# Patient Record
Sex: MALE | Race: BLACK OR AFRICAN AMERICAN | NOT HISPANIC OR LATINO | Employment: FULL TIME | URBAN - METROPOLITAN AREA
[De-identification: names, ages, dates, MRNs, and addresses within clinical notes are randomized per-mention and may not be internally consistent; named-entity substitution may affect disease eponyms.]

---

## 2020-10-21 ENCOUNTER — OFFICE VISIT (OUTPATIENT)
Dept: CARDIOLOGY | Facility: CLINIC | Age: 55
End: 2020-10-21
Payer: MEDICARE

## 2020-10-21 VITALS
DIASTOLIC BLOOD PRESSURE: 92 MMHG | HEART RATE: 91 BPM | SYSTOLIC BLOOD PRESSURE: 130 MMHG | WEIGHT: 315 LBS | OXYGEN SATURATION: 96 %

## 2020-10-21 DIAGNOSIS — I10 ESSENTIAL (PRIMARY) HYPERTENSION: ICD-10-CM

## 2020-10-21 DIAGNOSIS — G45.9 TIA (TRANSIENT ISCHEMIC ATTACK): Primary | ICD-10-CM

## 2020-10-21 DIAGNOSIS — I42.8 NONISCHEMIC CARDIOMYOPATHY: ICD-10-CM

## 2020-10-21 DIAGNOSIS — Z95.810 AUTOMATIC IMPLANTABLE CARDIOVERTER-DEFIBRILLATOR IN SITU: ICD-10-CM

## 2020-10-21 DIAGNOSIS — I50.42 CHRONIC COMBINED SYSTOLIC AND DIASTOLIC HEART FAILURE: ICD-10-CM

## 2020-10-21 PROBLEM — E66.01 MORBID OBESITY WITH BMI OF 50.0-59.9, ADULT: Status: ACTIVE | Noted: 2019-11-21

## 2020-10-21 PROBLEM — E78.2 MIXED HYPERLIPIDEMIA: Status: ACTIVE | Noted: 2020-03-29

## 2020-10-21 PROBLEM — I47.20 VT (VENTRICULAR TACHYCARDIA): Status: ACTIVE | Noted: 2018-06-20

## 2020-10-21 PROCEDURE — 93010 EKG 12-LEAD: ICD-10-PCS | Mod: ,,, | Performed by: INTERNAL MEDICINE

## 2020-10-21 PROCEDURE — 99999 PR PBB SHADOW E&M-NEW PATIENT-LVL III: ICD-10-PCS | Mod: PBBFAC,,, | Performed by: INTERNAL MEDICINE

## 2020-10-21 PROCEDURE — 99214 PR OFFICE/OUTPT VISIT, EST, LEVL IV, 30-39 MIN: ICD-10-PCS | Mod: S$PBB,,, | Performed by: INTERNAL MEDICINE

## 2020-10-21 PROCEDURE — 99214 OFFICE O/P EST MOD 30 MIN: CPT | Mod: S$PBB,,, | Performed by: INTERNAL MEDICINE

## 2020-10-21 PROCEDURE — 93005 ELECTROCARDIOGRAM TRACING: CPT

## 2020-10-21 PROCEDURE — 93010 ELECTROCARDIOGRAM REPORT: CPT | Mod: ,,, | Performed by: INTERNAL MEDICINE

## 2020-10-21 PROCEDURE — 99203 OFFICE O/P NEW LOW 30 MIN: CPT | Mod: PBBFAC | Performed by: INTERNAL MEDICINE

## 2020-10-21 PROCEDURE — 99999 PR PBB SHADOW E&M-NEW PATIENT-LVL III: CPT | Mod: PBBFAC,,, | Performed by: INTERNAL MEDICINE

## 2020-10-21 RX ORDER — PANTOPRAZOLE SODIUM 40 MG/1
40 TABLET, DELAYED RELEASE ORAL DAILY
COMMUNITY
Start: 2020-09-21 | End: 2020-12-10 | Stop reason: SDUPTHER

## 2020-10-21 RX ORDER — SPIRONOLACTONE 25 MG/1
25 TABLET ORAL DAILY
COMMUNITY
Start: 2020-08-31 | End: 2021-03-29 | Stop reason: SDUPTHER

## 2020-10-21 RX ORDER — DIGOXIN 250 MCG
250 TABLET ORAL DAILY
COMMUNITY
Start: 2020-01-23 | End: 2021-03-29 | Stop reason: SDUPTHER

## 2020-10-21 RX ORDER — CARVEDILOL 25 MG/1
25 TABLET ORAL 2 TIMES DAILY
COMMUNITY
Start: 2020-01-23 | End: 2022-01-19 | Stop reason: SDUPTHER

## 2020-10-21 RX ORDER — LOSARTAN POTASSIUM 50 MG/1
50 TABLET ORAL DAILY
COMMUNITY
Start: 2020-09-21 | End: 2020-11-17 | Stop reason: ALTCHOICE

## 2020-10-21 RX ORDER — ATORVASTATIN CALCIUM 40 MG/1
40 TABLET, FILM COATED ORAL DAILY
COMMUNITY
Start: 2020-05-27 | End: 2020-10-26 | Stop reason: SDUPTHER

## 2020-10-21 RX ORDER — ALLOPURINOL 100 MG/1
100 TABLET ORAL DAILY
COMMUNITY
Start: 2020-07-09 | End: 2021-07-09

## 2020-10-21 RX ORDER — LOSARTAN POTASSIUM 25 MG/1
50 TABLET ORAL DAILY
COMMUNITY
Start: 2020-08-31 | End: 2020-10-21

## 2020-10-21 RX ORDER — INDOMETHACIN 50 MG/1
50 CAPSULE ORAL
COMMUNITY
End: 2023-08-09

## 2020-10-21 NOTE — PROGRESS NOTES
Cardiology    10/21/2020  1:26 PM    Problem list  Patient Active Problem List   Diagnosis    Automatic implantable cardioverter-defibrillator in situ    Essential hypertension    Mixed hyperlipidemia    Morbid obesity with BMI of 50.0-59.9, adult    Nonischemic cardiomyopathy    VT (ventricular tachycardia)       CC:  NICMP    HPI:  Was last seen September by COREY Cowan/Dr. Adorno.  He wanted to f/u with us now.  As we were driving he episode where he describes losing vision for a few seconds.  Fortunately he was able to liver and his wife was able to drive.  He also reported dizziness which is not position.  He denies any angina.  He reports coughing which has improved since taking pantoprazole month in September.  He denies any chest pain or paroxysmal nocturnal dyspnea.  He was changed from candesartan to losartan 25 mg.  He was instructed to take 50 mg of losartan which she has not started yet.    Medications  Current Outpatient Medications   Medication Sig Dispense Refill    allopurinoL (ZYLOPRIM) 100 MG tablet Take 100 mg by mouth once daily.      aspirin (ECOTRIN) 81 MG EC tablet Take 81 mg by mouth. 1 Tablet, Delayed Release (E.C.) Oral Every day      atorvastatin (LIPITOR) 40 MG tablet Take 40 mg by mouth once daily.      carvediloL (COREG) 25 MG tablet Take 25 mg by mouth 2 (two) times a day.      digoxin (LANOXIN) 250 mcg tablet Take 250 mcg by mouth once daily.      furosemide (LASIX) 40 MG tablet Take 40 mg by mouth. 1 Tablet Oral Every day      indomethacin (INDOCIN) 50 MG capsule Take 50 mg by mouth as needed.      losartan (COZAAR) 50 MG tablet Take 50 mg by mouth once daily.      pantoprazole (PROTONIX) 40 MG tablet Take 40 mg by mouth once daily.      spironolactone (ALDACTONE) 25 MG tablet Take 25 mg by mouth once daily.       No current facility-administered medications for this visit.       Prior to Admission medications    Medication Sig Start Date End Date Taking?  Authorizing Provider   allopurinoL (ZYLOPRIM) 100 MG tablet Take 100 mg by mouth once daily. 7/9/20 7/9/21 Yes Historical Provider   aspirin (ECOTRIN) 81 MG EC tablet Take 81 mg by mouth. 1 Tablet, Delayed Release (E.C.) Oral Every day   Yes Historical Provider   atorvastatin (LIPITOR) 40 MG tablet Take 40 mg by mouth once daily. 5/27/20 5/27/21 Yes Historical Provider   carvediloL (COREG) 25 MG tablet Take 25 mg by mouth 2 (two) times a day. 1/23/20  Yes Historical Provider   digoxin (LANOXIN) 250 mcg tablet Take 250 mcg by mouth once daily. 1/23/20  Yes Historical Provider   furosemide (LASIX) 40 MG tablet Take 40 mg by mouth. 1 Tablet Oral Every day   Yes Historical Provider   indomethacin (INDOCIN) 50 MG capsule Take 50 mg by mouth as needed.   Yes Historical Provider   losartan (COZAAR) 50 MG tablet Take 50 mg by mouth once daily. 9/21/20 12/20/20 Yes Historical Provider   pantoprazole (PROTONIX) 40 MG tablet Take 40 mg by mouth once daily. 9/21/20  Yes Historical Provider   spironolactone (ALDACTONE) 25 MG tablet Take 25 mg by mouth once daily. 8/31/20  Yes Historical Provider   atorvastatin (LIPITOR) 10 MG tablet Take 10 mg by mouth. 1 Tablet Oral Every day  10/21/20  Historical Provider   candesartan (ATACAND) 16 MG tablet Take 16 mg by mouth. 1 Tablet Oral Every day  10/21/20  Historical Provider   carvedilol (COREG CR) 80 MG 24 hr capsule Take 80 mg by mouth. 1 Cap, Multiphasic Release 24 hr Oral Every day  10/21/20  Historical Provider   losartan (COZAAR) 25 MG tablet Take 50 mg by mouth once daily. 8/31/20 10/21/20  Historical Provider         History  No past medical history on file.  No past surgical history on file.  Social History     Socioeconomic History    Marital status:      Spouse name: Not on file    Number of children: Not on file    Years of education: Not on file    Highest education level: Not on file   Occupational History    Not on file   Social Needs    Financial resource  strain: Not on file    Food insecurity     Worry: Not on file     Inability: Not on file    Transportation needs     Medical: Not on file     Non-medical: Not on file   Tobacco Use    Smoking status: Former Smoker     Types: Cigars    Smokeless tobacco: Never Used   Substance and Sexual Activity    Alcohol use: Yes    Drug use: Not on file    Sexual activity: Not on file   Lifestyle    Physical activity     Days per week: Not on file     Minutes per session: Not on file    Stress: Not on file   Relationships    Social connections     Talks on phone: Not on file     Gets together: Not on file     Attends Adventism service: Not on file     Active member of club or organization: Not on file     Attends meetings of clubs or organizations: Not on file     Relationship status: Not on file   Other Topics Concern    Not on file   Social History Narrative    Not on file         Allergies  Review of patient's allergies indicates:  No Known Allergies      Review of Systems   Review of Systems   Constitution: Negative for decreased appetite, fever and weight loss.   HENT: Negative for congestion and nosebleeds.    Eyes: Positive for blurred vision. Negative for double vision, vision loss in left eye, vision loss in right eye and visual disturbance.   Cardiovascular: Negative for chest pain, claudication, cyanosis, dyspnea on exertion, irregular heartbeat, leg swelling, near-syncope, orthopnea, palpitations, paroxysmal nocturnal dyspnea and syncope.   Respiratory: Positive for cough. Negative for hemoptysis, shortness of breath, sleep disturbances due to breathing, snoring, sputum production and wheezing.    Endocrine: Negative for cold intolerance and heat intolerance.   Skin: Negative for nail changes and rash.   Musculoskeletal: Negative for joint pain, muscle cramps, muscle weakness and myalgias.   Gastrointestinal: Negative for change in bowel habit, heartburn, hematemesis, hematochezia, hemorrhoids and melena.    Neurological: Negative for dizziness, focal weakness and headaches.         Physical Exam  Wt Readings from Last 1 Encounters:   10/21/20 (!) 148.5 kg (327 lb 7.9 oz)     BP Readings from Last 3 Encounters:   10/21/20 (!) 130/92   07/30/12 116/61     Pulse Readings from Last 1 Encounters:   10/21/20 91     There is no height or weight on file to calculate BMI.    Physical Exam   Constitutional: He is oriented to person, place, and time. He appears well-developed and well-nourished.   Cardiovascular: Normal rate, regular rhythm, S1 normal, S2 normal and normal heart sounds.   Pulses:       Carotid pulses are 2+ on the right side and 2+ on the left side.       Radial pulses are 2+ on the right side and 2+ on the left side.        Dorsalis pedis pulses are 2+ on the right side and 2+ on the left side.   L side ICD.   Pulmonary/Chest: Breath sounds normal.   Abdominal: Bowel sounds are normal.   Neurological: He is alert and oriented to person, place, and time.   Skin: Skin is warm and dry.   Vitals reviewed.                Assessment  1. Nonischemic cardiomyopathy  Stable    2. Automatic implantable cardioverter-defibrillator in situ  Stable        Plan and Discussion  Increase losartan 50 mg daily and monitor blood pressure.  Will check his TSH since he is on amiodarone.  Will check his carotid Doppler given his vision loss.  He was encouraged to get an eye exam.  Will schedule ICD device check next week to evaluate for any arrhythmias.    Follow Up  One month    Time spent evaluating and treating patient 20 minutes with >50% of this time being face-to-face.     Senthil Rodríguez MD, F.A.C.C, F.S.C.A.I.

## 2020-10-26 ENCOUNTER — OFFICE VISIT (OUTPATIENT)
Dept: CARDIOLOGY | Facility: CLINIC | Age: 55
End: 2020-10-26
Payer: MEDICARE

## 2020-10-26 ENCOUNTER — HOSPITAL ENCOUNTER (OUTPATIENT)
Dept: CARDIOLOGY | Facility: OTHER | Age: 55
Discharge: HOME | End: 2020-10-26
Attending: INTERNAL MEDICINE
Payer: MEDICARE

## 2020-10-26 VITALS — SYSTOLIC BLOOD PRESSURE: 124 MMHG | DIASTOLIC BLOOD PRESSURE: 86 MMHG | HEART RATE: 88 BPM | OXYGEN SATURATION: 99 %

## 2020-10-26 DIAGNOSIS — Z95.810 AUTOMATIC IMPLANTABLE CARDIOVERTER-DEFIBRILLATOR IN SITU: Primary | ICD-10-CM

## 2020-10-26 DIAGNOSIS — I47.20 VT (VENTRICULAR TACHYCARDIA): Primary | ICD-10-CM

## 2020-10-26 DIAGNOSIS — E66.01 MORBID OBESITY WITH BMI OF 50.0-59.9, ADULT: ICD-10-CM

## 2020-10-26 DIAGNOSIS — I42.8 NONISCHEMIC CARDIOMYOPATHY: ICD-10-CM

## 2020-10-26 DIAGNOSIS — E78.2 MIXED HYPERLIPIDEMIA: ICD-10-CM

## 2020-10-26 DIAGNOSIS — Z95.810 AUTOMATIC IMPLANTABLE CARDIOVERTER-DEFIBRILLATOR IN SITU: ICD-10-CM

## 2020-10-26 DIAGNOSIS — I10 ESSENTIAL HYPERTENSION: ICD-10-CM

## 2020-10-26 DIAGNOSIS — G45.9 TIA (TRANSIENT ISCHEMIC ATTACK): ICD-10-CM

## 2020-10-26 LAB
LEFT CCA DIST DIAS: 22 CM/S
LEFT CCA DIST SYS: 79 CM/S
LEFT CCA PROX DIAS: 28 CM/S
LEFT CCA PROX SYS: 94 CM/S
LEFT ECA SYS: 60 CM/S
LEFT ICA DIST DIAS: 28 CM/S
LEFT ICA DIST SYS: 96 CM/S
LEFT ICA MID DIAS: 27 CM/S
LEFT ICA MID SYS: 56 CM/S
LEFT ICA PROX DIAS: 11 CM/S
LEFT ICA PROX SYS: 47 CM/S
LEFT VERTEBRAL DIAS: 14 CM/S
LEFT VERTEBRAL SYS: 45 CM/S
OHS CV CAROTID RIGHT ICA EDV HIGHEST: 26
OHS CV CAROTID ULTRASOUND LEFT ICA/CCA RATIO: 1.22
OHS CV CAROTID ULTRASOUND RIGHT ICA/CCA RATIO: 1
OHS CV PV CAROTID LEFT HIGHEST CCA: 94
OHS CV PV CAROTID LEFT HIGHEST ICA: 96
OHS CV PV CAROTID RIGHT HIGHEST CCA: 75
OHS CV PV CAROTID RIGHT HIGHEST ICA: 68
OHS CV US CAROTID LEFT HIGHEST EDV: 28
RIGHT CCA DIST DIAS: 20 CM/S
RIGHT CCA DIST SYS: 68 CM/S
RIGHT CCA PROX DIAS: 19 CM/S
RIGHT CCA PROX SYS: 75 CM/S
RIGHT ECA SYS: 64 CM/S
RIGHT ICA DIST DIAS: 26 CM/S
RIGHT ICA DIST SYS: 63 CM/S
RIGHT ICA MID DIAS: 20 CM/S
RIGHT ICA MID SYS: 48 CM/S
RIGHT ICA PROX DIAS: 22 CM/S
RIGHT ICA PROX SYS: 68 CM/S
RIGHT VERTEBRAL DIAS: 17 CM/S
RIGHT VERTEBRAL SYS: 42 CM/S

## 2020-10-26 PROCEDURE — 99211 OFF/OP EST MAY X REQ PHY/QHP: CPT | Mod: PBBFAC | Performed by: INTERNAL MEDICINE

## 2020-10-26 PROCEDURE — 99214 OFFICE O/P EST MOD 30 MIN: CPT | Mod: S$PBB,,, | Performed by: INTERNAL MEDICINE

## 2020-10-26 PROCEDURE — 93880 CV US DOPPLER CAROTID (CUPID ONLY): ICD-10-PCS | Mod: 26,,, | Performed by: INTERNAL MEDICINE

## 2020-10-26 PROCEDURE — 93880 EXTRACRANIAL BILAT STUDY: CPT | Mod: 26,,, | Performed by: INTERNAL MEDICINE

## 2020-10-26 PROCEDURE — 99024 POSTOP FOLLOW-UP VISIT: CPT | Mod: POP,,, | Performed by: INTERNAL MEDICINE

## 2020-10-26 PROCEDURE — 93880 EXTRACRANIAL BILAT STUDY: CPT

## 2020-10-26 PROCEDURE — 93283 PR PROGRAM EVAL (IN PERSON) IMPLANT DEVICE,CARDVERT/DEFIB,2 LEAD: ICD-10-PCS | Mod: 26,S$PBB,, | Performed by: INTERNAL MEDICINE

## 2020-10-26 PROCEDURE — 93283 PRGRMG EVAL IMPLANTABLE DFB: CPT | Mod: PBBFAC | Performed by: INTERNAL MEDICINE

## 2020-10-26 PROCEDURE — 99999 PR PBB SHADOW E&M-EST. PATIENT-LVL II: ICD-10-PCS | Mod: PBBFAC,,, | Performed by: INTERNAL MEDICINE

## 2020-10-26 PROCEDURE — 99024 PR POST-OP FOLLOW-UP VISIT: ICD-10-PCS | Mod: POP,,, | Performed by: INTERNAL MEDICINE

## 2020-10-26 PROCEDURE — 99999 PR PBB SHADOW E&M-EST. PATIENT-LVL I: CPT | Mod: PBBFAC,,, | Performed by: INTERNAL MEDICINE

## 2020-10-26 PROCEDURE — 93283 PRGRMG EVAL IMPLANTABLE DFB: CPT | Mod: 26,S$PBB,, | Performed by: INTERNAL MEDICINE

## 2020-10-26 PROCEDURE — 99214 PR OFFICE/OUTPT VISIT, EST, LEVL IV, 30-39 MIN: ICD-10-PCS | Mod: S$PBB,,, | Performed by: INTERNAL MEDICINE

## 2020-10-26 PROCEDURE — 99999 PR PBB SHADOW E&M-EST. PATIENT-LVL I: ICD-10-PCS | Mod: PBBFAC,,, | Performed by: INTERNAL MEDICINE

## 2020-10-26 PROCEDURE — 99212 OFFICE O/P EST SF 10 MIN: CPT | Mod: PBBFAC,25,27 | Performed by: INTERNAL MEDICINE

## 2020-10-26 PROCEDURE — 99999 PR PBB SHADOW E&M-EST. PATIENT-LVL II: CPT | Mod: PBBFAC,,, | Performed by: INTERNAL MEDICINE

## 2020-10-26 RX ORDER — ATORVASTATIN CALCIUM 40 MG/1
40 TABLET, FILM COATED ORAL DAILY
Qty: 90 TABLET | Refills: 3 | Status: SHIPPED | OUTPATIENT
Start: 2020-10-26 | End: 2022-01-19 | Stop reason: SDUPTHER

## 2020-10-26 RX ORDER — AMIODARONE HYDROCHLORIDE 200 MG/1
200 TABLET ORAL 2 TIMES DAILY
Qty: 90 TABLET | Refills: 3 | Status: SHIPPED | OUTPATIENT
Start: 2020-10-26 | End: 2021-06-10

## 2020-10-26 NOTE — PROGRESS NOTES
Cardiology    10/26/2020  3:49 PM    Problem list  Patient Active Problem List   Diagnosis    Automatic implantable cardioverter-defibrillator in situ    Essential hypertension    Mixed hyperlipidemia    Morbid obesity with BMI of 50.0-59.9, adult    Nonischemic cardiomyopathy    VT (ventricular tachycardia)       CC:  SVT, NSVT    HPI:  Asked to be seen today since his device interrogation revealed multiple episodes of SVT and nonsustained VT.  One of these episodes coincided with his symptoms decreased vision while driving the [place].  Remembers that he was given amiodarone the same issues but he was afraid to take it.    Medications  Current Outpatient Medications   Medication Sig Dispense Refill    allopurinoL (ZYLOPRIM) 100 MG tablet Take 100 mg by mouth once daily.      aspirin (ECOTRIN) 81 MG EC tablet Take 81 mg by mouth. 1 Tablet, Delayed Release (E.C.) Oral Every day      atorvastatin (LIPITOR) 40 MG tablet Take 40 mg by mouth once daily.      carvediloL (COREG) 25 MG tablet Take 25 mg by mouth 2 (two) times a day.      digoxin (LANOXIN) 250 mcg tablet Take 250 mcg by mouth once daily.      furosemide (LASIX) 40 MG tablet Take 40 mg by mouth. 1 Tablet Oral Every day      indomethacin (INDOCIN) 50 MG capsule Take 50 mg by mouth as needed.      losartan (COZAAR) 50 MG tablet Take 50 mg by mouth once daily.       pantoprazole (PROTONIX) 40 MG tablet Take 40 mg by mouth once daily.      spironolactone (ALDACTONE) 25 MG tablet Take 25 mg by mouth once daily.       No current facility-administered medications for this visit.       Prior to Admission medications    Medication Sig Start Date End Date Taking? Authorizing Provider   allopurinoL (ZYLOPRIM) 100 MG tablet Take 100 mg by mouth once daily. 7/9/20 7/9/21 Yes Historical Provider   aspirin (ECOTRIN) 81 MG EC tablet Take 81 mg by mouth. 1 Tablet, Delayed Release (E.C.) Oral Every day   Yes Historical Provider   atorvastatin (LIPITOR) 40  MG tablet Take 40 mg by mouth once daily. 5/27/20 5/27/21 Yes Historical Provider   carvediloL (COREG) 25 MG tablet Take 25 mg by mouth 2 (two) times a day. 1/23/20  Yes Historical Provider   digoxin (LANOXIN) 250 mcg tablet Take 250 mcg by mouth once daily. 1/23/20  Yes Historical Provider   furosemide (LASIX) 40 MG tablet Take 40 mg by mouth. 1 Tablet Oral Every day   Yes Historical Provider   indomethacin (INDOCIN) 50 MG capsule Take 50 mg by mouth as needed.   Yes Historical Provider   losartan (COZAAR) 50 MG tablet Take 50 mg by mouth once daily.  9/21/20 12/20/20 Yes Historical Provider   pantoprazole (PROTONIX) 40 MG tablet Take 40 mg by mouth once daily. 9/21/20  Yes Historical Provider   spironolactone (ALDACTONE) 25 MG tablet Take 25 mg by mouth once daily. 8/31/20  Yes Historical Provider         History  No past medical history on file.  No past surgical history on file.  Social History     Socioeconomic History    Marital status:      Spouse name: Not on file    Number of children: Not on file    Years of education: Not on file    Highest education level: Not on file   Occupational History    Not on file   Social Needs    Financial resource strain: Not on file    Food insecurity     Worry: Not on file     Inability: Not on file    Transportation needs     Medical: Not on file     Non-medical: Not on file   Tobacco Use    Smoking status: Former Smoker     Types: Cigars    Smokeless tobacco: Never Used   Substance and Sexual Activity    Alcohol use: Yes    Drug use: Not on file    Sexual activity: Not on file   Lifestyle    Physical activity     Days per week: Not on file     Minutes per session: Not on file    Stress: Not on file   Relationships    Social connections     Talks on phone: Not on file     Gets together: Not on file     Attends Tenriism service: Not on file     Active member of club or organization: Not on file     Attends meetings of clubs or organizations: Not on  file     Relationship status: Not on file   Other Topics Concern    Not on file   Social History Narrative    Not on file         Allergies  Review of patient's allergies indicates:  No Known Allergies      Review of Systems   Review of Systems   Constitution: Negative for decreased appetite, fever and weight loss.   HENT: Negative for congestion and nosebleeds.    Eyes: Positive for blurred vision. Negative for double vision, vision loss in left eye, vision loss in right eye and visual disturbance.   Cardiovascular: Negative for chest pain, claudication, cyanosis, dyspnea on exertion, irregular heartbeat, leg swelling, near-syncope, orthopnea, palpitations, paroxysmal nocturnal dyspnea and syncope.   Respiratory: Positive for cough. Negative for hemoptysis, shortness of breath, sleep disturbances due to breathing, snoring, sputum production and wheezing.    Endocrine: Negative for cold intolerance and heat intolerance.   Skin: Negative for nail changes and rash.   Musculoskeletal: Negative for joint pain, muscle cramps, muscle weakness and myalgias.   Gastrointestinal: Negative for change in bowel habit, heartburn, hematemesis, hematochezia, hemorrhoids and melena.   Neurological: Negative for dizziness, focal weakness and headaches.         Physical Exam  Wt Readings from Last 1 Encounters:   10/21/20 (!) 148.5 kg (327 lb 7.9 oz)     BP Readings from Last 3 Encounters:   10/26/20 124/86   10/21/20 (!) 130/92   07/30/12 116/61     Pulse Readings from Last 1 Encounters:   10/26/20 88     There is no height or weight on file to calculate BMI.    Physical Exam   Constitutional: He is oriented to person, place, and time. He appears well-developed and well-nourished.   Cardiovascular: Normal rate, regular rhythm, S1 normal, S2 normal and normal heart sounds.   Pulses:       Carotid pulses are 2+ on the right side and 2+ on the left side.       Radial pulses are 2+ on the right side and 2+ on the left side.         Dorsalis pedis pulses are 2+ on the right side and 2+ on the left side.   L side ICD.   Pulmonary/Chest: Breath sounds normal.   Abdominal: Bowel sounds are normal.   Neurological: He is alert and oriented to person, place, and time.   Skin: Skin is warm and dry.   Vitals reviewed.                Assessment  1. VT (ventricular tachycardia)  Being     2. Nonischemic cardiomyopathy  stable    3. Mixed hyperlipidemia  stable    4. Essential hypertension  stable    5. Automatic implantable cardioverter-defibrillator in situ  stable    6. Morbid obesity with BMI of 50.0-59.9, adult  unchanged        Plan and Discussion  Discussed his device interrogation which showed SVT and nonsustained VT.  Encouraged to comply with amiodarone loading of 200 mg twice daily for 3 weeks and then decrease to 200 mg daily for suppression.  He already had baseline labs and chest x-ray which were all fine in October.  He was recommended to get his annual eye exam.  Once fully loaded on amiodarone, will recheck his device.    Follow Up  11/17/20     Time spent evaluating and treating patient 20 minutes with >50% of this time being face-to-face.     Senthil Rodríguez MD, F.A.C.C, F.S.C.A.I.

## 2020-11-17 ENCOUNTER — OFFICE VISIT (OUTPATIENT)
Dept: CARDIOLOGY | Facility: CLINIC | Age: 55
End: 2020-11-17
Payer: MEDICARE

## 2020-11-17 VITALS
HEART RATE: 90 BPM | HEIGHT: 70 IN | BODY MASS INDEX: 45.1 KG/M2 | WEIGHT: 315 LBS | DIASTOLIC BLOOD PRESSURE: 78 MMHG | SYSTOLIC BLOOD PRESSURE: 136 MMHG

## 2020-11-17 DIAGNOSIS — I10 ESSENTIAL HYPERTENSION: ICD-10-CM

## 2020-11-17 DIAGNOSIS — I47.20 VT (VENTRICULAR TACHYCARDIA): Primary | ICD-10-CM

## 2020-11-17 DIAGNOSIS — Z95.810 AUTOMATIC IMPLANTABLE CARDIOVERTER-DEFIBRILLATOR IN SITU: ICD-10-CM

## 2020-11-17 DIAGNOSIS — E78.2 MIXED HYPERLIPIDEMIA: ICD-10-CM

## 2020-11-17 DIAGNOSIS — E66.01 MORBID OBESITY WITH BMI OF 50.0-59.9, ADULT: ICD-10-CM

## 2020-11-17 DIAGNOSIS — I42.8 NONISCHEMIC CARDIOMYOPATHY: ICD-10-CM

## 2020-11-17 PROCEDURE — 99214 PR OFFICE/OUTPT VISIT, EST, LEVL IV, 30-39 MIN: ICD-10-PCS | Mod: S$PBB,,, | Performed by: INTERNAL MEDICINE

## 2020-11-17 PROCEDURE — 93010 EKG 12-LEAD: ICD-10-PCS | Mod: S$PBB,,, | Performed by: INTERNAL MEDICINE

## 2020-11-17 PROCEDURE — 99213 OFFICE O/P EST LOW 20 MIN: CPT | Mod: PBBFAC,PN,25 | Performed by: INTERNAL MEDICINE

## 2020-11-17 PROCEDURE — 93010 ELECTROCARDIOGRAM REPORT: CPT | Mod: S$PBB,,, | Performed by: INTERNAL MEDICINE

## 2020-11-17 PROCEDURE — 99214 OFFICE O/P EST MOD 30 MIN: CPT | Mod: S$PBB,,, | Performed by: INTERNAL MEDICINE

## 2020-11-17 PROCEDURE — 93005 ELECTROCARDIOGRAM TRACING: CPT | Mod: PBBFAC,PN | Performed by: INTERNAL MEDICINE

## 2020-11-17 PROCEDURE — 99999 PR PBB SHADOW E&M-EST. PATIENT-LVL III: ICD-10-PCS | Mod: PBBFAC,,, | Performed by: INTERNAL MEDICINE

## 2020-11-17 PROCEDURE — 99999 PR PBB SHADOW E&M-EST. PATIENT-LVL III: CPT | Mod: PBBFAC,,, | Performed by: INTERNAL MEDICINE

## 2020-11-17 RX ORDER — SACUBITRIL AND VALSARTAN 24; 26 MG/1; MG/1
1 TABLET, FILM COATED ORAL 2 TIMES DAILY
Qty: 180 TABLET | Refills: 3 | Status: SHIPPED | OUTPATIENT
Start: 2020-11-17 | End: 2021-03-09

## 2020-11-17 NOTE — PROGRESS NOTES
Cardiology    11/17/2020  11:21 AM    Problem list  Patient Active Problem List   Diagnosis    Automatic implantable cardioverter-defibrillator in situ    Essential hypertension    Mixed hyperlipidemia    Morbid obesity with BMI of 50.0-59.9, adult    Nonischemic cardiomyopathy    VT (ventricular tachycardia)       CC:  No complaints    HPI:  He is doing well.  He is tolerating amiodarone.  His dizziness has improved.  He denies any visual changes.  We had discussed changing to Entresto last year but he could not afford it.    Medications  Current Outpatient Medications   Medication Sig Dispense Refill    allopurinoL (ZYLOPRIM) 100 MG tablet Take 100 mg by mouth once daily.      amiodarone (PACERONE) 200 MG Tab Take 1 tablet (200 mg total) by mouth 2 (two) times daily. Take 1 tablet twice daily for 3 weeks and then decrease to 1 tablet daily. 90 tablet 3    aspirin (ECOTRIN) 81 MG EC tablet Take 81 mg by mouth. 1 Tablet, Delayed Release (E.C.) Oral Every day      atorvastatin (LIPITOR) 40 MG tablet Take 1 tablet (40 mg total) by mouth once daily. 90 tablet 3    carvediloL (COREG) 25 MG tablet Take 25 mg by mouth 2 (two) times a day.      digoxin (LANOXIN) 250 mcg tablet Take 250 mcg by mouth once daily.      furosemide (LASIX) 40 MG tablet Take 40 mg by mouth. 1 Tablet Oral Every day      indomethacin (INDOCIN) 50 MG capsule Take 50 mg by mouth as needed.      losartan (COZAAR) 50 MG tablet Take 50 mg by mouth once daily.       pantoprazole (PROTONIX) 40 MG tablet Take 40 mg by mouth once daily.      spironolactone (ALDACTONE) 25 MG tablet Take 25 mg by mouth once daily.       No current facility-administered medications for this visit.       Prior to Admission medications    Medication Sig Start Date End Date Taking? Authorizing Provider   allopurinoL (ZYLOPRIM) 100 MG tablet Take 100 mg by mouth once daily. 7/9/20 7/9/21  Historical Provider   amiodarone (PACERONE) 200 MG Tab Take 1 tablet  (200 mg total) by mouth 2 (two) times daily. Take 1 tablet twice daily for 3 weeks and then decrease to 1 tablet daily. 10/26/20 4/24/21  Senthil Rodríguez MD   aspirin (ECOTRIN) 81 MG EC tablet Take 81 mg by mouth. 1 Tablet, Delayed Release (E.C.) Oral Every day    Historical Provider   atorvastatin (LIPITOR) 40 MG tablet Take 1 tablet (40 mg total) by mouth once daily. 10/26/20 10/26/21  Senthil Rodríguez MD   carvediloL (COREG) 25 MG tablet Take 25 mg by mouth 2 (two) times a day. 1/23/20   Historical Provider   digoxin (LANOXIN) 250 mcg tablet Take 250 mcg by mouth once daily. 1/23/20   Historical Provider   furosemide (LASIX) 40 MG tablet Take 40 mg by mouth. 1 Tablet Oral Every day    Historical Provider   indomethacin (INDOCIN) 50 MG capsule Take 50 mg by mouth as needed.    Historical Provider   losartan (COZAAR) 50 MG tablet Take 50 mg by mouth once daily.  9/21/20 12/20/20  Historical Provider   pantoprazole (PROTONIX) 40 MG tablet Take 40 mg by mouth once daily. 9/21/20   Historical Provider   spironolactone (ALDACTONE) 25 MG tablet Take 25 mg by mouth once daily. 8/31/20   Historical Provider         History  No past medical history on file.  No past surgical history on file.  Social History     Socioeconomic History    Marital status:      Spouse name: Not on file    Number of children: Not on file    Years of education: Not on file    Highest education level: Not on file   Occupational History    Not on file   Social Needs    Financial resource strain: Not on file    Food insecurity     Worry: Not on file     Inability: Not on file    Transportation needs     Medical: Not on file     Non-medical: Not on file   Tobacco Use    Smoking status: Former Smoker     Types: Cigars    Smokeless tobacco: Never Used   Substance and Sexual Activity    Alcohol use: Yes    Drug use: Not on file    Sexual activity: Not on file   Lifestyle    Physical activity     Days per week: Not on file      Minutes per session: Not on file    Stress: Not on file   Relationships    Social connections     Talks on phone: Not on file     Gets together: Not on file     Attends Orthodoxy service: Not on file     Active member of club or organization: Not on file     Attends meetings of clubs or organizations: Not on file     Relationship status: Not on file   Other Topics Concern    Not on file   Social History Narrative    Not on file         Allergies  Review of patient's allergies indicates:  No Known Allergies      Review of Systems   Review of Systems   Constitution: Negative for decreased appetite, fever and weight loss.   HENT: Negative for congestion and nosebleeds.    Eyes: Negative for blurred vision, double vision, vision loss in left eye, vision loss in right eye and visual disturbance.   Cardiovascular: Negative for chest pain, claudication, cyanosis, dyspnea on exertion, irregular heartbeat, leg swelling, near-syncope, orthopnea, palpitations, paroxysmal nocturnal dyspnea and syncope.   Respiratory: Positive for cough. Negative for hemoptysis, shortness of breath, sleep disturbances due to breathing, snoring, sputum production and wheezing.    Endocrine: Negative for cold intolerance and heat intolerance.   Skin: Negative for nail changes and rash.   Musculoskeletal: Negative for joint pain, muscle cramps, muscle weakness and myalgias.   Gastrointestinal: Negative for change in bowel habit, heartburn, hematemesis, hematochezia, hemorrhoids and melena.   Neurological: Negative for dizziness, focal weakness and headaches.         Physical Exam  Wt Readings from Last 1 Encounters:   10/21/20 (!) 148.5 kg (327 lb 7.9 oz)     BP Readings from Last 3 Encounters:   10/26/20 124/86   10/21/20 (!) 130/92   07/30/12 116/61     Pulse Readings from Last 1 Encounters:   10/26/20 88     There is no height or weight on file to calculate BMI.    Physical Exam   Constitutional: He is oriented to person, place, and time. He  appears well-developed and well-nourished.   Cardiovascular: Normal rate, regular rhythm, S1 normal, S2 normal and normal heart sounds.   Pulses:       Carotid pulses are 2+ on the right side and 2+ on the left side.       Radial pulses are 2+ on the right side and 2+ on the left side.        Dorsalis pedis pulses are 2+ on the right side and 2+ on the left side.   L side ICD.   Pulmonary/Chest: Breath sounds normal.   Abdominal: Bowel sounds are normal.   Neurological: He is alert and oriented to person, place, and time.   Skin: Skin is warm and dry.   Vitals reviewed.                Assessment  1. VT (ventricular tachycardia)  Stable    2. Nonischemic cardiomyopathy  Stable class 1    3. Mixed hyperlipidemia  Stable    4. Essential hypertension  Well controlled    5. Automatic implantable cardioverter-defibrillator in situ  Stable    6. Morbid obesity with BMI of 50.0-59.9, adult  Unchanged        Plan and Discussion  Discussed that we can try to switch losartan to Entresto again.  He will check with pharmacy as far as pricing.  He understands to stop the the losartan 36 hr before starting Entresto.  Will schedule device follow-up in 1 month    Follow Up  1-2 months    Time spent evaluating and treating patient 20 minutes with >50% of this time being face-to-face.     Senthil Rodríguez MD, F.A.C.C, F.S.C.A.I.

## 2020-12-10 ENCOUNTER — OFFICE VISIT (OUTPATIENT)
Dept: CARDIOLOGY | Facility: CLINIC | Age: 55
End: 2020-12-10
Payer: MEDICARE

## 2020-12-10 VITALS
HEART RATE: 67 BPM | OXYGEN SATURATION: 98 % | SYSTOLIC BLOOD PRESSURE: 116 MMHG | HEIGHT: 70 IN | WEIGHT: 315 LBS | BODY MASS INDEX: 45.1 KG/M2 | DIASTOLIC BLOOD PRESSURE: 74 MMHG

## 2020-12-10 DIAGNOSIS — E66.01 MORBID OBESITY WITH BMI OF 50.0-59.9, ADULT: ICD-10-CM

## 2020-12-10 DIAGNOSIS — E78.2 MIXED HYPERLIPIDEMIA: ICD-10-CM

## 2020-12-10 DIAGNOSIS — Z95.810 AUTOMATIC IMPLANTABLE CARDIOVERTER-DEFIBRILLATOR IN SITU: Primary | ICD-10-CM

## 2020-12-10 DIAGNOSIS — I42.8 NONISCHEMIC CARDIOMYOPATHY: ICD-10-CM

## 2020-12-10 DIAGNOSIS — Z95.810 AUTOMATIC IMPLANTABLE CARDIOVERTER-DEFIBRILLATOR IN SITU: ICD-10-CM

## 2020-12-10 DIAGNOSIS — I47.20 VT (VENTRICULAR TACHYCARDIA): Primary | ICD-10-CM

## 2020-12-10 DIAGNOSIS — I10 ESSENTIAL HYPERTENSION: ICD-10-CM

## 2020-12-10 DIAGNOSIS — K21.9 GASTROESOPHAGEAL REFLUX DISEASE, UNSPECIFIED WHETHER ESOPHAGITIS PRESENT: ICD-10-CM

## 2020-12-10 PROCEDURE — 99214 OFFICE O/P EST MOD 30 MIN: CPT | Mod: S$PBB,,, | Performed by: INTERNAL MEDICINE

## 2020-12-10 PROCEDURE — 99024 POSTOP FOLLOW-UP VISIT: CPT | Mod: POP,,, | Performed by: INTERNAL MEDICINE

## 2020-12-10 PROCEDURE — 99214 PR OFFICE/OUTPT VISIT, EST, LEVL IV, 30-39 MIN: ICD-10-PCS | Mod: S$PBB,,, | Performed by: INTERNAL MEDICINE

## 2020-12-10 PROCEDURE — 99213 OFFICE O/P EST LOW 20 MIN: CPT | Mod: PBBFAC,27 | Performed by: INTERNAL MEDICINE

## 2020-12-10 PROCEDURE — 99024 PR POST-OP FOLLOW-UP VISIT: ICD-10-PCS | Mod: POP,,, | Performed by: INTERNAL MEDICINE

## 2020-12-10 PROCEDURE — 99211 OFF/OP EST MAY X REQ PHY/QHP: CPT | Mod: PBBFAC | Performed by: INTERNAL MEDICINE

## 2020-12-10 PROCEDURE — 99999 PR PBB SHADOW E&M-EST. PATIENT-LVL I: CPT | Mod: PBBFAC,,, | Performed by: INTERNAL MEDICINE

## 2020-12-10 PROCEDURE — 93283 PRGRMG EVAL IMPLANTABLE DFB: CPT | Mod: 26,S$PBB,, | Performed by: INTERNAL MEDICINE

## 2020-12-10 PROCEDURE — 93283 PR PROGRAM EVAL (IN PERSON) IMPLANT DEVICE,CARDVERT/DEFIB,2 LEAD: ICD-10-PCS | Mod: 26,S$PBB,, | Performed by: INTERNAL MEDICINE

## 2020-12-10 PROCEDURE — 99999 PR PBB SHADOW E&M-EST. PATIENT-LVL III: ICD-10-PCS | Mod: PBBFAC,,, | Performed by: INTERNAL MEDICINE

## 2020-12-10 PROCEDURE — 99999 PR PBB SHADOW E&M-EST. PATIENT-LVL I: ICD-10-PCS | Mod: PBBFAC,,, | Performed by: INTERNAL MEDICINE

## 2020-12-10 PROCEDURE — 99999 PR PBB SHADOW E&M-EST. PATIENT-LVL III: CPT | Mod: PBBFAC,,, | Performed by: INTERNAL MEDICINE

## 2020-12-10 PROCEDURE — 93283 PRGRMG EVAL IMPLANTABLE DFB: CPT | Mod: PBBFAC | Performed by: INTERNAL MEDICINE

## 2020-12-10 RX ORDER — PANTOPRAZOLE SODIUM 40 MG/1
40 TABLET, DELAYED RELEASE ORAL DAILY
Qty: 30 TABLET | Refills: 3 | Status: SHIPPED | OUTPATIENT
Start: 2020-12-10 | End: 2022-04-04 | Stop reason: SDUPTHER

## 2020-12-10 RX ORDER — LOSARTAN POTASSIUM 50 MG/1
50 TABLET ORAL DAILY
COMMUNITY
End: 2021-09-27

## 2020-12-10 NOTE — PROGRESS NOTES
Cardiology    12/10/2020  3:18 PM    Problem list  Patient Active Problem List   Diagnosis    Automatic implantable cardioverter-defibrillator in situ    Essential hypertension    Mixed hyperlipidemia    Morbid obesity with BMI of 50.0-59.9, adult    Nonischemic cardiomyopathy    VT (ventricular tachycardia)       CC:  No complaints.     HPI:  Feels fine.  No CP, SOB, PND.  No dizziness.  Device interrogated and had SVT and 6 beat NSVT which is improved on amiodarone.  Has not gotten Entresto bc pharmacy has not called him.  Also, pantoprazole has helped with reflux and resolved his coughing.  Asking for refill since he forgot to ask Dr. Hidalgo.    Medications  Current Outpatient Medications   Medication Sig Dispense Refill    allopurinoL (ZYLOPRIM) 100 MG tablet Take 100 mg by mouth once daily.      amiodarone (PACERONE) 200 MG Tab Take 1 tablet (200 mg total) by mouth 2 (two) times daily. Take 1 tablet twice daily for 3 weeks and then decrease to 1 tablet daily. 90 tablet 3    aspirin (ECOTRIN) 81 MG EC tablet Take 81 mg by mouth. 1 Tablet, Delayed Release (E.C.) Oral Every day      atorvastatin (LIPITOR) 40 MG tablet Take 1 tablet (40 mg total) by mouth once daily. 90 tablet 3    carvediloL (COREG) 25 MG tablet Take 25 mg by mouth 2 (two) times a day.      digoxin (LANOXIN) 250 mcg tablet Take 250 mcg by mouth once daily.      furosemide (LASIX) 40 MG tablet Take 40 mg by mouth. 1 Tablet Oral Every day      indomethacin (INDOCIN) 50 MG capsule Take 50 mg by mouth as needed.      pantoprazole (PROTONIX) 40 MG tablet Take 40 mg by mouth once daily.      sacubitriL-valsartan (ENTRESTO) 24-26 mg per tablet Take 1 tablet by mouth 2 (two) times daily. 180 tablet 3    spironolactone (ALDACTONE) 25 MG tablet Take 25 mg by mouth once daily.       No current facility-administered medications for this visit.       Prior to Admission medications    Medication Sig Start Date End Date Taking?  Authorizing Provider   allopurinoL (ZYLOPRIM) 100 MG tablet Take 100 mg by mouth once daily. 7/9/20 7/9/21  Historical Provider   amiodarone (PACERONE) 200 MG Tab Take 1 tablet (200 mg total) by mouth 2 (two) times daily. Take 1 tablet twice daily for 3 weeks and then decrease to 1 tablet daily. 10/26/20 4/24/21  Senthil Rodríguez MD   aspirin (ECOTRIN) 81 MG EC tablet Take 81 mg by mouth. 1 Tablet, Delayed Release (E.C.) Oral Every day    Historical Provider   atorvastatin (LIPITOR) 40 MG tablet Take 1 tablet (40 mg total) by mouth once daily. 10/26/20 10/26/21  Senthil Rodríguez MD   carvediloL (COREG) 25 MG tablet Take 25 mg by mouth 2 (two) times a day. 1/23/20   Historical Provider   digoxin (LANOXIN) 250 mcg tablet Take 250 mcg by mouth once daily. 1/23/20   Historical Provider   furosemide (LASIX) 40 MG tablet Take 40 mg by mouth. 1 Tablet Oral Every day    Historical Provider   indomethacin (INDOCIN) 50 MG capsule Take 50 mg by mouth as needed.    Historical Provider   pantoprazole (PROTONIX) 40 MG tablet Take 40 mg by mouth once daily. 9/21/20   Historical Provider   sacubitriL-valsartan (ENTRESTO) 24-26 mg per tablet Take 1 tablet by mouth 2 (two) times daily. 11/17/20   Senthil Rodrígeuz MD   spironolactone (ALDACTONE) 25 MG tablet Take 25 mg by mouth once daily. 8/31/20   Historical Provider         History  No past medical history on file.  No past surgical history on file.  Social History     Socioeconomic History    Marital status:      Spouse name: Not on file    Number of children: Not on file    Years of education: Not on file    Highest education level: Not on file   Occupational History    Not on file   Social Needs    Financial resource strain: Not on file    Food insecurity     Worry: Not on file     Inability: Not on file    Transportation needs     Medical: Not on file     Non-medical: Not on file   Tobacco Use    Smoking status: Former Smoker     Types: Cigars    Smokeless  tobacco: Never Used   Substance and Sexual Activity    Alcohol use: Yes    Drug use: Not on file    Sexual activity: Not on file   Lifestyle    Physical activity     Days per week: Not on file     Minutes per session: Not on file    Stress: Not on file   Relationships    Social connections     Talks on phone: Not on file     Gets together: Not on file     Attends Orthodox service: Not on file     Active member of club or organization: Not on file     Attends meetings of clubs or organizations: Not on file     Relationship status: Not on file   Other Topics Concern    Not on file   Social History Narrative    Not on file         Allergies  Review of patient's allergies indicates:  No Known Allergies      Review of Systems   Review of Systems   Constitution: Negative for decreased appetite, fever and weight loss.   HENT: Negative for congestion and nosebleeds.    Eyes: Negative for blurred vision, double vision, vision loss in left eye, vision loss in right eye and visual disturbance.   Cardiovascular: Negative for chest pain, claudication, cyanosis, dyspnea on exertion, irregular heartbeat, leg swelling, near-syncope, orthopnea, palpitations, paroxysmal nocturnal dyspnea and syncope.   Respiratory: Negative for cough, hemoptysis, shortness of breath, sleep disturbances due to breathing, snoring, sputum production and wheezing.    Endocrine: Negative for cold intolerance and heat intolerance.   Skin: Negative for nail changes and rash.   Musculoskeletal: Negative for joint pain, muscle cramps, muscle weakness and myalgias.   Gastrointestinal: Negative for change in bowel habit, heartburn, hematemesis, hematochezia, hemorrhoids and melena.   Neurological: Negative for dizziness, focal weakness and headaches.         Physical Exam  Wt Readings from Last 1 Encounters:   11/17/20 (!) 152.4 kg (336 lb 1.5 oz)     BP Readings from Last 3 Encounters:   11/17/20 136/78   10/26/20 124/86   10/21/20 (!) 130/92      Pulse Readings from Last 1 Encounters:   11/17/20 90     There is no height or weight on file to calculate BMI.    Physical Exam   Constitutional: He is oriented to person, place, and time. He appears well-developed and well-nourished.   Cardiovascular: Normal rate, regular rhythm, S1 normal, S2 normal and normal heart sounds.   Pulses:       Carotid pulses are 2+ on the right side and 2+ on the left side.       Radial pulses are 2+ on the right side and 2+ on the left side.        Dorsalis pedis pulses are 2+ on the right side and 2+ on the left side.   L side ICD.   Pulmonary/Chest: Breath sounds normal.   Abdominal: Bowel sounds are normal.   Neurological: He is alert and oriented to person, place, and time.   Skin: Skin is warm and dry.   Vitals reviewed.                Assessment  1. VT (ventricular tachycardia)  improving    2. Nonischemic cardiomyopathy  Class II, stable    3. Mixed hyperlipidemia  stable    4. Essential hypertension  controlled    5. Automatic implantable cardioverter-defibrillator in situ  stable    6. Morbid obesity with BMI of 50.0-59.9, adult  unchanged        Plan and Discussion  Continue current meds.  Will investigate with pharmacy to see if Entresto is financially feasible for him.    Follow Up  3 months    Time spent evaluating and treating patient 20 minutes with >50% of this time being face-to-face.     Senthil Rodríguez MD, F.A.C.C, F.S.C.A.I.

## 2020-12-17 ENCOUNTER — PATIENT MESSAGE (OUTPATIENT)
Dept: CARDIOLOGY | Facility: CLINIC | Age: 55
End: 2020-12-17

## 2021-03-09 ENCOUNTER — OFFICE VISIT (OUTPATIENT)
Dept: CARDIOLOGY | Facility: CLINIC | Age: 56
End: 2021-03-09
Payer: MEDICARE

## 2021-03-09 VITALS
BODY MASS INDEX: 45.1 KG/M2 | WEIGHT: 315 LBS | DIASTOLIC BLOOD PRESSURE: 80 MMHG | OXYGEN SATURATION: 97 % | HEART RATE: 83 BPM | SYSTOLIC BLOOD PRESSURE: 136 MMHG | HEIGHT: 70 IN

## 2021-03-09 DIAGNOSIS — N52.1 ERECTILE DISORDER DUE TO MEDICAL CONDITION IN MALE: ICD-10-CM

## 2021-03-09 DIAGNOSIS — E78.2 MIXED HYPERLIPIDEMIA: ICD-10-CM

## 2021-03-09 DIAGNOSIS — I10 ESSENTIAL HYPERTENSION: ICD-10-CM

## 2021-03-09 DIAGNOSIS — I47.20 VT (VENTRICULAR TACHYCARDIA): Primary | ICD-10-CM

## 2021-03-09 DIAGNOSIS — I42.8 NONISCHEMIC CARDIOMYOPATHY: ICD-10-CM

## 2021-03-09 DIAGNOSIS — I10 ESSENTIAL (PRIMARY) HYPERTENSION: ICD-10-CM

## 2021-03-09 DIAGNOSIS — Z95.810 AUTOMATIC IMPLANTABLE CARDIOVERTER-DEFIBRILLATOR IN SITU: ICD-10-CM

## 2021-03-09 DIAGNOSIS — E66.01 MORBID OBESITY WITH BMI OF 50.0-59.9, ADULT: ICD-10-CM

## 2021-03-09 PROCEDURE — 99214 PR OFFICE/OUTPT VISIT, EST, LEVL IV, 30-39 MIN: ICD-10-PCS | Mod: S$PBB,,, | Performed by: INTERNAL MEDICINE

## 2021-03-09 PROCEDURE — 99214 OFFICE O/P EST MOD 30 MIN: CPT | Mod: S$PBB,,, | Performed by: INTERNAL MEDICINE

## 2021-03-09 PROCEDURE — 99213 OFFICE O/P EST LOW 20 MIN: CPT | Mod: PBBFAC,PN | Performed by: INTERNAL MEDICINE

## 2021-03-09 PROCEDURE — 99999 PR PBB SHADOW E&M-EST. PATIENT-LVL III: ICD-10-PCS | Mod: PBBFAC,,, | Performed by: INTERNAL MEDICINE

## 2021-03-09 PROCEDURE — 99999 PR PBB SHADOW E&M-EST. PATIENT-LVL III: CPT | Mod: PBBFAC,,, | Performed by: INTERNAL MEDICINE

## 2021-03-09 RX ORDER — SILDENAFIL 100 MG/1
100 TABLET, FILM COATED ORAL DAILY PRN
Qty: 10 TABLET | Refills: 3 | Status: SHIPPED | OUTPATIENT
Start: 2021-03-09 | End: 2024-01-31 | Stop reason: CLARIF

## 2021-03-29 RX ORDER — DIGOXIN 250 MCG
250 TABLET ORAL DAILY
Qty: 90 TABLET | Refills: 3 | Status: SHIPPED | OUTPATIENT
Start: 2021-03-29 | End: 2022-03-07 | Stop reason: SDUPTHER

## 2021-03-29 RX ORDER — SPIRONOLACTONE 25 MG/1
25 TABLET ORAL DAILY
Qty: 90 TABLET | Refills: 3 | Status: SHIPPED | OUTPATIENT
Start: 2021-03-29 | End: 2021-09-27

## 2021-05-17 RX ORDER — FUROSEMIDE 40 MG/1
40 TABLET ORAL DAILY
Qty: 90 TABLET | Refills: 3 | Status: SHIPPED | OUTPATIENT
Start: 2021-05-17 | End: 2022-03-07 | Stop reason: SDUPTHER

## 2021-06-07 ENCOUNTER — LAB VISIT (OUTPATIENT)
Dept: PRIMARY CARE CLINIC | Facility: CLINIC | Age: 56
End: 2021-06-07
Payer: MEDICARE

## 2021-06-07 DIAGNOSIS — E78.2 MIXED HYPERLIPIDEMIA: ICD-10-CM

## 2021-06-07 DIAGNOSIS — I47.20 VT (VENTRICULAR TACHYCARDIA): ICD-10-CM

## 2021-06-07 DIAGNOSIS — I10 ESSENTIAL (PRIMARY) HYPERTENSION: ICD-10-CM

## 2021-06-07 DIAGNOSIS — I42.8 NONISCHEMIC CARDIOMYOPATHY: ICD-10-CM

## 2021-06-07 PROCEDURE — 84443 ASSAY THYROID STIM HORMONE: CPT | Performed by: INTERNAL MEDICINE

## 2021-06-07 PROCEDURE — 80053 COMPREHEN METABOLIC PANEL: CPT | Performed by: INTERNAL MEDICINE

## 2021-06-07 PROCEDURE — 80061 LIPID PANEL: CPT | Performed by: INTERNAL MEDICINE

## 2021-06-07 PROCEDURE — 36415 COLL VENOUS BLD VENIPUNCTURE: CPT | Performed by: INTERNAL MEDICINE

## 2021-06-07 PROCEDURE — 36415 COLL VENOUS BLD VENIPUNCTURE: CPT | Mod: PBBFAC,PN

## 2021-06-08 LAB
ALBUMIN SERPL BCP-MCNC: 3.4 G/DL (ref 3.5–5.2)
ALP SERPL-CCNC: 75 U/L (ref 55–135)
ALT SERPL W/O P-5'-P-CCNC: 15 U/L (ref 10–44)
ANION GAP SERPL CALC-SCNC: 10 MMOL/L (ref 8–16)
AST SERPL-CCNC: 18 U/L (ref 10–40)
BILIRUB SERPL-MCNC: 0.6 MG/DL (ref 0.1–1)
BUN SERPL-MCNC: 22 MG/DL (ref 6–20)
CALCIUM SERPL-MCNC: 9.5 MG/DL (ref 8.7–10.5)
CHLORIDE SERPL-SCNC: 105 MMOL/L (ref 95–110)
CHOLEST SERPL-MCNC: 110 MG/DL (ref 120–199)
CHOLEST/HDLC SERPL: 2.8 {RATIO} (ref 2–5)
CO2 SERPL-SCNC: 27 MMOL/L (ref 23–29)
CREAT SERPL-MCNC: 1.2 MG/DL (ref 0.5–1.4)
EST. GFR  (AFRICAN AMERICAN): >60 ML/MIN/1.73 M^2
EST. GFR  (NON AFRICAN AMERICAN): >60 ML/MIN/1.73 M^2
GLUCOSE SERPL-MCNC: 88 MG/DL (ref 70–110)
HDLC SERPL-MCNC: 39 MG/DL (ref 40–75)
HDLC SERPL: 35.5 % (ref 20–50)
LDLC SERPL CALC-MCNC: 59.2 MG/DL (ref 63–159)
NONHDLC SERPL-MCNC: 71 MG/DL
POTASSIUM SERPL-SCNC: 4.7 MMOL/L (ref 3.5–5.1)
PROT SERPL-MCNC: 7.4 G/DL (ref 6–8.4)
SODIUM SERPL-SCNC: 142 MMOL/L (ref 136–145)
TRIGL SERPL-MCNC: 59 MG/DL (ref 30–150)
TSH SERPL DL<=0.005 MIU/L-ACNC: 2.52 UIU/ML (ref 0.4–4)

## 2021-06-10 ENCOUNTER — OFFICE VISIT (OUTPATIENT)
Dept: CARDIOLOGY | Facility: CLINIC | Age: 56
End: 2021-06-10
Payer: MEDICARE

## 2021-06-10 ENCOUNTER — HOSPITAL ENCOUNTER (OUTPATIENT)
Dept: RADIOLOGY | Facility: OTHER | Age: 56
Discharge: HOME | End: 2021-06-10
Attending: INTERNAL MEDICINE
Payer: MEDICARE

## 2021-06-10 VITALS
DIASTOLIC BLOOD PRESSURE: 74 MMHG | OXYGEN SATURATION: 98 % | WEIGHT: 315 LBS | SYSTOLIC BLOOD PRESSURE: 112 MMHG | BODY MASS INDEX: 45.1 KG/M2 | HEIGHT: 70 IN | HEART RATE: 73 BPM

## 2021-06-10 DIAGNOSIS — Z95.810 AUTOMATIC IMPLANTABLE CARDIOVERTER-DEFIBRILLATOR IN SITU: ICD-10-CM

## 2021-06-10 DIAGNOSIS — I47.20 VT (VENTRICULAR TACHYCARDIA): Primary | ICD-10-CM

## 2021-06-10 DIAGNOSIS — I42.8 NONISCHEMIC CARDIOMYOPATHY: ICD-10-CM

## 2021-06-10 DIAGNOSIS — E78.2 MIXED HYPERLIPIDEMIA: ICD-10-CM

## 2021-06-10 DIAGNOSIS — Z95.810 AUTOMATIC IMPLANTABLE CARDIOVERTER-DEFIBRILLATOR IN SITU: Primary | ICD-10-CM

## 2021-06-10 DIAGNOSIS — I10 ESSENTIAL HYPERTENSION: ICD-10-CM

## 2021-06-10 DIAGNOSIS — E66.01 MORBID OBESITY WITH BMI OF 50.0-59.9, ADULT: ICD-10-CM

## 2021-06-10 PROCEDURE — 99214 OFFICE O/P EST MOD 30 MIN: CPT | Mod: S$PBB,,, | Performed by: INTERNAL MEDICINE

## 2021-06-10 PROCEDURE — 99024 PR POST-OP FOLLOW-UP VISIT: ICD-10-PCS | Mod: POP,,, | Performed by: INTERNAL MEDICINE

## 2021-06-10 PROCEDURE — 71046 XR CHEST PA AND LATERAL: ICD-10-PCS | Mod: 26,,, | Performed by: RADIOLOGY

## 2021-06-10 PROCEDURE — 99213 OFFICE O/P EST LOW 20 MIN: CPT | Mod: PBBFAC | Performed by: INTERNAL MEDICINE

## 2021-06-10 PROCEDURE — 93283 PR PROGRAM EVAL (IN PERSON) IMPLANT DEVICE,CARDVERT/DEFIB,2 LEAD: ICD-10-PCS | Mod: 26,S$PBB,, | Performed by: INTERNAL MEDICINE

## 2021-06-10 PROCEDURE — 93283 PRGRMG EVAL IMPLANTABLE DFB: CPT | Mod: 26,S$PBB,, | Performed by: INTERNAL MEDICINE

## 2021-06-10 PROCEDURE — 99999 PR PBB SHADOW E&M-EST. PATIENT-LVL I: CPT | Mod: PBBFAC,,, | Performed by: INTERNAL MEDICINE

## 2021-06-10 PROCEDURE — 99214 PR OFFICE/OUTPT VISIT, EST, LEVL IV, 30-39 MIN: ICD-10-PCS | Mod: S$PBB,,, | Performed by: INTERNAL MEDICINE

## 2021-06-10 PROCEDURE — 71046 X-RAY EXAM CHEST 2 VIEWS: CPT | Mod: TC,FY

## 2021-06-10 PROCEDURE — 71046 X-RAY EXAM CHEST 2 VIEWS: CPT | Mod: 26,,, | Performed by: RADIOLOGY

## 2021-06-10 PROCEDURE — 99999 PR PBB SHADOW E&M-EST. PATIENT-LVL III: ICD-10-PCS | Mod: PBBFAC,,, | Performed by: INTERNAL MEDICINE

## 2021-06-10 PROCEDURE — 99024 POSTOP FOLLOW-UP VISIT: CPT | Mod: POP,,, | Performed by: INTERNAL MEDICINE

## 2021-06-10 PROCEDURE — 93283 PRGRMG EVAL IMPLANTABLE DFB: CPT | Mod: PBBFAC | Performed by: INTERNAL MEDICINE

## 2021-06-10 PROCEDURE — 99999 PR PBB SHADOW E&M-EST. PATIENT-LVL I: ICD-10-PCS | Mod: PBBFAC,,, | Performed by: INTERNAL MEDICINE

## 2021-06-10 PROCEDURE — 99211 OFF/OP EST MAY X REQ PHY/QHP: CPT | Mod: PBBFAC,25,27 | Performed by: INTERNAL MEDICINE

## 2021-06-10 PROCEDURE — 99999 PR PBB SHADOW E&M-EST. PATIENT-LVL III: CPT | Mod: PBBFAC,,, | Performed by: INTERNAL MEDICINE

## 2021-06-10 RX ORDER — AMIODARONE HYDROCHLORIDE 400 MG/1
400 TABLET ORAL DAILY
Qty: 90 TABLET | Refills: 3 | Status: SHIPPED | OUTPATIENT
Start: 2021-06-10 | End: 2022-03-07 | Stop reason: SDUPTHER

## 2021-06-11 ENCOUNTER — TELEPHONE (OUTPATIENT)
Dept: CARDIOLOGY | Facility: CLINIC | Age: 56
End: 2021-06-11

## 2021-07-08 ENCOUNTER — OUTSIDE PLACE OF SERVICE (OUTPATIENT)
Dept: CARDIOLOGY | Facility: CLINIC | Age: 56
End: 2021-07-08
Payer: MEDICARE

## 2021-07-08 ENCOUNTER — OUTSIDE PLACE OF SERVICE (OUTPATIENT)
Dept: CARDIOLOGY | Facility: CLINIC | Age: 56
End: 2021-07-08

## 2021-07-08 PROCEDURE — 93356 PR IMG, MYOCARDIAL STRAIN, SPECKLE TRACKING: ICD-10-PCS | Mod: ,,, | Performed by: INTERNAL MEDICINE

## 2021-07-08 PROCEDURE — 99232 PR SUBSEQUENT HOSPITAL CARE,LEVL II: ICD-10-PCS | Mod: 25,,, | Performed by: INTERNAL MEDICINE

## 2021-07-08 PROCEDURE — 99232 SBSQ HOSP IP/OBS MODERATE 35: CPT | Mod: 25,,, | Performed by: INTERNAL MEDICINE

## 2021-07-08 PROCEDURE — 93356 MYOCRD STRAIN IMG SPCKL TRCK: CPT | Mod: ,,, | Performed by: INTERNAL MEDICINE

## 2021-07-08 PROCEDURE — 93306 TTE W/DOPPLER COMPLETE: CPT | Mod: 26,,, | Performed by: INTERNAL MEDICINE

## 2021-07-08 PROCEDURE — 93306 PR ECHO HEART XTHORACIC,COMPLETE W DOPPLER: ICD-10-PCS | Mod: 26,,, | Performed by: INTERNAL MEDICINE

## 2021-07-09 ENCOUNTER — OUTSIDE PLACE OF SERVICE (OUTPATIENT)
Dept: CARDIOLOGY | Facility: CLINIC | Age: 56
End: 2021-07-09
Payer: MEDICARE

## 2021-07-09 PROCEDURE — 99232 SBSQ HOSP IP/OBS MODERATE 35: CPT | Mod: ,,, | Performed by: INTERNAL MEDICINE

## 2021-07-09 PROCEDURE — 99232 PR SUBSEQUENT HOSPITAL CARE,LEVL II: ICD-10-PCS | Mod: ,,, | Performed by: INTERNAL MEDICINE

## 2021-07-10 ENCOUNTER — OUTSIDE PLACE OF SERVICE (OUTPATIENT)
Dept: CARDIOLOGY | Facility: CLINIC | Age: 56
End: 2021-07-10
Payer: MEDICARE

## 2021-07-10 PROCEDURE — 99232 PR SUBSEQUENT HOSPITAL CARE,LEVL II: ICD-10-PCS | Mod: ,,, | Performed by: INTERNAL MEDICINE

## 2021-07-10 PROCEDURE — 99232 SBSQ HOSP IP/OBS MODERATE 35: CPT | Mod: ,,, | Performed by: INTERNAL MEDICINE

## 2021-07-11 ENCOUNTER — OUTSIDE PLACE OF SERVICE (OUTPATIENT)
Dept: CARDIOLOGY | Facility: CLINIC | Age: 56
End: 2021-07-11
Payer: MEDICARE

## 2021-07-11 PROCEDURE — 99232 PR SUBSEQUENT HOSPITAL CARE,LEVL II: ICD-10-PCS | Mod: ,,, | Performed by: INTERNAL MEDICINE

## 2021-07-11 PROCEDURE — 99232 SBSQ HOSP IP/OBS MODERATE 35: CPT | Mod: ,,, | Performed by: INTERNAL MEDICINE

## 2021-09-27 ENCOUNTER — HOSPITAL ENCOUNTER (OUTPATIENT)
Dept: CARDIOLOGY | Facility: OTHER | Age: 56
Discharge: HOME | End: 2021-09-27
Attending: INTERNAL MEDICINE
Payer: MEDICARE

## 2021-09-27 ENCOUNTER — OFFICE VISIT (OUTPATIENT)
Dept: CARDIOLOGY | Facility: CLINIC | Age: 56
End: 2021-09-27
Payer: MEDICARE

## 2021-09-27 VITALS
BODY MASS INDEX: 45.1 KG/M2 | HEIGHT: 70 IN | HEART RATE: 76 BPM | WEIGHT: 315 LBS | DIASTOLIC BLOOD PRESSURE: 88 MMHG | SYSTOLIC BLOOD PRESSURE: 130 MMHG

## 2021-09-27 VITALS
WEIGHT: 315 LBS | DIASTOLIC BLOOD PRESSURE: 74 MMHG | SYSTOLIC BLOOD PRESSURE: 112 MMHG | BODY MASS INDEX: 45.1 KG/M2 | HEIGHT: 70 IN

## 2021-09-27 DIAGNOSIS — E78.2 MIXED HYPERLIPIDEMIA: ICD-10-CM

## 2021-09-27 DIAGNOSIS — Z79.899 AT RISK FOR AMIODARONE TOXICITY WITH LONG TERM USE: ICD-10-CM

## 2021-09-27 DIAGNOSIS — Z95.810 AUTOMATIC IMPLANTABLE CARDIOVERTER-DEFIBRILLATOR IN SITU: Primary | ICD-10-CM

## 2021-09-27 DIAGNOSIS — I47.20 VT (VENTRICULAR TACHYCARDIA): Primary | ICD-10-CM

## 2021-09-27 DIAGNOSIS — I47.20 VT (VENTRICULAR TACHYCARDIA): ICD-10-CM

## 2021-09-27 DIAGNOSIS — I42.8 NONISCHEMIC CARDIOMYOPATHY: ICD-10-CM

## 2021-09-27 DIAGNOSIS — Z91.89 AT RISK FOR AMIODARONE TOXICITY WITH LONG TERM USE: ICD-10-CM

## 2021-09-27 DIAGNOSIS — I10 ESSENTIAL HYPERTENSION: ICD-10-CM

## 2021-09-27 LAB
ASCENDING AORTA: 2.95 CM
AV INDEX (PROSTH): 0.43
AV MEAN GRADIENT: 3 MMHG
AV PEAK GRADIENT: 6 MMHG
AV VALVE AREA: 2.35 CM2
AV VELOCITY RATIO: 0.42
BSA FOR ECHO PROCEDURE: 2.66 M2
CV ECHO LV RWT: 0.27 CM
DOP CALC AO PEAK VEL: 1.2 M/S
DOP CALC AO VTI: 20.84 CM
DOP CALC LVOT AREA: 5.5 CM2
DOP CALC LVOT DIAMETER: 2.64 CM
DOP CALC LVOT PEAK VEL: 0.5 M/S
DOP CALC LVOT STROKE VOLUME: 49.02 CM3
DOP CALCLVOT PEAK VEL VTI: 8.96 CM
E WAVE DECELERATION TIME: 143.22 MSEC
E/A RATIO: 0.72
E/E' RATIO: 10 M/S
ECHO LV POSTERIOR WALL: 1.09 CM (ref 0.6–1.1)
EJECTION FRACTION: 20 %
FRACTIONAL SHORTENING: 5 % (ref 28–44)
INTERVENTRICULAR SEPTUM: 1.09 CM (ref 0.6–1.1)
IVRT: 156.86 MSEC
LA MAJOR: 7.03 CM
LA MINOR: 6.39 CM
LA WIDTH: 5.14 CM
LEFT ATRIUM SIZE: 4.65 CM
LEFT ATRIUM VOLUME INDEX MOD: 37.8 ML/M2
LEFT ATRIUM VOLUME INDEX: 53.5 ML/M2
LEFT ATRIUM VOLUME MOD: 96 CM3
LEFT ATRIUM VOLUME: 136.01 CM3
LEFT INTERNAL DIMENSION IN SYSTOLE: 7.55 CM (ref 2.1–4)
LEFT VENTRICLE DIASTOLIC VOLUME INDEX: 132.89 ML/M2
LEFT VENTRICLE DIASTOLIC VOLUME: 337.54 ML
LEFT VENTRICLE MASS INDEX: 175 G/M2
LEFT VENTRICLE SYSTOLIC VOLUME INDEX: 119.2 ML/M2
LEFT VENTRICLE SYSTOLIC VOLUME: 302.86 ML
LEFT VENTRICULAR INTERNAL DIMENSION IN DIASTOLE: 7.93 CM (ref 3.5–6)
LEFT VENTRICULAR MASS: 445.46 G
LV LATERAL E/E' RATIO: 7.5 M/S
LV SEPTAL E/E' RATIO: 15 M/S
MV PEAK A VEL: 0.83 M/S
MV PEAK E VEL: 0.6 M/S
MV STENOSIS PRESSURE HALF TIME: 41.53 MS
MV VALVE AREA P 1/2 METHOD: 5.3 CM2
PISA TR MAX VEL: 1.97 M/S
PULM VEIN S/D RATIO: 1.66
PV PEAK D VEL: 0.44 M/S
PV PEAK S VEL: 0.73 M/S
PV PEAK VELOCITY: 0.76 CM/S
RA MAJOR: 4.22 CM
RA PRESSURE: 3 MMHG
RIGHT VENTRICULAR END-DIASTOLIC DIMENSION: 2.63 CM
RV TISSUE DOPPLER FREE WALL SYSTOLIC VELOCITY 1 (APICAL 4 CHAMBER VIEW): 12.22 CM/S
SINUS: 4.02 CM
STJ: 2.63 CM
TDI LATERAL: 0.08 M/S
TDI SEPTAL: 0.04 M/S
TDI: 0.06 M/S
TR MAX PG: 16 MMHG
TRICUSPID ANNULAR PLANE SYSTOLIC EXCURSION: 2.19 CM
TV REST PULMONARY ARTERY PRESSURE: 19 MMHG

## 2021-09-27 PROCEDURE — 99214 PR OFFICE/OUTPT VISIT, EST, LEVL IV, 30-39 MIN: ICD-10-PCS | Mod: S$PBB,25,, | Performed by: INTERNAL MEDICINE

## 2021-09-27 PROCEDURE — 99211 OFF/OP EST MAY X REQ PHY/QHP: CPT | Mod: PBBFAC,25,27 | Performed by: INTERNAL MEDICINE

## 2021-09-27 PROCEDURE — 99999 PR PBB SHADOW E&M-EST. PATIENT-LVL III: ICD-10-PCS | Mod: PBBFAC,,, | Performed by: INTERNAL MEDICINE

## 2021-09-27 PROCEDURE — 93283 PR PROGRAM EVAL (IN PERSON) IMPLANT DEVICE,CARDVERT/DEFIB,2 LEAD: ICD-10-PCS | Mod: 26,S$PBB,, | Performed by: INTERNAL MEDICINE

## 2021-09-27 PROCEDURE — 99999 PR PBB SHADOW E&M-EST. PATIENT-LVL I: ICD-10-PCS | Mod: PBBFAC,,, | Performed by: INTERNAL MEDICINE

## 2021-09-27 PROCEDURE — 93306 TTE W/DOPPLER COMPLETE: CPT | Mod: 26,,, | Performed by: INTERNAL MEDICINE

## 2021-09-27 PROCEDURE — 99024 POSTOP FOLLOW-UP VISIT: CPT | Mod: POP,,, | Performed by: INTERNAL MEDICINE

## 2021-09-27 PROCEDURE — 93283 PRGRMG EVAL IMPLANTABLE DFB: CPT | Mod: PBBFAC | Performed by: INTERNAL MEDICINE

## 2021-09-27 PROCEDURE — 99999 PR PBB SHADOW E&M-EST. PATIENT-LVL III: CPT | Mod: PBBFAC,,, | Performed by: INTERNAL MEDICINE

## 2021-09-27 PROCEDURE — 99213 OFFICE O/P EST LOW 20 MIN: CPT | Mod: PBBFAC,25 | Performed by: INTERNAL MEDICINE

## 2021-09-27 PROCEDURE — 93306 ECHO (CUPID ONLY): ICD-10-PCS | Mod: 26,,, | Performed by: INTERNAL MEDICINE

## 2021-09-27 PROCEDURE — 93306 TTE W/DOPPLER COMPLETE: CPT

## 2021-09-27 PROCEDURE — 99024 PR POST-OP FOLLOW-UP VISIT: ICD-10-PCS | Mod: POP,,, | Performed by: INTERNAL MEDICINE

## 2021-09-27 PROCEDURE — 93283 PRGRMG EVAL IMPLANTABLE DFB: CPT | Mod: 26,S$PBB,, | Performed by: INTERNAL MEDICINE

## 2021-09-27 PROCEDURE — 99999 PR PBB SHADOW E&M-EST. PATIENT-LVL I: CPT | Mod: PBBFAC,,, | Performed by: INTERNAL MEDICINE

## 2021-09-27 PROCEDURE — 99214 OFFICE O/P EST MOD 30 MIN: CPT | Mod: S$PBB,25,, | Performed by: INTERNAL MEDICINE

## 2021-09-27 RX ORDER — TAMSULOSIN HYDROCHLORIDE 0.4 MG/1
0.4 CAPSULE ORAL DAILY
COMMUNITY

## 2021-12-28 ENCOUNTER — OFFICE VISIT (OUTPATIENT)
Dept: CARDIOLOGY | Facility: CLINIC | Age: 56
End: 2021-12-28
Payer: MEDICARE

## 2021-12-28 ENCOUNTER — LAB VISIT (OUTPATIENT)
Dept: PRIMARY CARE CLINIC | Facility: CLINIC | Age: 56
End: 2021-12-28
Payer: MEDICARE

## 2021-12-28 VITALS
DIASTOLIC BLOOD PRESSURE: 90 MMHG | WEIGHT: 282.63 LBS | OXYGEN SATURATION: 98 % | BODY MASS INDEX: 40.46 KG/M2 | HEART RATE: 64 BPM | HEIGHT: 70 IN | SYSTOLIC BLOOD PRESSURE: 142 MMHG

## 2021-12-28 DIAGNOSIS — I10 ESSENTIAL HYPERTENSION: ICD-10-CM

## 2021-12-28 DIAGNOSIS — Z79.899 ON AMIODARONE THERAPY: ICD-10-CM

## 2021-12-28 DIAGNOSIS — R42 DIZZINESS: ICD-10-CM

## 2021-12-28 DIAGNOSIS — I47.20 VT (VENTRICULAR TACHYCARDIA): Primary | ICD-10-CM

## 2021-12-28 DIAGNOSIS — I47.20 VT (VENTRICULAR TACHYCARDIA): ICD-10-CM

## 2021-12-28 DIAGNOSIS — I42.8 NONISCHEMIC CARDIOMYOPATHY: ICD-10-CM

## 2021-12-28 DIAGNOSIS — E78.2 MIXED HYPERLIPIDEMIA: ICD-10-CM

## 2021-12-28 DIAGNOSIS — I11.0 HYPERTENSIVE HEART DISEASE WITH HEART FAILURE: ICD-10-CM

## 2021-12-28 LAB
ALBUMIN SERPL BCP-MCNC: 3.3 G/DL (ref 3.5–5.2)
ALP SERPL-CCNC: 82 U/L (ref 55–135)
ALT SERPL W/O P-5'-P-CCNC: 11 U/L (ref 10–44)
ANION GAP SERPL CALC-SCNC: 8 MMOL/L (ref 8–16)
AST SERPL-CCNC: 15 U/L (ref 10–40)
BILIRUB SERPL-MCNC: 0.6 MG/DL (ref 0.1–1)
BUN SERPL-MCNC: 20 MG/DL (ref 6–20)
CALCIUM SERPL-MCNC: 9.3 MG/DL (ref 8.7–10.5)
CHLORIDE SERPL-SCNC: 102 MMOL/L (ref 95–110)
CHOLEST SERPL-MCNC: 149 MG/DL (ref 120–199)
CHOLEST/HDLC SERPL: 2.8 {RATIO} (ref 2–5)
CO2 SERPL-SCNC: 33 MMOL/L (ref 23–29)
CREAT SERPL-MCNC: 1.1 MG/DL (ref 0.5–1.4)
DIGOXIN SERPL-MCNC: 1 NG/ML (ref 0.8–2)
ERYTHROCYTE [DISTWIDTH] IN BLOOD BY AUTOMATED COUNT: 15 % (ref 11.5–14.5)
EST. GFR  (AFRICAN AMERICAN): >60 ML/MIN/1.73 M^2
EST. GFR  (NON AFRICAN AMERICAN): >60 ML/MIN/1.73 M^2
GLUCOSE SERPL-MCNC: 89 MG/DL (ref 70–110)
HCT VFR BLD AUTO: 44.6 % (ref 40–54)
HDLC SERPL-MCNC: 53 MG/DL (ref 40–75)
HDLC SERPL: 35.6 % (ref 20–50)
HGB BLD-MCNC: 13.6 G/DL (ref 14–18)
LDLC SERPL CALC-MCNC: 85.2 MG/DL (ref 63–159)
MCH RBC QN AUTO: 27.5 PG (ref 27–31)
MCHC RBC AUTO-ENTMCNC: 30.5 G/DL (ref 32–36)
MCV RBC AUTO: 90 FL (ref 82–98)
NONHDLC SERPL-MCNC: 96 MG/DL
PLATELET # BLD AUTO: 283 K/UL (ref 150–450)
PMV BLD AUTO: 10.2 FL (ref 9.2–12.9)
POTASSIUM SERPL-SCNC: 4.1 MMOL/L (ref 3.5–5.1)
PROT SERPL-MCNC: 7.5 G/DL (ref 6–8.4)
RBC # BLD AUTO: 4.95 M/UL (ref 4.6–6.2)
SODIUM SERPL-SCNC: 143 MMOL/L (ref 136–145)
TRIGL SERPL-MCNC: 54 MG/DL (ref 30–150)
TSH SERPL DL<=0.005 MIU/L-ACNC: 2.54 UIU/ML (ref 0.4–4)
WBC # BLD AUTO: 6.57 K/UL (ref 3.9–12.7)

## 2021-12-28 PROCEDURE — 80061 LIPID PANEL: CPT | Performed by: INTERNAL MEDICINE

## 2021-12-28 PROCEDURE — 80053 COMPREHEN METABOLIC PANEL: CPT | Performed by: INTERNAL MEDICINE

## 2021-12-28 PROCEDURE — 80162 ASSAY OF DIGOXIN TOTAL: CPT | Performed by: INTERNAL MEDICINE

## 2021-12-28 PROCEDURE — 99999 PR PBB SHADOW E&M-EST. PATIENT-LVL III: CPT | Mod: PBBFAC,,, | Performed by: INTERNAL MEDICINE

## 2021-12-28 PROCEDURE — 36415 COLL VENOUS BLD VENIPUNCTURE: CPT | Mod: PBBFAC,PN

## 2021-12-28 PROCEDURE — 99214 PR OFFICE/OUTPT VISIT, EST, LEVL IV, 30-39 MIN: ICD-10-PCS | Mod: S$PBB,,, | Performed by: INTERNAL MEDICINE

## 2021-12-28 PROCEDURE — 99999 PR PBB SHADOW E&M-EST. PATIENT-LVL III: ICD-10-PCS | Mod: PBBFAC,,, | Performed by: INTERNAL MEDICINE

## 2021-12-28 PROCEDURE — 36415 COLL VENOUS BLD VENIPUNCTURE: CPT | Performed by: INTERNAL MEDICINE

## 2021-12-28 PROCEDURE — 85027 COMPLETE CBC AUTOMATED: CPT | Performed by: INTERNAL MEDICINE

## 2021-12-28 PROCEDURE — 99213 OFFICE O/P EST LOW 20 MIN: CPT | Mod: PBBFAC,PN | Performed by: INTERNAL MEDICINE

## 2021-12-28 PROCEDURE — 84443 ASSAY THYROID STIM HORMONE: CPT | Performed by: INTERNAL MEDICINE

## 2021-12-28 PROCEDURE — 99214 OFFICE O/P EST MOD 30 MIN: CPT | Mod: S$PBB,,, | Performed by: INTERNAL MEDICINE

## 2021-12-28 RX ORDER — LOSARTAN POTASSIUM 50 MG/1
50 TABLET ORAL DAILY
COMMUNITY
End: 2022-03-07 | Stop reason: SDUPTHER

## 2021-12-28 RX ORDER — MECLIZINE HYDROCHLORIDE 25 MG/1
25 TABLET ORAL 3 TIMES DAILY PRN
Qty: 30 TABLET | Refills: 1 | Status: SHIPPED | OUTPATIENT
Start: 2021-12-28 | End: 2022-04-04 | Stop reason: SDUPTHER

## 2021-12-28 RX ORDER — GABAPENTIN 300 MG/1
300 CAPSULE ORAL NIGHTLY
COMMUNITY
Start: 2021-12-17 | End: 2023-11-29 | Stop reason: SDUPTHER

## 2022-01-19 ENCOUNTER — OFFICE VISIT (OUTPATIENT)
Dept: CARDIOLOGY | Facility: CLINIC | Age: 57
End: 2022-01-19
Payer: MEDICARE

## 2022-01-19 VITALS
HEART RATE: 68 BPM | BODY MASS INDEX: 39.74 KG/M2 | SYSTOLIC BLOOD PRESSURE: 136 MMHG | OXYGEN SATURATION: 98 % | WEIGHT: 277.56 LBS | HEIGHT: 70 IN | DIASTOLIC BLOOD PRESSURE: 86 MMHG

## 2022-01-19 DIAGNOSIS — I42.8 NONISCHEMIC CARDIOMYOPATHY: Primary | ICD-10-CM

## 2022-01-19 DIAGNOSIS — Z95.810 AUTOMATIC IMPLANTABLE CARDIOVERTER-DEFIBRILLATOR IN SITU: ICD-10-CM

## 2022-01-19 DIAGNOSIS — E78.2 MIXED HYPERLIPIDEMIA: ICD-10-CM

## 2022-01-19 DIAGNOSIS — I10 ESSENTIAL HYPERTENSION: ICD-10-CM

## 2022-01-19 PROCEDURE — 99999 PR PBB SHADOW E&M-EST. PATIENT-LVL III: ICD-10-PCS | Mod: PBBFAC,,, | Performed by: INTERNAL MEDICINE

## 2022-01-19 PROCEDURE — 99999 PR PBB SHADOW E&M-EST. PATIENT-LVL III: CPT | Mod: PBBFAC,,, | Performed by: INTERNAL MEDICINE

## 2022-01-19 PROCEDURE — 99213 OFFICE O/P EST LOW 20 MIN: CPT | Mod: PBBFAC,PN | Performed by: INTERNAL MEDICINE

## 2022-01-19 PROCEDURE — 99214 PR OFFICE/OUTPT VISIT, EST, LEVL IV, 30-39 MIN: ICD-10-PCS | Mod: S$PBB,,, | Performed by: INTERNAL MEDICINE

## 2022-01-19 PROCEDURE — 99214 OFFICE O/P EST MOD 30 MIN: CPT | Mod: S$PBB,,, | Performed by: INTERNAL MEDICINE

## 2022-01-19 RX ORDER — ALPRAZOLAM 0.25 MG/1
0.25 TABLET ORAL 2 TIMES DAILY PRN
Qty: 3 TABLET | Refills: 1 | Status: SHIPPED | OUTPATIENT
Start: 2022-01-19 | End: 2022-01-20

## 2022-01-19 RX ORDER — ATORVASTATIN CALCIUM 40 MG/1
40 TABLET, FILM COATED ORAL DAILY
Qty: 90 TABLET | Refills: 3 | Status: SHIPPED | OUTPATIENT
Start: 2022-01-19 | End: 2022-03-07 | Stop reason: SDUPTHER

## 2022-01-19 RX ORDER — CARVEDILOL 25 MG/1
25 TABLET ORAL 2 TIMES DAILY
Qty: 180 TABLET | Refills: 3 | Status: SHIPPED | OUTPATIENT
Start: 2022-01-19 | End: 2022-03-07 | Stop reason: SDUPTHER

## 2022-01-19 NOTE — PROGRESS NOTES
Cardiology    1/19/2022  9:11 AM    Problem list  Patient Active Problem List   Diagnosis    Automatic implantable cardioverter-defibrillator in situ    Essential hypertension    Mixed hyperlipidemia    Nonischemic cardiomyopathy    VT (ventricular tachycardia)    BMI 39.0-39.9,adult       CC:  f/u    HPI:  At last visit, meclizine was given for dizziness which has resolved.  He went to [facility] ER yesterday with L sided CP worse with inspiration and bending over.  He was discharged with atypical CP.  Reassured him that his coronary angiogram was normal in 2019.  He missed his appt with nephrologist last week and rescheduled for next month (his losartan and aldactone were stopped and losartan resumed).  No angina, FELDMAN, PND, palpitations, ICD shock.    Medications  Current Outpatient Medications   Medication Sig Dispense Refill    aspirin (ECOTRIN) 81 MG EC tablet Take 81 mg by mouth. 1 Tablet, Delayed Release (E.C.) Oral Every day      digoxin (LANOXIN) 250 mcg tablet Take 1 tablet (250 mcg total) by mouth once daily. 90 tablet 3    furosemide (LASIX) 40 MG tablet Take 1 tablet (40 mg total) by mouth once daily. 1 Tablet Oral Every day 90 tablet 3    losartan (COZAAR) 50 MG tablet Take 50 mg by mouth once daily.      meclizine (ANTIVERT) 25 mg tablet Take 1 tablet (25 mg total) by mouth 3 (three) times daily as needed for Dizziness. 30 tablet 1    sildenafiL (VIAGRA) 100 MG tablet Take 1 tablet (100 mg total) by mouth daily as needed for Erectile Dysfunction. 10 tablet 3    ALPRAZolam (XANAX) 0.25 MG tablet Take 1 tablet (0.25 mg total) by mouth 2 (two) times daily as needed for Anxiety. 3 tablet 1    amiodarone (PACERONE) 400 MG tablet Take 1 tablet (400 mg total) by mouth once daily. Take 1 tablet twice daily for 3 weeks and then decrease to 1 tablet daily. 90 tablet 3    atorvastatin (LIPITOR) 40 MG tablet Take 1 tablet (40 mg total) by mouth once daily. 90 tablet 3    carvediloL (COREG) 25 MG  tablet Take 1 tablet (25 mg total) by mouth 2 (two) times a day. 180 tablet 3    gabapentin (NEURONTIN) 300 MG capsule Take 300 mg by mouth nightly.      indomethacin (INDOCIN) 50 MG capsule Take 50 mg by mouth as needed.      pantoprazole (PROTONIX) 40 MG tablet Take 1 tablet (40 mg total) by mouth once daily. (Patient not taking: Reported on 1/19/2022) 30 tablet 3    tamsulosin (FLOMAX) 0.4 mg Cap Take by mouth once daily.       No current facility-administered medications for this visit.      Prior to Admission medications    Medication Sig Start Date End Date Taking? Authorizing Provider   aspirin (ECOTRIN) 81 MG EC tablet Take 81 mg by mouth. 1 Tablet, Delayed Release (E.C.) Oral Every day   Yes Historical Provider   digoxin (LANOXIN) 250 mcg tablet Take 1 tablet (250 mcg total) by mouth once daily. 3/29/21  Yes Senthil Rodríguez MD   furosemide (LASIX) 40 MG tablet Take 1 tablet (40 mg total) by mouth once daily. 1 Tablet Oral Every day 5/17/21  Yes Senthil Rodríguez MD   losartan (COZAAR) 50 MG tablet Take 50 mg by mouth once daily.   Yes Historical Provider   meclizine (ANTIVERT) 25 mg tablet Take 1 tablet (25 mg total) by mouth 3 (three) times daily as needed for Dizziness. 12/28/21  Yes Senthil Rodríguez MD   sildenafiL (VIAGRA) 100 MG tablet Take 1 tablet (100 mg total) by mouth daily as needed for Erectile Dysfunction. 3/9/21 3/9/22 Yes Senthil Rodríguez MD   carvediloL (COREG) 25 MG tablet Take 25 mg by mouth 2 (two) times a day. 1/23/20 1/19/22 Yes Historical Provider   ALPRAZolam (XANAX) 0.25 MG tablet Take 1 tablet (0.25 mg total) by mouth 2 (two) times daily as needed for Anxiety. 1/19/22 2/18/22  Senthil Rodríguez MD   amiodarone (PACERONE) 400 MG tablet Take 1 tablet (400 mg total) by mouth once daily. Take 1 tablet twice daily for 3 weeks and then decrease to 1 tablet daily. 6/10/21 12/7/21  Senthil Rodríguez MD   atorvastatin (LIPITOR) 40 MG tablet Take 1 tablet (40 mg total) by mouth once daily.  1/19/22 1/19/23  Senthil Rodríguez MD   carvediloL (COREG) 25 MG tablet Take 1 tablet (25 mg total) by mouth 2 (two) times a day. 1/19/22   Senthil Rodríguez MD   gabapentin (NEURONTIN) 300 MG capsule Take 300 mg by mouth nightly. 12/17/21   Historical Provider   indomethacin (INDOCIN) 50 MG capsule Take 50 mg by mouth as needed.    Historical Provider   pantoprazole (PROTONIX) 40 MG tablet Take 1 tablet (40 mg total) by mouth once daily.  Patient not taking: Reported on 1/19/2022 12/10/20   Senthil Rodríguez MD   tamsulosin (FLOMAX) 0.4 mg Cap Take by mouth once daily.    Historical Provider   atorvastatin (LIPITOR) 40 MG tablet Take 1 tablet (40 mg total) by mouth once daily. 10/26/20 1/19/22  Senthil Rodríguez MD         History  No past medical history on file.  No past surgical history on file.  Social History     Socioeconomic History    Marital status:    Tobacco Use    Smoking status: Former Smoker     Types: Cigars    Smokeless tobacco: Never Used   Substance and Sexual Activity    Alcohol use: Yes         Allergies  Review of patient's allergies indicates:   Allergen Reactions    Ace inhibitors      cough    Adhesive      Skin rash         Review of Systems   Review of Systems   Constitutional: Negative for decreased appetite, fever and weight loss.   HENT: Negative for congestion and nosebleeds.    Eyes: Negative for blurred vision, double vision, vision loss in left eye, vision loss in right eye and visual disturbance.   Cardiovascular: Negative for chest pain, claudication, cyanosis, dyspnea on exertion, irregular heartbeat, leg swelling, near-syncope, orthopnea, palpitations, paroxysmal nocturnal dyspnea and syncope.   Respiratory: Negative for cough, hemoptysis, shortness of breath, sleep disturbances due to breathing, snoring, sputum production and wheezing.    Endocrine: Negative for cold intolerance and heat intolerance.   Skin: Negative for nail changes and rash.   Musculoskeletal:  Negative for joint pain, muscle cramps, muscle weakness and myalgias.   Gastrointestinal: Negative for change in bowel habit, heartburn, hematemesis, hematochezia, hemorrhoids and melena.   Neurological: Negative for dizziness, focal weakness and headaches.         Physical Exam  Wt Readings from Last 1 Encounters:   01/19/22 125.9 kg (277 lb 9 oz)     BP Readings from Last 3 Encounters:   01/19/22 136/86   12/28/21 (!) 142/90   09/27/21 112/74     Pulse Readings from Last 1 Encounters:   01/19/22 68     Body mass index is 39.83 kg/m².    Physical Exam  Vitals reviewed.   Constitutional:       Appearance: He is well-developed.   Cardiovascular:      Rate and Rhythm: Normal rate and regular rhythm.      Pulses:           Carotid pulses are 2+ on the right side and 2+ on the left side.       Radial pulses are 2+ on the right side and 2+ on the left side.        Dorsalis pedis pulses are 2+ on the right side and 2+ on the left side.      Heart sounds: Normal heart sounds, S1 normal and S2 normal.      Comments: L side ICD.  Pulmonary:      Breath sounds: Normal breath sounds.   Abdominal:      General: Bowel sounds are normal.   Skin:     General: Skin is warm and dry.   Neurological:      Mental Status: He is alert and oriented to person, place, and time.                   Assessment  1. Mixed hyperlipidemia  On statin  - atorvastatin (LIPITOR) 40 MG tablet; Take 1 tablet (40 mg total) by mouth once daily.  Dispense: 90 tablet; Refill: 3    2. Nonischemic cardiomyopathy  stable    3. Essential hypertension  Controlled     4. Automatic implantable cardioverter-defibrillator in situ  stable    5. BMI 39.0-39.9,adult  Improving, down from BMI 40.        Plan and Discussion  Continue current meds.  Encourage to exercise at least 30 minutes per day, 5 days per week.  F/u with Dr. Fortune (renal).  F/u for device check.      Follow Up  3 months      Senthil Rodríguez MD, F.A.C.C, F.S.C.A.I.

## 2022-01-20 RX ORDER — ALPRAZOLAM 0.25 MG/1
0.25 TABLET ORAL 2 TIMES DAILY PRN
Qty: 30 TABLET | Refills: 1 | Status: SHIPPED | OUTPATIENT
Start: 2022-01-20 | End: 2022-04-05 | Stop reason: SDUPTHER

## 2022-03-07 DIAGNOSIS — E78.2 MIXED HYPERLIPIDEMIA: ICD-10-CM

## 2022-03-07 DIAGNOSIS — I47.20 VT (VENTRICULAR TACHYCARDIA): ICD-10-CM

## 2022-03-07 DIAGNOSIS — I42.8 NONISCHEMIC CARDIOMYOPATHY: Primary | ICD-10-CM

## 2022-03-07 RX ORDER — ATORVASTATIN CALCIUM 40 MG/1
40 TABLET, FILM COATED ORAL DAILY
Qty: 90 TABLET | Refills: 3 | Status: SHIPPED | OUTPATIENT
Start: 2022-03-07 | End: 2022-04-04 | Stop reason: SDUPTHER

## 2022-03-07 RX ORDER — LOSARTAN POTASSIUM 50 MG/1
50 TABLET ORAL DAILY
Qty: 90 TABLET | Refills: 3 | Status: SHIPPED | OUTPATIENT
Start: 2022-03-07 | End: 2022-04-04 | Stop reason: SDUPTHER

## 2022-03-07 RX ORDER — FUROSEMIDE 40 MG/1
40 TABLET ORAL DAILY
Qty: 90 TABLET | Refills: 3 | Status: SHIPPED | OUTPATIENT
Start: 2022-03-07 | End: 2022-04-04 | Stop reason: SDUPTHER

## 2022-03-07 RX ORDER — AMIODARONE HYDROCHLORIDE 400 MG/1
400 TABLET ORAL DAILY
Qty: 90 TABLET | Refills: 3 | Status: SHIPPED | OUTPATIENT
Start: 2022-03-07 | End: 2022-04-04 | Stop reason: SDUPTHER

## 2022-03-07 RX ORDER — CARVEDILOL 25 MG/1
25 TABLET ORAL 2 TIMES DAILY
Qty: 180 TABLET | Refills: 3 | Status: SHIPPED | OUTPATIENT
Start: 2022-03-07 | End: 2022-04-04 | Stop reason: SDUPTHER

## 2022-03-07 RX ORDER — DIGOXIN 250 MCG
250 TABLET ORAL DAILY
Qty: 90 TABLET | Refills: 3 | Status: SHIPPED | OUTPATIENT
Start: 2022-03-07 | End: 2022-04-04 | Stop reason: SDUPTHER

## 2022-04-04 DIAGNOSIS — E78.2 MIXED HYPERLIPIDEMIA: ICD-10-CM

## 2022-04-04 DIAGNOSIS — R42 DIZZINESS: ICD-10-CM

## 2022-04-04 DIAGNOSIS — I47.20 VT (VENTRICULAR TACHYCARDIA): ICD-10-CM

## 2022-04-04 DIAGNOSIS — K21.9 GASTROESOPHAGEAL REFLUX DISEASE, UNSPECIFIED WHETHER ESOPHAGITIS PRESENT: ICD-10-CM

## 2022-04-04 DIAGNOSIS — I42.8 NONISCHEMIC CARDIOMYOPATHY: ICD-10-CM

## 2022-04-04 RX ORDER — PANTOPRAZOLE SODIUM 40 MG/1
40 TABLET, DELAYED RELEASE ORAL DAILY
Qty: 30 TABLET | Refills: 3 | Status: SHIPPED | OUTPATIENT
Start: 2022-04-04 | End: 2023-08-09

## 2022-04-04 RX ORDER — DIGOXIN 250 MCG
250 TABLET ORAL DAILY
Qty: 90 TABLET | Refills: 3 | Status: SHIPPED | OUTPATIENT
Start: 2022-04-04 | End: 2024-01-08 | Stop reason: SDUPTHER

## 2022-04-04 RX ORDER — MECLIZINE HYDROCHLORIDE 25 MG/1
25 TABLET ORAL 3 TIMES DAILY PRN
Qty: 30 TABLET | Refills: 1 | Status: SHIPPED | OUTPATIENT
Start: 2022-04-04 | End: 2024-01-08 | Stop reason: SDUPTHER

## 2022-04-04 RX ORDER — FUROSEMIDE 40 MG/1
40 TABLET ORAL DAILY
Qty: 90 TABLET | Refills: 3 | Status: SHIPPED | OUTPATIENT
Start: 2022-04-04 | End: 2024-01-08 | Stop reason: SDUPTHER

## 2022-04-04 RX ORDER — AMIODARONE HYDROCHLORIDE 400 MG/1
400 TABLET ORAL DAILY
Qty: 90 TABLET | Refills: 3 | Status: SHIPPED | OUTPATIENT
Start: 2022-04-04 | End: 2022-04-05

## 2022-04-04 RX ORDER — ATORVASTATIN CALCIUM 40 MG/1
40 TABLET, FILM COATED ORAL DAILY
Qty: 90 TABLET | Refills: 3 | Status: SHIPPED | OUTPATIENT
Start: 2022-04-04 | End: 2023-06-01

## 2022-04-04 RX ORDER — LOSARTAN POTASSIUM 50 MG/1
50 TABLET ORAL DAILY
Qty: 90 TABLET | Refills: 3 | Status: SHIPPED | OUTPATIENT
Start: 2022-04-04 | End: 2023-06-01

## 2022-04-04 RX ORDER — CARVEDILOL 25 MG/1
25 TABLET ORAL 2 TIMES DAILY
Qty: 180 TABLET | Refills: 3 | Status: SHIPPED | OUTPATIENT
Start: 2022-04-04 | End: 2024-01-08 | Stop reason: SDUPTHER

## 2022-04-04 RX ORDER — ASPIRIN 81 MG/1
81 TABLET ORAL ONCE
Qty: 90 TABLET | Refills: 3 | Status: SHIPPED | OUTPATIENT
Start: 2022-04-04 | End: 2024-03-28

## 2022-04-04 NOTE — TELEPHONE ENCOUNTER
----- Message from Bridget Cordova sent at 4/4/2022 11:46 AM CDT -----  Regarding: REFILL REQUEST  Who Called:JEWELS WALLACE [[id number]]         Refill or New Rx   Refill         RX Name and Strength:  digoxin (LANOXIN) 250 mcg tablet, furosemide (LASIX) 40 MG tablet, losartan (COZAAR) 50 MG tablet, amiodarone (PACERONE) 400 MG tablet , atorvastatin (LIPITOR) 40 MG tablet, carvediloL (COREG) 25 MG tablet, ALPRAZolam (XANAX) 0.25 MG tablet, meclizine (ANTIVERT) 25 mg tablet, gabapentin (NEURONTIN) 300 MG capsule. tamsulosin (FLOMAX) 0.4 mg Cap, sildenafiL (VIAGRA) 100 MG tablet, pantoprazole (PROTONIX) 40 MG tablet, indomethacin (INDOCIN) 50 MG capsule, aspirin (ECOTRIN) 81 MG EC tablet         How is the patient currently taking it? (ex. 1XDay):        Is this a 30 day or 90 day RX:        [facility] with phone number[facility]         Local or Mail Order: Local        Ordering Provider: Senthil Rodríguez         Would the patient rather a call back or a response via [facility]? No         Best Call Back Number:[phone]         Additional Information:

## 2022-04-05 DIAGNOSIS — F41.9 ANXIETY: ICD-10-CM

## 2022-04-05 DIAGNOSIS — I47.20 VT (VENTRICULAR TACHYCARDIA): Primary | ICD-10-CM

## 2022-04-05 RX ORDER — ALPRAZOLAM 0.25 MG/1
0.25 TABLET ORAL 2 TIMES DAILY PRN
Qty: 30 TABLET | Refills: 1 | Status: SHIPPED | OUTPATIENT
Start: 2022-04-05 | End: 2022-04-06 | Stop reason: SDUPTHER

## 2022-04-05 RX ORDER — AMIODARONE HYDROCHLORIDE 200 MG/1
400 TABLET ORAL DAILY
Qty: 180 TABLET | Refills: 3 | Status: SHIPPED | OUTPATIENT
Start: 2022-04-05 | End: 2022-04-06 | Stop reason: SDUPTHER

## 2022-04-05 NOTE — TELEPHONE ENCOUNTER
Spoke to pt about medication. Pt is requesting Amiodarone 200mg (2 tabs) daily instead of Amiodarone 400mg daily due to cost. Will send in new Rx.

## 2022-04-05 NOTE — TELEPHONE ENCOUNTER
----- Message from Bridget Tasha sent at 4/5/2022 10:43 AM CDT -----  Regarding: Patient requesting call back  Name of Who is Calling: JEWELS WALLACE [[id number]]          What is the request in detail: patient is requesting a call back in reference to medication           Can the clinic reply by [facility]: no           What Number to Call Back if not in [facility]: [phone]

## 2022-04-06 DIAGNOSIS — F41.9 ANXIETY: ICD-10-CM

## 2022-04-06 DIAGNOSIS — I47.20 VT (VENTRICULAR TACHYCARDIA): ICD-10-CM

## 2022-04-06 RX ORDER — ALPRAZOLAM 0.25 MG/1
0.25 TABLET ORAL 2 TIMES DAILY PRN
Qty: 30 TABLET | Refills: 1 | Status: SHIPPED | OUTPATIENT
Start: 2022-04-06 | End: 2022-05-06

## 2022-04-06 RX ORDER — AMIODARONE HYDROCHLORIDE 200 MG/1
400 TABLET ORAL DAILY
Qty: 180 TABLET | Refills: 3 | Status: SHIPPED | OUTPATIENT
Start: 2022-04-06 | End: 2023-06-01

## 2022-04-06 NOTE — TELEPHONE ENCOUNTER
----- Message from Chelsey Sal sent at 4/6/2022  8:56 AM CDT -----  Who called?:PT      What is the request in detail:PT would like his prescription for amiodarone (PACERONE) 200 MG Tab and ALPRAZolam (XANAX) 0.25 MG tablet  to be sent to [id number] [address] [place] [address]   Phone:  [phone]  Fax:  [phone]        Can the clinic reply by [facility]?:No        Best Call Back Number:[phone]

## 2022-04-14 ENCOUNTER — OFFICE VISIT (OUTPATIENT)
Dept: CARDIOLOGY | Facility: CLINIC | Age: 57
End: 2022-04-14
Payer: MEDICARE

## 2022-04-14 VITALS
BODY MASS INDEX: 39.84 KG/M2 | HEIGHT: 70 IN | DIASTOLIC BLOOD PRESSURE: 58 MMHG | WEIGHT: 278.31 LBS | OXYGEN SATURATION: 96 % | HEART RATE: 55 BPM | SYSTOLIC BLOOD PRESSURE: 124 MMHG

## 2022-04-14 DIAGNOSIS — I10 ESSENTIAL HYPERTENSION: ICD-10-CM

## 2022-04-14 DIAGNOSIS — Z79.899 AT RISK FOR AMIODARONE TOXICITY WITH LONG TERM USE: ICD-10-CM

## 2022-04-14 DIAGNOSIS — Z95.810 AUTOMATIC IMPLANTABLE CARDIOVERTER-DEFIBRILLATOR IN SITU: Primary | ICD-10-CM

## 2022-04-14 DIAGNOSIS — Z91.89 AT RISK FOR AMIODARONE TOXICITY WITH LONG TERM USE: ICD-10-CM

## 2022-04-14 DIAGNOSIS — Z95.810 AUTOMATIC IMPLANTABLE CARDIOVERTER-DEFIBRILLATOR IN SITU: ICD-10-CM

## 2022-04-14 DIAGNOSIS — E78.5 HYPERLIPIDEMIA: ICD-10-CM

## 2022-04-14 DIAGNOSIS — I47.20 VT (VENTRICULAR TACHYCARDIA): ICD-10-CM

## 2022-04-14 DIAGNOSIS — E78.2 MIXED HYPERLIPIDEMIA: ICD-10-CM

## 2022-04-14 DIAGNOSIS — I47.20 VT (VENTRICULAR TACHYCARDIA): Primary | ICD-10-CM

## 2022-04-14 DIAGNOSIS — I42.8 NONISCHEMIC CARDIOMYOPATHY: ICD-10-CM

## 2022-04-14 PROCEDURE — 99024 POSTOP FOLLOW-UP VISIT: CPT | Mod: POP,,, | Performed by: INTERNAL MEDICINE

## 2022-04-14 PROCEDURE — 99211 OFF/OP EST MAY X REQ PHY/QHP: CPT | Mod: PBBFAC,27 | Performed by: INTERNAL MEDICINE

## 2022-04-14 PROCEDURE — 93283 PRGRMG EVAL IMPLANTABLE DFB: CPT | Mod: 26,S$PBB,, | Performed by: INTERNAL MEDICINE

## 2022-04-14 PROCEDURE — 93283 PR PROGRAM EVAL (IN PERSON) IMPLANT DEVICE,CARDVERT/DEFIB,2 LEAD: ICD-10-PCS | Mod: 26,S$PBB,, | Performed by: INTERNAL MEDICINE

## 2022-04-14 PROCEDURE — 99999 PR PBB SHADOW E&M-EST. PATIENT-LVL I: ICD-10-PCS | Mod: PBBFAC,,, | Performed by: INTERNAL MEDICINE

## 2022-04-14 PROCEDURE — 99999 PR PBB SHADOW E&M-EST. PATIENT-LVL III: ICD-10-PCS | Mod: PBBFAC,,, | Performed by: INTERNAL MEDICINE

## 2022-04-14 PROCEDURE — 99999 PR PBB SHADOW E&M-EST. PATIENT-LVL I: CPT | Mod: PBBFAC,,, | Performed by: INTERNAL MEDICINE

## 2022-04-14 PROCEDURE — 99999 PR PBB SHADOW E&M-EST. PATIENT-LVL III: CPT | Mod: PBBFAC,,, | Performed by: INTERNAL MEDICINE

## 2022-04-14 PROCEDURE — 99213 OFFICE O/P EST LOW 20 MIN: CPT | Mod: PBBFAC,25 | Performed by: INTERNAL MEDICINE

## 2022-04-14 PROCEDURE — 99214 OFFICE O/P EST MOD 30 MIN: CPT | Mod: S$PBB,,, | Performed by: INTERNAL MEDICINE

## 2022-04-14 PROCEDURE — 99214 PR OFFICE/OUTPT VISIT, EST, LEVL IV, 30-39 MIN: ICD-10-PCS | Mod: S$PBB,,, | Performed by: INTERNAL MEDICINE

## 2022-04-14 PROCEDURE — 93283 PRGRMG EVAL IMPLANTABLE DFB: CPT | Mod: PBBFAC | Performed by: INTERNAL MEDICINE

## 2022-04-14 PROCEDURE — 99024 PR POST-OP FOLLOW-UP VISIT: ICD-10-PCS | Mod: POP,,, | Performed by: INTERNAL MEDICINE

## 2022-04-14 NOTE — PROGRESS NOTES
Cardiology    4/14/2022  1:28 PM    Problem list  Patient Active Problem List   Diagnosis    Automatic implantable cardioverter-defibrillator in situ    Essential hypertension    Mixed hyperlipidemia    Nonischemic cardiomyopathy    VT (ventricular tachycardia)    BMI 39.0-39.9,adult       CC:  No complaints    HPI:  He is doing well.  He is any chest pain shortness of breath.  He is doing well with his medications.  He denies any ICD discharges.  His ICD interrogation today showed episodes of SVT with a rate less than 130. There were no VT.      Medications  Current Outpatient Medications   Medication Sig Dispense Refill    ALPRAZolam (XANAX) 0.25 MG tablet Take 1 tablet (0.25 mg total) by mouth 2 (two) times daily as needed for Anxiety. 30 tablet 1    amiodarone (PACERONE) 200 MG Tab Take 2 tablets (400 mg total) by mouth once daily. 180 tablet 3    aspirin (ECOTRIN) 81 MG EC tablet Take 1 tablet (81 mg total) by mouth once. 1 Tablet, Delayed Release (E.C.) Oral Every day for 1 dose 90 tablet 3    atorvastatin (LIPITOR) 40 MG tablet Take 1 tablet (40 mg total) by mouth once daily. 90 tablet 3    carvediloL (COREG) 25 MG tablet Take 1 tablet (25 mg total) by mouth 2 (two) times a day. 180 tablet 3    digoxin (LANOXIN) 250 mcg tablet Take 1 tablet (250 mcg total) by mouth once daily. 90 tablet 3    furosemide (LASIX) 40 MG tablet Take 1 tablet (40 mg total) by mouth once daily. 1 Tablet Oral Every day 90 tablet 3    indomethacin (INDOCIN) 50 MG capsule Take 50 mg by mouth as needed.      losartan (COZAAR) 50 MG tablet Take 1 tablet (50 mg total) by mouth once daily. 90 tablet 3    meclizine (ANTIVERT) 25 mg tablet Take 1 tablet (25 mg total) by mouth 3 (three) times daily as needed for Dizziness. 30 tablet 1    tamsulosin (FLOMAX) 0.4 mg Cap Take by mouth once daily.      gabapentin (NEURONTIN) 300 MG capsule Take 300 mg by mouth nightly.      pantoprazole (PROTONIX) 40 MG tablet Take 1  tablet (40 mg total) by mouth once daily. (Patient not taking: Reported on 4/14/2022) 30 tablet 3    sildenafiL (VIAGRA) 100 MG tablet Take 1 tablet (100 mg total) by mouth daily as needed for Erectile Dysfunction. 10 tablet 3     No current facility-administered medications for this visit.      Prior to Admission medications    Medication Sig Start Date End Date Taking? Authorizing Provider   ALPRAZolam (XANAX) 0.25 MG tablet Take 1 tablet (0.25 mg total) by mouth 2 (two) times daily as needed for Anxiety. 4/6/22 5/6/22 Yes Senthil Rodríguez MD   amiodarone (PACERONE) 200 MG Tab Take 2 tablets (400 mg total) by mouth once daily. 4/6/22 4/6/23 Yes Senthil Rodríguez MD   aspirin (ECOTRIN) 81 MG EC tablet Take 1 tablet (81 mg total) by mouth once. 1 Tablet, Delayed Release (E.C.) Oral Every day for 1 dose 4/4/22 4/14/22 Yes Senthil Rodríguez MD   atorvastatin (LIPITOR) 40 MG tablet Take 1 tablet (40 mg total) by mouth once daily. 4/4/22 4/4/23 Yes Senthil Rodríguez MD   carvediloL (COREG) 25 MG tablet Take 1 tablet (25 mg total) by mouth 2 (two) times a day. 4/4/22  Yes Senthil Rodríguez MD   digoxin (LANOXIN) 250 mcg tablet Take 1 tablet (250 mcg total) by mouth once daily. 4/4/22  Yes Senthil Rodríguez MD   furosemide (LASIX) 40 MG tablet Take 1 tablet (40 mg total) by mouth once daily. 1 Tablet Oral Every day 4/4/22  Yes Senthil Rodríguez MD   indomethacin (INDOCIN) 50 MG capsule Take 50 mg by mouth as needed.   Yes Historical Provider   losartan (COZAAR) 50 MG tablet Take 1 tablet (50 mg total) by mouth once daily. 4/4/22  Yes Senthil Rodríguez MD   meclizine (ANTIVERT) 25 mg tablet Take 1 tablet (25 mg total) by mouth 3 (three) times daily as needed for Dizziness. 4/4/22  Yes Senthil Rodríguez MD   tamsulosin (FLOMAX) 0.4 mg Cap Take by mouth once daily.   Yes Historical Provider   gabapentin (NEURONTIN) 300 MG capsule Take 300 mg by mouth nightly. 12/17/21   Historical Provider   pantoprazole (PROTONIX) 40 MG tablet  Take 1 tablet (40 mg total) by mouth once daily.  Patient not taking: Reported on 4/14/2022 4/4/22   Senthil Rodríguez MD   sildenafiL (VIAGRA) 100 MG tablet Take 1 tablet (100 mg total) by mouth daily as needed for Erectile Dysfunction. 3/9/21 3/9/22  Senthil Rodríguez MD         History  No past medical history on file.  No past surgical history on file.  Social History     Socioeconomic History    Marital status:    Tobacco Use    Smoking status: Former Smoker     Types: Cigars    Smokeless tobacco: Never Used   Substance and Sexual Activity    Alcohol use: Yes         Allergies  Review of patient's allergies indicates:   Allergen Reactions    Ace inhibitors      cough    Adhesive      Skin rash         Review of Systems   Review of Systems   Constitutional: Negative for decreased appetite, fever and weight loss.   HENT: Negative for congestion and nosebleeds.    Eyes: Negative for blurred vision, double vision, vision loss in left eye, vision loss in right eye and visual disturbance.   Cardiovascular: Negative for chest pain, claudication, cyanosis, dyspnea on exertion, irregular heartbeat, leg swelling, near-syncope, orthopnea, palpitations, paroxysmal nocturnal dyspnea and syncope.   Respiratory: Negative for cough, hemoptysis, shortness of breath, sleep disturbances due to breathing, snoring, sputum production and wheezing.    Endocrine: Negative for cold intolerance and heat intolerance.   Skin: Negative for nail changes and rash.   Musculoskeletal: Negative for joint pain, muscle cramps, muscle weakness and myalgias.   Gastrointestinal: Negative for change in bowel habit, heartburn, hematemesis, hematochezia, hemorrhoids and melena.   Neurological: Negative for dizziness, focal weakness and headaches.         Physical Exam  Wt Readings from Last 1 Encounters:   04/14/22 126.2 kg (278 lb 5.3 oz)     BP Readings from Last 3 Encounters:   04/14/22 (!) 124/58   01/19/22 136/86   12/28/21 (!) 142/90      Pulse Readings from Last 1 Encounters:   04/14/22 (!) 55     Body mass index is 39.94 kg/m².    Physical Exam  Vitals reviewed.   Constitutional:       Appearance: He is well-developed.   Cardiovascular:      Rate and Rhythm: Normal rate and regular rhythm.      Pulses:           Carotid pulses are 2+ on the right side and 2+ on the left side.       Radial pulses are 2+ on the right side and 2+ on the left side.        Dorsalis pedis pulses are 2+ on the right side and 2+ on the left side.      Heart sounds: Normal heart sounds, S1 normal and S2 normal.      Comments: L side ICD.  Pulmonary:      Breath sounds: Normal breath sounds.   Abdominal:      General: Bowel sounds are normal.   Skin:     General: Skin is warm and dry.   Neurological:      Mental Status: He is alert and oriented to person, place, and time.             Assessment  1. VT (ventricular tachycardia)  Stable    2. Nonischemic cardiomyopathy  Stable  - CBC Without Differential; Future  - Comprehensive Metabolic Panel; Future  - Lipid Panel; Future  - TSH; Future    3. Mixed hyperlipidemia  Stable on statin    4. Essential hypertension  Well controlled    5. Automatic implantable cardioverter-defibrillator in situ  Stable    6. BMI 39.0-39.9,adult  Unchanged    7. Hyperlipidemia   stable  - Lipid Panel; Future    8. At risk for amiodarone toxicity with long term use   stable  - TSH; Future        Plan and Discussion  Continue current medications.    Follow Up  3-4 months with labs      Senthil Rodríguez MD, F.A.C.C, F.S.C.BASSAM.

## 2022-08-22 ENCOUNTER — LAB VISIT (OUTPATIENT)
Dept: PRIMARY CARE CLINIC | Facility: CLINIC | Age: 57
End: 2022-08-22
Payer: MEDICARE

## 2022-08-22 DIAGNOSIS — I42.8 NONISCHEMIC CARDIOMYOPATHY: ICD-10-CM

## 2022-08-22 DIAGNOSIS — E78.5 HYPERLIPIDEMIA: ICD-10-CM

## 2022-08-22 DIAGNOSIS — Z91.89 AT RISK FOR AMIODARONE TOXICITY WITH LONG TERM USE: ICD-10-CM

## 2022-08-22 DIAGNOSIS — Z79.899 AT RISK FOR AMIODARONE TOXICITY WITH LONG TERM USE: ICD-10-CM

## 2022-08-22 LAB
ALBUMIN SERPL BCP-MCNC: 3 G/DL (ref 3.5–5.2)
ALP SERPL-CCNC: 75 U/L (ref 55–135)
ALT SERPL W/O P-5'-P-CCNC: 16 U/L (ref 10–44)
ANION GAP SERPL CALC-SCNC: 9 MMOL/L (ref 8–16)
AST SERPL-CCNC: 18 U/L (ref 10–40)
BILIRUB SERPL-MCNC: 0.7 MG/DL (ref 0.1–1)
BUN SERPL-MCNC: 25 MG/DL (ref 6–20)
CALCIUM SERPL-MCNC: 8.8 MG/DL (ref 8.7–10.5)
CHLORIDE SERPL-SCNC: 105 MMOL/L (ref 95–110)
CHOLEST SERPL-MCNC: 151 MG/DL (ref 120–199)
CHOLEST/HDLC SERPL: 2.6 {RATIO} (ref 2–5)
CO2 SERPL-SCNC: 27 MMOL/L (ref 23–29)
CREAT SERPL-MCNC: 1.5 MG/DL (ref 0.5–1.4)
ERYTHROCYTE [DISTWIDTH] IN BLOOD BY AUTOMATED COUNT: 15.4 % (ref 11.5–14.5)
EST. GFR  (NO RACE VARIABLE): 54.3 ML/MIN/1.73 M^2
GLUCOSE SERPL-MCNC: 92 MG/DL (ref 70–110)
HCT VFR BLD AUTO: 41 % (ref 40–54)
HDLC SERPL-MCNC: 59 MG/DL (ref 40–75)
HDLC SERPL: 39.1 % (ref 20–50)
HGB BLD-MCNC: 13.2 G/DL (ref 14–18)
LDLC SERPL CALC-MCNC: 82.4 MG/DL (ref 63–159)
MCH RBC QN AUTO: 28.6 PG (ref 27–31)
MCHC RBC AUTO-ENTMCNC: 32.2 G/DL (ref 32–36)
MCV RBC AUTO: 89 FL (ref 82–98)
NONHDLC SERPL-MCNC: 92 MG/DL
PLATELET # BLD AUTO: 253 K/UL (ref 150–450)
PMV BLD AUTO: 10.4 FL (ref 9.2–12.9)
POTASSIUM SERPL-SCNC: 4.7 MMOL/L (ref 3.5–5.1)
PROT SERPL-MCNC: 6.9 G/DL (ref 6–8.4)
RBC # BLD AUTO: 4.61 M/UL (ref 4.6–6.2)
SODIUM SERPL-SCNC: 141 MMOL/L (ref 136–145)
TRIGL SERPL-MCNC: 48 MG/DL (ref 30–150)
TSH SERPL DL<=0.005 MIU/L-ACNC: 2.69 UIU/ML (ref 0.4–4)
WBC # BLD AUTO: 7.43 K/UL (ref 3.9–12.7)

## 2022-08-22 PROCEDURE — 36415 COLL VENOUS BLD VENIPUNCTURE: CPT | Performed by: INTERNAL MEDICINE

## 2022-08-22 PROCEDURE — 80053 COMPREHEN METABOLIC PANEL: CPT | Performed by: INTERNAL MEDICINE

## 2022-08-22 PROCEDURE — 80061 LIPID PANEL: CPT | Performed by: INTERNAL MEDICINE

## 2022-08-22 PROCEDURE — 84443 ASSAY THYROID STIM HORMONE: CPT | Performed by: INTERNAL MEDICINE

## 2022-08-22 PROCEDURE — 36415 COLL VENOUS BLD VENIPUNCTURE: CPT | Mod: PBBFAC,PN

## 2022-08-22 PROCEDURE — 85027 COMPLETE CBC AUTOMATED: CPT | Performed by: INTERNAL MEDICINE

## 2022-08-23 ENCOUNTER — OFFICE VISIT (OUTPATIENT)
Dept: CARDIOLOGY | Facility: CLINIC | Age: 57
End: 2022-08-23
Payer: MEDICARE

## 2022-08-23 VITALS
OXYGEN SATURATION: 98 % | HEIGHT: 70 IN | DIASTOLIC BLOOD PRESSURE: 72 MMHG | SYSTOLIC BLOOD PRESSURE: 124 MMHG | HEART RATE: 72 BPM | BODY MASS INDEX: 41.18 KG/M2 | WEIGHT: 287.69 LBS

## 2022-08-23 DIAGNOSIS — Z95.810 AUTOMATIC IMPLANTABLE CARDIOVERTER-DEFIBRILLATOR IN SITU: ICD-10-CM

## 2022-08-23 DIAGNOSIS — I47.20 VT (VENTRICULAR TACHYCARDIA): Primary | ICD-10-CM

## 2022-08-23 DIAGNOSIS — I42.8 NONISCHEMIC CARDIOMYOPATHY: ICD-10-CM

## 2022-08-23 DIAGNOSIS — I10 ESSENTIAL HYPERTENSION: ICD-10-CM

## 2022-08-23 DIAGNOSIS — E78.2 MIXED HYPERLIPIDEMIA: ICD-10-CM

## 2022-08-23 PROCEDURE — 99999 PR PBB SHADOW E&M-EST. PATIENT-LVL III: CPT | Mod: PBBFAC,,, | Performed by: INTERNAL MEDICINE

## 2022-08-23 PROCEDURE — 99213 OFFICE O/P EST LOW 20 MIN: CPT | Mod: PBBFAC,PN | Performed by: INTERNAL MEDICINE

## 2022-08-23 PROCEDURE — 99214 PR OFFICE/OUTPT VISIT, EST, LEVL IV, 30-39 MIN: ICD-10-PCS | Mod: S$PBB,,, | Performed by: INTERNAL MEDICINE

## 2022-08-23 PROCEDURE — 99214 OFFICE O/P EST MOD 30 MIN: CPT | Mod: S$PBB,,, | Performed by: INTERNAL MEDICINE

## 2022-08-23 PROCEDURE — 99999 PR PBB SHADOW E&M-EST. PATIENT-LVL III: ICD-10-PCS | Mod: PBBFAC,,, | Performed by: INTERNAL MEDICINE

## 2022-08-23 NOTE — PROGRESS NOTES
Cardiology    8/23/2022  9:20 AM    Problem list  Patient Active Problem List   Diagnosis    Automatic implantable cardioverter-defibrillator in situ    Essential hypertension    Mixed hyperlipidemia    Nonischemic cardiomyopathy    VT (ventricular tachycardia)    BMI 39.0-39.9,adult       CC:  SOB    HPI:  Patient reports that he would get short of breath with physical activity like walking quickly.  He was and developed cough.  He has been compliant with his medications and diet.  He is not checking his weight daily.  He denies any paroxysmal nocturnal dyspnea.    Medications  Current Outpatient Medications   Medication Sig Dispense Refill    amiodarone (PACERONE) 200 MG Tab Take 2 tablets (400 mg total) by mouth once daily. 180 tablet 3    atorvastatin (LIPITOR) 40 MG tablet Take 1 tablet (40 mg total) by mouth once daily. 90 tablet 3    carvediloL (COREG) 25 MG tablet Take 1 tablet (25 mg total) by mouth 2 (two) times a day. 180 tablet 3    digoxin (LANOXIN) 250 mcg tablet Take 1 tablet (250 mcg total) by mouth once daily. 90 tablet 3    furosemide (LASIX) 40 MG tablet Take 1 tablet (40 mg total) by mouth once daily. 1 Tablet Oral Every day 90 tablet 3    indomethacin (INDOCIN) 50 MG capsule Take 50 mg by mouth as needed.      losartan (COZAAR) 50 MG tablet Take 1 tablet (50 mg total) by mouth once daily. 90 tablet 3    meclizine (ANTIVERT) 25 mg tablet Take 1 tablet (25 mg total) by mouth 3 (three) times daily as needed for Dizziness. 30 tablet 1    tamsulosin (FLOMAX) 0.4 mg Cap Take by mouth once daily.      ALPRAZolam (XANAX) 0.25 MG tablet Take 1 tablet (0.25 mg total) by mouth 2 (two) times daily as needed for Anxiety. 30 tablet 1    aspirin (ECOTRIN) 81 MG EC tablet Take 1 tablet (81 mg total) by mouth once. 1 Tablet, Delayed Release (E.C.) Oral Every day for 1 dose 90 tablet 3    gabapentin (NEURONTIN) 300 MG capsule Take 300 mg by mouth nightly.      pantoprazole (PROTONIX) 40  MG tablet Take 1 tablet (40 mg total) by mouth once daily. (Patient not taking: No sig reported) 30 tablet 3    sildenafiL (VIAGRA) 100 MG tablet Take 1 tablet (100 mg total) by mouth daily as needed for Erectile Dysfunction. 10 tablet 3     No current facility-administered medications for this visit.      Prior to Admission medications    Medication Sig Start Date End Date Taking? Authorizing Provider   amiodarone (PACERONE) 200 MG Tab Take 2 tablets (400 mg total) by mouth once daily. 4/6/22 4/6/23 Yes Senthil Rodríguez MD   atorvastatin (LIPITOR) 40 MG tablet Take 1 tablet (40 mg total) by mouth once daily. 4/4/22 4/4/23 Yes Senthil Rodríguez MD   carvediloL (COREG) 25 MG tablet Take 1 tablet (25 mg total) by mouth 2 (two) times a day. 4/4/22  Yes Senthil Rodríguez MD   digoxin (LANOXIN) 250 mcg tablet Take 1 tablet (250 mcg total) by mouth once daily. 4/4/22  Yes Senthil Rodríguez MD   furosemide (LASIX) 40 MG tablet Take 1 tablet (40 mg total) by mouth once daily. 1 Tablet Oral Every day 4/4/22  Yes Senthil Rdoríguez MD   indomethacin (INDOCIN) 50 MG capsule Take 50 mg by mouth as needed.   Yes Historical Provider   losartan (COZAAR) 50 MG tablet Take 1 tablet (50 mg total) by mouth once daily. 4/4/22  Yes Senthil Rodríguez MD   meclizine (ANTIVERT) 25 mg tablet Take 1 tablet (25 mg total) by mouth 3 (three) times daily as needed for Dizziness. 4/4/22  Yes Senthil Rodríguez MD   tamsulosin (FLOMAX) 0.4 mg Cap Take by mouth once daily.   Yes Historical Provider   ALPRAZolam (XANAX) 0.25 MG tablet Take 1 tablet (0.25 mg total) by mouth 2 (two) times daily as needed for Anxiety. 4/6/22 5/6/22  Senthil Rodríguez MD   aspirin (ECOTRIN) 81 MG EC tablet Take 1 tablet (81 mg total) by mouth once. 1 Tablet, Delayed Release (E.C.) Oral Every day for 1 dose 4/4/22 4/14/22  Senthil Rodríguez MD   gabapentin (NEURONTIN) 300 MG capsule Take 300 mg by mouth nightly. 12/17/21   Historical Provider   pantoprazole (PROTONIX) 40 MG  tablet Take 1 tablet (40 mg total) by mouth once daily.  Patient not taking: No sig reported 4/4/22   Senthil Rodríguez MD   sildenafiL (VIAGRA) 100 MG tablet Take 1 tablet (100 mg total) by mouth daily as needed for Erectile Dysfunction. 3/9/21 3/9/22  Senthil Rodríguez MD         History  No past medical history on file.  No past surgical history on file.  Social History     Socioeconomic History    Marital status:    Tobacco Use    Smoking status: Former Smoker     Types: Cigars    Smokeless tobacco: Never Used   Substance and Sexual Activity    Alcohol use: Yes         Allergies  Review of patient's allergies indicates:   Allergen Reactions    Ace inhibitors      cough    Adhesive      Skin rash         Review of Systems   Review of Systems   Constitutional: Negative for decreased appetite, fever and weight loss.   HENT: Negative for congestion and nosebleeds.    Eyes: Negative for blurred vision, double vision, vision loss in left eye, vision loss in right eye and visual disturbance.   Cardiovascular: Positive for dyspnea on exertion. Negative for chest pain, claudication, cyanosis, irregular heartbeat, leg swelling, near-syncope, orthopnea, palpitations, paroxysmal nocturnal dyspnea and syncope.   Respiratory: Negative for cough, hemoptysis, shortness of breath, sleep disturbances due to breathing, snoring, sputum production and wheezing.    Endocrine: Negative for cold intolerance and heat intolerance.   Skin: Negative for nail changes and rash.   Musculoskeletal: Negative for joint pain, muscle cramps, muscle weakness and myalgias.   Gastrointestinal: Negative for change in bowel habit, heartburn, hematemesis, hematochezia, hemorrhoids and melena.   Neurological: Negative for dizziness, focal weakness and headaches.         Physical Exam  Wt Readings from Last 1 Encounters:   08/23/22 130.5 kg (287 lb 11.2 oz)     BP Readings from Last 3 Encounters:   08/23/22 124/72   04/14/22 (!) 124/58    01/19/22 136/86     Pulse Readings from Last 1 Encounters:   08/23/22 72     Body mass index is 41.28 kg/m².    Physical Exam  Vitals reviewed.   Constitutional:       Appearance: He is well-developed.   Cardiovascular:      Rate and Rhythm: Normal rate and regular rhythm.      Pulses:           Carotid pulses are 2+ on the right side and 2+ on the left side.       Radial pulses are 2+ on the right side and 2+ on the left side.        Dorsalis pedis pulses are 2+ on the right side and 2+ on the left side.      Heart sounds: Normal heart sounds, S1 normal and S2 normal.      Comments: L side ICD.  Pulmonary:      Breath sounds: Normal breath sounds.   Abdominal:      General: Bowel sounds are normal.   Skin:     General: Skin is warm and dry.   Neurological:      Mental Status: He is alert and oriented to person, place, and time.             Assessment  1. VT (ventricular tachycardia)  Stable, on amio    2. Nonischemic cardiomyopathy  Class II  - Echo; Future  - Comprehensive Metabolic Panel; Future    3. Mixed hyperlipidemia  stable    4. Essential hypertension  controlled    5. Automatic implantable cardioverter-defibrillator in situ  stable    6. BMI 40.0-44.9, adult  unchanged        Plan and Discussion  Check weights daily and if 5 or more lbs, take extra furosemide.  Will check echo to assess any changes in EF since he has more FELDMAN.  Get CMP to f/u cr of 1.5.  Discussed lab results.    Follow Up  2 months      Senthil Rodríguez MD, F.A.C.C, F.S.C.A.I.

## 2022-10-11 ENCOUNTER — HOSPITAL ENCOUNTER (OUTPATIENT)
Dept: CARDIOLOGY | Facility: HOSPITAL | Age: 57
Discharge: HOME | End: 2022-10-11
Attending: INTERNAL MEDICINE
Payer: MEDICARE

## 2022-10-11 VITALS
HEIGHT: 70 IN | SYSTOLIC BLOOD PRESSURE: 124 MMHG | WEIGHT: 287 LBS | HEART RATE: 72 BPM | BODY MASS INDEX: 41.09 KG/M2 | DIASTOLIC BLOOD PRESSURE: 72 MMHG

## 2022-10-11 DIAGNOSIS — I42.8 NONISCHEMIC CARDIOMYOPATHY: ICD-10-CM

## 2022-10-11 LAB
ASCENDING AORTA: 3.05 CM
AV INDEX (PROSTH): 0.5
AV MEAN GRADIENT: 4 MMHG
AV PEAK GRADIENT: 7 MMHG
AV VALVE AREA: 2.61 CM2
AV VELOCITY RATIO: 0.56
BSA FOR ECHO PROCEDURE: 2.54 M2
CV ECHO LV RWT: 0.4 CM
DOP CALC AO PEAK VEL: 1.35 M/S
DOP CALC AO VTI: 26 CM
DOP CALC LVOT AREA: 5.2 CM2
DOP CALC LVOT DIAMETER: 2.57 CM
DOP CALC LVOT PEAK VEL: 0.76 M/S
DOP CALC LVOT STROKE VOLUME: 67.92 CM3
DOP CALC MV VTI: 26.4 CM
DOP CALC RVOT PEAK VEL: 0.64 M/S
DOP CALC RVOT VTI: 15.5 CM
DOP CALCLVOT PEAK VEL VTI: 13.1 CM
E WAVE DECELERATION TIME: 206.78 MSEC
E/A RATIO: 0.65
E/E' RATIO: 18.67 M/S
ECHO LV POSTERIOR WALL: 1.38 CM (ref 0.6–1.1)
EJECTION FRACTION: 20 %
FRACTIONAL SHORTENING: 11 % (ref 28–44)
INTERVENTRICULAR SEPTUM: 1.43 CM (ref 0.6–1.1)
IVC DIAMETER: 1.66 CM
LA MAJOR: 5.97 CM
LA MINOR: 5.94 CM
LA WIDTH: 5.6 CM
LEFT ATRIUM SIZE: 4.34 CM
LEFT ATRIUM VOLUME INDEX MOD: 41.2 ML/M2
LEFT ATRIUM VOLUME INDEX: 50.6 ML/M2
LEFT ATRIUM VOLUME MOD: 100 CM3
LEFT ATRIUM VOLUME: 123.02 CM3
LEFT INTERNAL DIMENSION IN SYSTOLE: 6.19 CM (ref 2.1–4)
LEFT VENTRICLE DIASTOLIC VOLUME INDEX: 102.59 ML/M2
LEFT VENTRICLE DIASTOLIC VOLUME: 249.3 ML
LEFT VENTRICLE MASS INDEX: 203 G/M2
LEFT VENTRICLE SYSTOLIC VOLUME INDEX: 79.6 ML/M2
LEFT VENTRICLE SYSTOLIC VOLUME: 193.5 ML
LEFT VENTRICULAR INTERNAL DIMENSION IN DIASTOLE: 6.93 CM (ref 3.5–6)
LEFT VENTRICULAR MASS: 492.48 G
LV LATERAL E/E' RATIO: 18.67 M/S
LV SEPTAL E/E' RATIO: 18.67 M/S
LVOT MG: 1.17 MMHG
LVOT MV: 0.51 CM/S
MV MEAN GRADIENT: 2 MMHG
MV PEAK A VEL: 0.86 M/S
MV PEAK E VEL: 0.56 M/S
MV PEAK GRADIENT: 4 MMHG
MV STENOSIS PRESSURE HALF TIME: 59.97 MS
MV VALVE AREA BY CONTINUITY EQUATION: 2.57 CM2
MV VALVE AREA P 1/2 METHOD: 3.67 CM2
PISA MRMAX VEL: 5.12 M/S
PISA TR MAX VEL: 2.13 M/S
PULM VEIN S/D RATIO: 1.59
PV MEAN GRADIENT: 0.79 MMHG
PV MV: 0.59 M/S
PV PEAK D VEL: 0.27 M/S
PV PEAK S VEL: 0.43 M/S
PV PEAK VELOCITY: 0.89 CM/S
RA MAJOR: 4.58 CM
RA PRESSURE: 8 MMHG
RA WIDTH: 4.5 CM
SINUS: 3.9 CM
STJ: 1.96 CM
TDI LATERAL: 0.03 M/S
TDI SEPTAL: 0.03 M/S
TDI: 0.03 M/S
TR MAX PG: 18 MMHG
TRICUSPID ANNULAR PLANE SYSTOLIC EXCURSION: 2.4 CM
TV REST PULMONARY ARTERY PRESSURE: 26 MMHG

## 2022-10-11 PROCEDURE — 93306 ECHO (CUPID ONLY): ICD-10-PCS | Mod: 26,,, | Performed by: INTERNAL MEDICINE

## 2022-10-11 PROCEDURE — 93306 TTE W/DOPPLER COMPLETE: CPT | Mod: 26,,, | Performed by: INTERNAL MEDICINE

## 2022-10-11 PROCEDURE — 93306 TTE W/DOPPLER COMPLETE: CPT | Mod: PN

## 2022-10-13 ENCOUNTER — OFFICE VISIT (OUTPATIENT)
Dept: CARDIOLOGY | Facility: CLINIC | Age: 57
End: 2022-10-13
Payer: MEDICARE

## 2022-10-13 ENCOUNTER — HOSPITAL ENCOUNTER (OUTPATIENT)
Dept: RADIOLOGY | Facility: OTHER | Age: 57
Discharge: HOME | End: 2022-10-13
Attending: INTERNAL MEDICINE
Payer: MEDICARE

## 2022-10-13 VITALS
DIASTOLIC BLOOD PRESSURE: 80 MMHG | HEIGHT: 70 IN | OXYGEN SATURATION: 97 % | HEART RATE: 62 BPM | SYSTOLIC BLOOD PRESSURE: 118 MMHG | BODY MASS INDEX: 41.09 KG/M2 | WEIGHT: 287 LBS

## 2022-10-13 DIAGNOSIS — R06.02 SHORTNESS OF BREATH: ICD-10-CM

## 2022-10-13 DIAGNOSIS — I42.8 NONISCHEMIC CARDIOMYOPATHY: ICD-10-CM

## 2022-10-13 DIAGNOSIS — E78.2 MIXED HYPERLIPIDEMIA: ICD-10-CM

## 2022-10-13 DIAGNOSIS — Z95.810 AUTOMATIC IMPLANTABLE CARDIOVERTER-DEFIBRILLATOR IN SITU: Primary | ICD-10-CM

## 2022-10-13 DIAGNOSIS — Z95.810 AUTOMATIC IMPLANTABLE CARDIOVERTER-DEFIBRILLATOR IN SITU: ICD-10-CM

## 2022-10-13 DIAGNOSIS — I47.20 VT (VENTRICULAR TACHYCARDIA): Primary | ICD-10-CM

## 2022-10-13 PROCEDURE — 99999 PR PBB SHADOW E&M-EST. PATIENT-LVL III: CPT | Mod: PBBFAC,,, | Performed by: INTERNAL MEDICINE

## 2022-10-13 PROCEDURE — 99213 OFFICE O/P EST LOW 20 MIN: CPT | Mod: PBBFAC,27,25 | Performed by: INTERNAL MEDICINE

## 2022-10-13 PROCEDURE — 99999 PR PBB SHADOW E&M-EST. PATIENT-LVL I: CPT | Mod: PBBFAC,,, | Performed by: INTERNAL MEDICINE

## 2022-10-13 PROCEDURE — 93283 PR PROGRAM EVAL (IN PERSON) IMPLANT DEVICE,CARDVERT/DEFIB,2 LEAD: ICD-10-PCS | Mod: 26,S$PBB,, | Performed by: INTERNAL MEDICINE

## 2022-10-13 PROCEDURE — 71046 X-RAY EXAM CHEST 2 VIEWS: CPT | Mod: 26,,, | Performed by: RADIOLOGY

## 2022-10-13 PROCEDURE — 99024 POSTOP FOLLOW-UP VISIT: CPT | Mod: POP,,, | Performed by: INTERNAL MEDICINE

## 2022-10-13 PROCEDURE — 93283 PRGRMG EVAL IMPLANTABLE DFB: CPT | Mod: 26,S$PBB,, | Performed by: INTERNAL MEDICINE

## 2022-10-13 PROCEDURE — 99999 PR PBB SHADOW E&M-EST. PATIENT-LVL I: ICD-10-PCS | Mod: PBBFAC,,, | Performed by: INTERNAL MEDICINE

## 2022-10-13 PROCEDURE — 99999 PR PBB SHADOW E&M-EST. PATIENT-LVL III: ICD-10-PCS | Mod: PBBFAC,,, | Performed by: INTERNAL MEDICINE

## 2022-10-13 PROCEDURE — 99024 PR POST-OP FOLLOW-UP VISIT: ICD-10-PCS | Mod: POP,,, | Performed by: INTERNAL MEDICINE

## 2022-10-13 PROCEDURE — 99211 OFF/OP EST MAY X REQ PHY/QHP: CPT | Mod: PBBFAC | Performed by: INTERNAL MEDICINE

## 2022-10-13 PROCEDURE — 71046 XR CHEST PA AND LATERAL: ICD-10-PCS | Mod: 26,,, | Performed by: RADIOLOGY

## 2022-10-13 PROCEDURE — 99214 OFFICE O/P EST MOD 30 MIN: CPT | Mod: S$PBB,,, | Performed by: INTERNAL MEDICINE

## 2022-10-13 PROCEDURE — 71046 X-RAY EXAM CHEST 2 VIEWS: CPT | Mod: TC,FY

## 2022-10-13 PROCEDURE — 99214 PR OFFICE/OUTPT VISIT, EST, LEVL IV, 30-39 MIN: ICD-10-PCS | Mod: S$PBB,,, | Performed by: INTERNAL MEDICINE

## 2022-10-13 PROCEDURE — 93283 PRGRMG EVAL IMPLANTABLE DFB: CPT | Mod: PBBFAC | Performed by: INTERNAL MEDICINE

## 2022-10-13 NOTE — PROGRESS NOTES
Cardiology    10/13/2022  1:40 PM    Problem list  Patient Active Problem List   Diagnosis    Automatic implantable cardioverter-defibrillator in situ    Essential hypertension    Mixed hyperlipidemia    Nonischemic cardiomyopathy    VT (ventricular tachycardia)    BMI 39.0-39.9,adult       CC:  Follow-up    HPI:  He is here for follow-up.  He also had his device checked today.  His device showed a nonsustained VT and SVT.  He denies any worsening shortness of breath, dyspnea exertion or paroxysmal nocturnal dyspnea.  She has gained some weight since he has not walking as much.  His repeat labs show improvement his creatinine down to 1.2.    Medications  Current Outpatient Medications   Medication Sig Dispense Refill    amiodarone (PACERONE) 200 MG Tab Take 2 tablets (400 mg total) by mouth once daily. 180 tablet 3    atorvastatin (LIPITOR) 40 MG tablet Take 1 tablet (40 mg total) by mouth once daily. 90 tablet 3    carvediloL (COREG) 25 MG tablet Take 1 tablet (25 mg total) by mouth 2 (two) times a day. 180 tablet 3    digoxin (LANOXIN) 250 mcg tablet Take 1 tablet (250 mcg total) by mouth once daily. 90 tablet 3    furosemide (LASIX) 40 MG tablet Take 1 tablet (40 mg total) by mouth once daily. 1 Tablet Oral Every day 90 tablet 3    gabapentin (NEURONTIN) 300 MG capsule Take 300 mg by mouth nightly.      indomethacin (INDOCIN) 50 MG capsule Take 50 mg by mouth as needed.      losartan (COZAAR) 50 MG tablet Take 1 tablet (50 mg total) by mouth once daily. 90 tablet 3    meclizine (ANTIVERT) 25 mg tablet Take 1 tablet (25 mg total) by mouth 3 (three) times daily as needed for Dizziness. 30 tablet 1    pantoprazole (PROTONIX) 40 MG tablet Take 1 tablet (40 mg total) by mouth once daily. 30 tablet 3    tamsulosin (FLOMAX) 0.4 mg Cap Take by mouth once daily.      ALPRAZolam (XANAX) 0.25 MG tablet Take 1 tablet (0.25 mg total) by mouth 2 (two) times daily as needed for Anxiety. 30 tablet 1    aspirin (ECOTRIN)  81 MG EC tablet Take 1 tablet (81 mg total) by mouth once. 1 Tablet, Delayed Release (E.C.) Oral Every day for 1 dose 90 tablet 3    sildenafiL (VIAGRA) 100 MG tablet Take 1 tablet (100 mg total) by mouth daily as needed for Erectile Dysfunction. 10 tablet 3     No current facility-administered medications for this visit.      Prior to Admission medications    Medication Sig Start Date End Date Taking? Authorizing Provider   amiodarone (PACERONE) 200 MG Tab Take 2 tablets (400 mg total) by mouth once daily. 4/6/22 4/6/23 Yes Senthil Rodríguez MD   atorvastatin (LIPITOR) 40 MG tablet Take 1 tablet (40 mg total) by mouth once daily. 4/4/22 4/4/23 Yes Senthil Rodríguez MD   carvediloL (COREG) 25 MG tablet Take 1 tablet (25 mg total) by mouth 2 (two) times a day. 4/4/22  Yes Senthil Rodríguez MD   digoxin (LANOXIN) 250 mcg tablet Take 1 tablet (250 mcg total) by mouth once daily. 4/4/22  Yes Senthil Rodríguez MD   furosemide (LASIX) 40 MG tablet Take 1 tablet (40 mg total) by mouth once daily. 1 Tablet Oral Every day 4/4/22  Yes Senthil Rodríguez MD   gabapentin (NEURONTIN) 300 MG capsule Take 300 mg by mouth nightly. 12/17/21  Yes Historical Provider   indomethacin (INDOCIN) 50 MG capsule Take 50 mg by mouth as needed.   Yes Historical Provider   losartan (COZAAR) 50 MG tablet Take 1 tablet (50 mg total) by mouth once daily. 4/4/22  Yes Senthil Rodríguez MD   meclizine (ANTIVERT) 25 mg tablet Take 1 tablet (25 mg total) by mouth 3 (three) times daily as needed for Dizziness. 4/4/22  Yes Senthil Rodríguez MD   pantoprazole (PROTONIX) 40 MG tablet Take 1 tablet (40 mg total) by mouth once daily. 4/4/22  Yes Senthil Rodríguez MD   tamsulosin (FLOMAX) 0.4 mg Cap Take by mouth once daily.   Yes Historical Provider   ALPRAZolam (XANAX) 0.25 MG tablet Take 1 tablet (0.25 mg total) by mouth 2 (two) times daily as needed for Anxiety. 4/6/22 5/6/22  Senthil Rodríguez MD   aspirin (ECOTRIN) 81 MG EC tablet Take 1 tablet (81 mg total) by  mouth once. 1 Tablet, Delayed Release (E.C.) Oral Every day for 1 dose 4/4/22 4/14/22  Senthil Rodríguez MD   sildenafiL (VIAGRA) 100 MG tablet Take 1 tablet (100 mg total) by mouth daily as needed for Erectile Dysfunction. 3/9/21 3/9/22  Senthil Rodríguez MD         History  No past medical history on file.  No past surgical history on file.  Social History     Socioeconomic History    Marital status:    Tobacco Use    Smoking status: Former     Types: Cigars    Smokeless tobacco: Never   Substance and Sexual Activity    Alcohol use: Yes         Allergies  Review of patient's allergies indicates:   Allergen Reactions    Ace inhibitors      cough    Adhesive      Skin rash         Review of Systems   Review of Systems   Constitutional: Negative for decreased appetite, fever and weight loss.   HENT:  Negative for congestion and nosebleeds.    Eyes:  Negative for blurred vision, double vision, vision loss in left eye, vision loss in right eye and visual disturbance.   Cardiovascular:  Positive for dyspnea on exertion. Negative for chest pain, claudication, cyanosis, irregular heartbeat, leg swelling, near-syncope, orthopnea, palpitations, paroxysmal nocturnal dyspnea and syncope.   Respiratory:  Negative for cough, hemoptysis, shortness of breath, sleep disturbances due to breathing, snoring, sputum production and wheezing.    Endocrine: Negative for cold intolerance and heat intolerance.   Skin:  Negative for nail changes and rash.   Musculoskeletal:  Negative for joint pain, muscle cramps, muscle weakness and myalgias.   Gastrointestinal:  Negative for change in bowel habit, heartburn, hematemesis, hematochezia, hemorrhoids and melena.   Neurological:  Negative for dizziness, focal weakness and headaches.       Physical Exam  Wt Readings from Last 1 Encounters:   10/13/22 130.2 kg (287 lb)     BP Readings from Last 3 Encounters:   10/13/22 118/80   10/11/22 124/72   08/23/22 124/72     Pulse Readings from Last  1 Encounters:   10/13/22 62     Body mass index is 41.18 kg/m².    Physical Exam  Vitals reviewed.   Constitutional:       Appearance: He is well-developed.   Cardiovascular:      Rate and Rhythm: Normal rate and regular rhythm.      Pulses:           Carotid pulses are 2+ on the right side and 2+ on the left side.       Radial pulses are 2+ on the right side and 2+ on the left side.        Dorsalis pedis pulses are 2+ on the right side and 2+ on the left side.      Heart sounds: Normal heart sounds, S1 normal and S2 normal.      Comments: L side ICD.  Pulmonary:      Breath sounds: Normal breath sounds.   Abdominal:      General: Bowel sounds are normal.   Skin:     General: Skin is warm and dry.   Neurological:      Mental Status: He is alert and oriented to person, place, and time.           Assessment  1. VT (ventricular tachycardia)  Stable, on amiodarone    2. Nonischemic cardiomyopathy  Class II    3. Mixed hyperlipidemia  stable    4. Automatic implantable cardioverter-defibrillator in situ  stable    5. BMI 40.0-44.9, adult  unchanged    6. Shortness of breath  Being assessed  - X-Ray Chest PA And Lateral; Future        Plan and Discussion  Continue current medication.  We will follow with chest x-ray.  Reviewed his recent labs.  Discussed his recent echocardiogram with him which is unchanged.  Currently he is euvolemic.  Encourage to exercise at least 30 minutes per day, 5 days per week.      Follow Up  3-4 months      Senthil Rodríguez MD, F.A.C.C, F.S.C.A.I.

## 2023-02-07 ENCOUNTER — OFFICE VISIT (OUTPATIENT)
Dept: CARDIOLOGY | Facility: CLINIC | Age: 58
End: 2023-02-07
Payer: MEDICARE

## 2023-02-07 VITALS
OXYGEN SATURATION: 97 % | WEIGHT: 293.88 LBS | SYSTOLIC BLOOD PRESSURE: 126 MMHG | HEART RATE: 67 BPM | HEIGHT: 70 IN | DIASTOLIC BLOOD PRESSURE: 86 MMHG | BODY MASS INDEX: 42.07 KG/M2

## 2023-02-07 DIAGNOSIS — Z95.810 AUTOMATIC IMPLANTABLE CARDIOVERTER-DEFIBRILLATOR IN SITU: ICD-10-CM

## 2023-02-07 DIAGNOSIS — I47.20 VT (VENTRICULAR TACHYCARDIA): ICD-10-CM

## 2023-02-07 DIAGNOSIS — I10 ESSENTIAL HYPERTENSION: ICD-10-CM

## 2023-02-07 DIAGNOSIS — E78.2 MIXED HYPERLIPIDEMIA: ICD-10-CM

## 2023-02-07 DIAGNOSIS — I42.8 NONISCHEMIC CARDIOMYOPATHY: ICD-10-CM

## 2023-02-07 DIAGNOSIS — E66.01 MORBID OBESITY WITH BMI OF 40.0-44.9, ADULT: Primary | ICD-10-CM

## 2023-02-07 PROCEDURE — 99214 PR OFFICE/OUTPT VISIT, EST, LEVL IV, 30-39 MIN: ICD-10-PCS | Mod: S$PBB,,, | Performed by: INTERNAL MEDICINE

## 2023-02-07 PROCEDURE — 99214 OFFICE O/P EST MOD 30 MIN: CPT | Mod: S$PBB,,, | Performed by: INTERNAL MEDICINE

## 2023-02-07 PROCEDURE — 99213 OFFICE O/P EST LOW 20 MIN: CPT | Mod: PBBFAC,PN | Performed by: INTERNAL MEDICINE

## 2023-02-07 PROCEDURE — 99999 PR PBB SHADOW E&M-EST. PATIENT-LVL III: CPT | Mod: PBBFAC,,, | Performed by: INTERNAL MEDICINE

## 2023-02-07 PROCEDURE — 99999 PR PBB SHADOW E&M-EST. PATIENT-LVL III: ICD-10-PCS | Mod: PBBFAC,,, | Performed by: INTERNAL MEDICINE

## 2023-02-07 NOTE — PROGRESS NOTES
Cardiology    2/7/2023  9:18 AM    Problem list  Patient Active Problem List   Diagnosis    Automatic implantable cardioverter-defibrillator in situ    Essential hypertension    Mixed hyperlipidemia    Nonischemic cardiomyopathy    VT (ventricular tachycardia)    BMI 39.0-39.9,adult    Morbid obesity with BMI of 40.0-44.9, adult       CC:  F/u    HPI:  He has been doing well.  He went to [facility] last month because he started having numbness and pins and needle sensation starting in his lower extremity and radiating to his left arm.  He also has been having hip pain.  Because of his hip pain, he has not been walking for exercise.  He denies any angina, worsening dyspnea exertion or paroxysmal nocturnal dyspnea.  He is able to walk 20-30 minutes before SOBHe has been doing well with his medications.  He denies any fever chills.  He denies any ICD discharge.  At [facility] last month, he had echocardiogram done, results:    Summary:   1. Severely decreased left ventricular systolic function.   2. Severely dilated left ventricle.   3. Mildly dilated left atrium.   4. Segmental wall motion abnormalities as noted above.   5. Diastolic dysfunction.   6. Moderate mitral regurgitation.   7. Negative bubble study.   8. Pacer wire in the right ventricle.     Interpreting Physician: Kosta Estrella    Medications  Current Outpatient Medications   Medication Sig Dispense Refill    amiodarone (PACERONE) 200 MG Tab Take 2 tablets (400 mg total) by mouth once daily. 180 tablet 3    atorvastatin (LIPITOR) 40 MG tablet Take 1 tablet (40 mg total) by mouth once daily. 90 tablet 3    carvediloL (COREG) 25 MG tablet Take 1 tablet (25 mg total) by mouth 2 (two) times a day. 180 tablet 3    digoxin (LANOXIN) 250 mcg tablet Take 1 tablet (250 mcg total) by mouth once daily. 90 tablet 3    furosemide (LASIX) 40 MG tablet Take 1 tablet (40 mg total) by mouth once daily. 1 Tablet Oral Every day 90 tablet 3    gabapentin  (NEURONTIN) 300 MG capsule Take 300 mg by mouth nightly.      indomethacin (INDOCIN) 50 MG capsule Take 50 mg by mouth as needed.      losartan (COZAAR) 50 MG tablet Take 1 tablet (50 mg total) by mouth once daily. 90 tablet 3    meclizine (ANTIVERT) 25 mg tablet Take 1 tablet (25 mg total) by mouth 3 (three) times daily as needed for Dizziness. 30 tablet 1    tamsulosin (FLOMAX) 0.4 mg Cap Take by mouth once daily.      ALPRAZolam (XANAX) 0.25 MG tablet Take 1 tablet (0.25 mg total) by mouth 2 (two) times daily as needed for Anxiety. 30 tablet 1    aspirin (ECOTRIN) 81 MG EC tablet Take 1 tablet (81 mg total) by mouth once. 1 Tablet, Delayed Release (E.C.) Oral Every day for 1 dose 90 tablet 3    pantoprazole (PROTONIX) 40 MG tablet Take 1 tablet (40 mg total) by mouth once daily. (Patient not taking: Reported on 2/7/2023) 30 tablet 3    sildenafiL (VIAGRA) 100 MG tablet Take 1 tablet (100 mg total) by mouth daily as needed for Erectile Dysfunction. 10 tablet 3     No current facility-administered medications for this visit.      Prior to Admission medications    Medication Sig Start Date End Date Taking? Authorizing Provider   amiodarone (PACERONE) 200 MG Tab Take 2 tablets (400 mg total) by mouth once daily. 4/6/22 4/6/23 Yes Senthil Rodríguez MD   atorvastatin (LIPITOR) 40 MG tablet Take 1 tablet (40 mg total) by mouth once daily. 4/4/22 4/4/23 Yes Senthil Rodríguez MD   carvediloL (COREG) 25 MG tablet Take 1 tablet (25 mg total) by mouth 2 (two) times a day. 4/4/22  Yes Senthil Rodríguez MD   digoxin (LANOXIN) 250 mcg tablet Take 1 tablet (250 mcg total) by mouth once daily. 4/4/22  Yes Senthil Rodríguez MD   furosemide (LASIX) 40 MG tablet Take 1 tablet (40 mg total) by mouth once daily. 1 Tablet Oral Every day 4/4/22  Yes Senthil Rodríguez MD   gabapentin (NEURONTIN) 300 MG capsule Take 300 mg by mouth nightly. 12/17/21  Yes Historical Provider   indomethacin (INDOCIN) 50 MG capsule Take 50 mg by mouth as needed.    Yes Historical Provider   losartan (COZAAR) 50 MG tablet Take 1 tablet (50 mg total) by mouth once daily. 4/4/22  Yes Senthil Rodríguez MD   meclizine (ANTIVERT) 25 mg tablet Take 1 tablet (25 mg total) by mouth 3 (three) times daily as needed for Dizziness. 4/4/22  Yes Senthil Rodríguez MD   tamsulosin (FLOMAX) 0.4 mg Cap Take by mouth once daily.   Yes Historical Provider   ALPRAZolam (XANAX) 0.25 MG tablet Take 1 tablet (0.25 mg total) by mouth 2 (two) times daily as needed for Anxiety. 4/6/22 5/6/22  Senthil Rodríguez MD   aspirin (ECOTRIN) 81 MG EC tablet Take 1 tablet (81 mg total) by mouth once. 1 Tablet, Delayed Release (E.C.) Oral Every day for 1 dose 4/4/22 4/14/22  Senthil Rodríguez MD   pantoprazole (PROTONIX) 40 MG tablet Take 1 tablet (40 mg total) by mouth once daily.  Patient not taking: Reported on 2/7/2023 4/4/22   Senthil Rodríguez MD   sildenafiL (VIAGRA) 100 MG tablet Take 1 tablet (100 mg total) by mouth daily as needed for Erectile Dysfunction. 3/9/21 3/9/22  Senthil Rodríguez MD         History  No past medical history on file.  No past surgical history on file.  Social History     Socioeconomic History    Marital status:    Tobacco Use    Smoking status: Former     Types: Cigars    Smokeless tobacco: Never   Substance and Sexual Activity    Alcohol use: Yes         Allergies  Review of patient's allergies indicates:   Allergen Reactions    Ace inhibitors      cough    Adhesive      Skin rash         Review of Systems   Review of Systems   Constitutional: Negative for decreased appetite, fever and weight loss.   HENT:  Negative for congestion and nosebleeds.    Eyes:  Negative for blurred vision, double vision, vision loss in left eye, vision loss in right eye and visual disturbance.   Cardiovascular:  Positive for dyspnea on exertion. Negative for chest pain, claudication, cyanosis, irregular heartbeat, leg swelling, near-syncope, orthopnea, palpitations, paroxysmal nocturnal dyspnea and  syncope.   Respiratory:  Negative for cough, hemoptysis, shortness of breath, sleep disturbances due to breathing, snoring, sputum production and wheezing.    Endocrine: Negative for cold intolerance and heat intolerance.   Skin:  Negative for nail changes and rash.   Musculoskeletal:  Negative for joint pain, muscle cramps, muscle weakness and myalgias.   Gastrointestinal:  Negative for change in bowel habit, heartburn, hematemesis, hematochezia, hemorrhoids and melena.   Neurological:  Negative for dizziness, focal weakness and headaches.       Physical Exam  Wt Readings from Last 1 Encounters:   02/07/23 133.3 kg (293 lb 14 oz)     BP Readings from Last 3 Encounters:   02/07/23 126/86   10/13/22 118/80   10/11/22 124/72     Pulse Readings from Last 1 Encounters:   02/07/23 67     Body mass index is 42.17 kg/m².    Physical Exam  Vitals reviewed.   Constitutional:       Appearance: He is well-developed.   Cardiovascular:      Rate and Rhythm: Normal rate and regular rhythm.      Pulses:           Carotid pulses are 2+ on the right side and 2+ on the left side.       Radial pulses are 2+ on the right side and 2+ on the left side.        Dorsalis pedis pulses are 2+ on the right side and 2+ on the left side.      Heart sounds: Normal heart sounds, S1 normal and S2 normal.      Comments: L side ICD.  Pulmonary:      Breath sounds: Normal breath sounds.   Abdominal:      General: Bowel sounds are normal.   Skin:     General: Skin is warm and dry.   Neurological:      Mental Status: He is alert and oriented to person, place, and time.           Assessment  1. Morbid obesity with BMI of 40.0-44.9, adult  Unchanged    2. Nonischemic cardiomyopathy  Compensated    3. VT (ventricular tachycardia)  Stable    4. Mixed hyperlipidemia  Stable    5. Essential hypertension  Well controlled on current medications    6. Automatic implantable cardioverter-defibrillator in situ  Stable        Plan and Discussion  Continue current  medication.  Recommend to follow-up with his orthopedic doctor to address his hip pain.  Recommend stationary bike for exercise.    Follow Up  6 months      Senthil Rodríguez MD, F.A.C.C, F.S.C.A.I.        30 minutes were spent in chart review, documentation and review of results, and evaluation, treatment, and counseling of patient on the same day of service.    Disclaimer: This document was created using voice recognition software ([employer] Direct). Although it may be edited, this document may contain errors related to incorrect recognition of the spoken word. Please call the physician if clarification is needed.

## 2023-04-13 ENCOUNTER — OFFICE VISIT (OUTPATIENT)
Dept: CARDIOLOGY | Facility: CLINIC | Age: 58
End: 2023-04-13
Payer: MEDICARE

## 2023-04-13 DIAGNOSIS — Z95.810 AUTOMATIC IMPLANTABLE CARDIOVERTER-DEFIBRILLATOR IN SITU: Primary | ICD-10-CM

## 2023-04-13 PROCEDURE — 93283 PR PROGRAM EVAL (IN PERSON) IMPLANT DEVICE,CARDVERT/DEFIB,2 LEAD: ICD-10-PCS | Mod: 26,S$PBB,, | Performed by: INTERNAL MEDICINE

## 2023-04-13 PROCEDURE — 93283 PRGRMG EVAL IMPLANTABLE DFB: CPT | Mod: 26,S$PBB,, | Performed by: INTERNAL MEDICINE

## 2023-04-13 PROCEDURE — 99024 PR POST-OP FOLLOW-UP VISIT: ICD-10-PCS | Mod: POP,,, | Performed by: INTERNAL MEDICINE

## 2023-04-13 PROCEDURE — 99999 PR PBB SHADOW E&M-EST. PATIENT-LVL I: ICD-10-PCS | Mod: PBBFAC,,, | Performed by: INTERNAL MEDICINE

## 2023-04-13 PROCEDURE — 99999 PR PBB SHADOW E&M-EST. PATIENT-LVL I: CPT | Mod: PBBFAC,,, | Performed by: INTERNAL MEDICINE

## 2023-04-13 PROCEDURE — 99024 POSTOP FOLLOW-UP VISIT: CPT | Mod: POP,,, | Performed by: INTERNAL MEDICINE

## 2023-04-13 PROCEDURE — 99211 OFF/OP EST MAY X REQ PHY/QHP: CPT | Mod: PBBFAC | Performed by: INTERNAL MEDICINE

## 2023-04-13 PROCEDURE — 93283 PRGRMG EVAL IMPLANTABLE DFB: CPT | Mod: PBBFAC | Performed by: INTERNAL MEDICINE

## 2023-06-01 DIAGNOSIS — I42.8 NONISCHEMIC CARDIOMYOPATHY: ICD-10-CM

## 2023-06-01 DIAGNOSIS — I47.20 VT (VENTRICULAR TACHYCARDIA): ICD-10-CM

## 2023-06-01 DIAGNOSIS — E78.2 MIXED HYPERLIPIDEMIA: ICD-10-CM

## 2023-06-01 RX ORDER — LOSARTAN POTASSIUM 50 MG/1
TABLET ORAL
Qty: 90 TABLET | Refills: 3 | Status: SHIPPED | OUTPATIENT
Start: 2023-06-01 | End: 2024-01-08 | Stop reason: SDUPTHER

## 2023-06-01 RX ORDER — ATORVASTATIN CALCIUM 40 MG/1
TABLET, FILM COATED ORAL
Qty: 90 TABLET | Refills: 3 | Status: SHIPPED | OUTPATIENT
Start: 2023-06-01 | End: 2024-01-08 | Stop reason: SDUPTHER

## 2023-06-01 RX ORDER — AMIODARONE HYDROCHLORIDE 200 MG/1
400 TABLET ORAL DAILY
Qty: 180 TABLET | Refills: 3 | Status: SHIPPED | OUTPATIENT
Start: 2023-06-01 | End: 2024-01-08 | Stop reason: SDUPTHER

## 2023-08-09 ENCOUNTER — OFFICE VISIT (OUTPATIENT)
Dept: CARDIOLOGY | Facility: CLINIC | Age: 58
End: 2023-08-09
Payer: COMMERCIAL

## 2023-08-09 VITALS
BODY MASS INDEX: 40.58 KG/M2 | HEART RATE: 70 BPM | OXYGEN SATURATION: 96 % | DIASTOLIC BLOOD PRESSURE: 82 MMHG | WEIGHT: 282.88 LBS | SYSTOLIC BLOOD PRESSURE: 120 MMHG

## 2023-08-09 DIAGNOSIS — I42.8 NONISCHEMIC CARDIOMYOPATHY: ICD-10-CM

## 2023-08-09 DIAGNOSIS — Z95.810 AUTOMATIC IMPLANTABLE CARDIOVERTER-DEFIBRILLATOR IN SITU: ICD-10-CM

## 2023-08-09 DIAGNOSIS — E78.2 MIXED HYPERLIPIDEMIA: ICD-10-CM

## 2023-08-09 DIAGNOSIS — I47.20 VT (VENTRICULAR TACHYCARDIA): Primary | ICD-10-CM

## 2023-08-09 DIAGNOSIS — I10 ESSENTIAL HYPERTENSION: ICD-10-CM

## 2023-08-09 DIAGNOSIS — E66.01 MORBID OBESITY WITH BMI OF 40.0-44.9, ADULT: ICD-10-CM

## 2023-08-09 PROCEDURE — 99213 OFFICE O/P EST LOW 20 MIN: CPT | Mod: PBBFAC,PN | Performed by: INTERNAL MEDICINE

## 2023-08-09 PROCEDURE — 93000 EKG 12-LEAD: ICD-10-PCS | Mod: S$GLB,,, | Performed by: INTERNAL MEDICINE

## 2023-08-09 PROCEDURE — 99214 OFFICE O/P EST MOD 30 MIN: CPT | Mod: 25,S$GLB,, | Performed by: INTERNAL MEDICINE

## 2023-08-09 PROCEDURE — 93000 ELECTROCARDIOGRAM COMPLETE: CPT | Mod: S$GLB,,, | Performed by: INTERNAL MEDICINE

## 2023-08-09 PROCEDURE — 99214 PR OFFICE/OUTPT VISIT, EST, LEVL IV, 30-39 MIN: ICD-10-PCS | Mod: 25,S$GLB,, | Performed by: INTERNAL MEDICINE

## 2023-08-09 PROCEDURE — 99999 PR PBB SHADOW E&M-EST. PATIENT-LVL III: ICD-10-PCS | Mod: PBBFAC,,, | Performed by: INTERNAL MEDICINE

## 2023-08-09 PROCEDURE — 99999 PR PBB SHADOW E&M-EST. PATIENT-LVL III: CPT | Mod: PBBFAC,,, | Performed by: INTERNAL MEDICINE

## 2023-08-09 PROCEDURE — 93005 ELECTROCARDIOGRAM TRACING: CPT | Mod: PBBFAC,PN | Performed by: INTERNAL MEDICINE

## 2023-08-09 NOTE — PROGRESS NOTES
Cardiology    8/9/2023  9:48 AM    Problem list  Patient Active Problem List   Diagnosis    Automatic implantable cardioverter-defibrillator in situ    Essential hypertension    Mixed hyperlipidemia    Nonischemic cardiomyopathy    VT (ventricular tachycardia)    BMI 39.0-39.9,adult    Morbid obesity with BMI of 40.0-44.9, adult       CC:  F/u    HPI:  He denies any chest pain, paroxysmal nocturnal dyspnea, orthopnea.  He denies any swelling.  He denies any ICD discharge.  He denies any fever chills.  He is compliant with medications.  He reports occasional wheezing in his upper airway.    Medications  Current Outpatient Medications   Medication Sig Dispense Refill    amiodarone (PACERONE) 200 MG Tab Take 2 tablets (400 mg total) by mouth once daily. 180 tablet 3    atorvastatin (LIPITOR) 40 MG tablet Take one tablet by mouth daily 90 tablet 3    carvediloL (COREG) 25 MG tablet Take 1 tablet (25 mg total) by mouth 2 (two) times a day. 180 tablet 3    digoxin (LANOXIN) 250 mcg tablet Take 1 tablet (250 mcg total) by mouth once daily. 90 tablet 3    furosemide (LASIX) 40 MG tablet Take 1 tablet (40 mg total) by mouth once daily. 1 Tablet Oral Every day 90 tablet 3    gabapentin (NEURONTIN) 300 MG capsule Take 300 mg by mouth nightly.      losartan (COZAAR) 50 MG tablet Take one tablet by mouth daily 90 tablet 3    meclizine (ANTIVERT) 25 mg tablet Take 1 tablet (25 mg total) by mouth 3 (three) times daily as needed for Dizziness. 30 tablet 1    tamsulosin (FLOMAX) 0.4 mg Cap Take by mouth once daily.      ALPRAZolam (XANAX) 0.25 MG tablet Take 1 tablet (0.25 mg total) by mouth 2 (two) times daily as needed for Anxiety. 30 tablet 1    aspirin (ECOTRIN) 81 MG EC tablet Take 1 tablet (81 mg total) by mouth once. 1 Tablet, Delayed Release (E.C.) Oral Every day for 1 dose 90 tablet 3    sildenafiL (VIAGRA) 100 MG tablet Take 1 tablet (100 mg total) by mouth daily as needed for Erectile Dysfunction. 10 tablet 3      No current facility-administered medications for this visit.      Prior to Admission medications    Medication Sig Start Date End Date Taking? Authorizing Provider   amiodarone (PACERONE) 200 MG Tab Take 2 tablets (400 mg total) by mouth once daily. 6/1/23 5/31/24 Yes Senthil Rodríguez MD   atorvastatin (LIPITOR) 40 MG tablet Take one tablet by mouth daily 6/1/23  Yes Senthil Rodríguez MD   carvediloL (COREG) 25 MG tablet Take 1 tablet (25 mg total) by mouth 2 (two) times a day. 4/4/22  Yes Senthil Rodríguez MD   digoxin (LANOXIN) 250 mcg tablet Take 1 tablet (250 mcg total) by mouth once daily. 4/4/22  Yes Senthil Rodríguez MD   furosemide (LASIX) 40 MG tablet Take 1 tablet (40 mg total) by mouth once daily. 1 Tablet Oral Every day 4/4/22  Yes Senthil Rodríguez MD   gabapentin (NEURONTIN) 300 MG capsule Take 300 mg by mouth nightly. 12/17/21  Yes Provider, Historical   losartan (COZAAR) 50 MG tablet Take one tablet by mouth daily 6/1/23  Yes Senthil Rodríguez MD   meclizine (ANTIVERT) 25 mg tablet Take 1 tablet (25 mg total) by mouth 3 (three) times daily as needed for Dizziness. 4/4/22  Yes Senthil Rodríguez MD   tamsulosin (FLOMAX) 0.4 mg Cap Take by mouth once daily.   Yes Provider, Historical   ALPRAZolam (XANAX) 0.25 MG tablet Take 1 tablet (0.25 mg total) by mouth 2 (two) times daily as needed for Anxiety. 4/6/22 5/6/22  Senthil Rodríguez MD   aspirin (ECOTRIN) 81 MG EC tablet Take 1 tablet (81 mg total) by mouth once. 1 Tablet, Delayed Release (E.C.) Oral Every day for 1 dose 4/4/22 4/14/22  Senthil Rodríguez MD   sildenafiL (VIAGRA) 100 MG tablet Take 1 tablet (100 mg total) by mouth daily as needed for Erectile Dysfunction. 3/9/21 3/9/22  Senthil Rodríguez MD   indomethacin (INDOCIN) 50 MG capsule Take 50 mg by mouth as needed.  8/9/23  Provider, Historical   pantoprazole (PROTONIX) 40 MG tablet Take 1 tablet (40 mg total) by mouth once daily. 4/4/22 8/9/23  Senthil Rodríguez MD         History  No  past medical history on file.  No past surgical history on file.  Social History     Socioeconomic History    Marital status:    Tobacco Use    Smoking status: Former     Current packs/day: 0.00     Types: Cigars    Smokeless tobacco: Never   Substance and Sexual Activity    Alcohol use: Yes         Allergies  Review of patient's allergies indicates:   Allergen Reactions    Ace inhibitors      cough    Adhesive      Skin rash         Review of Systems   Review of Systems   Constitutional: Negative for decreased appetite, fever and weight loss.   HENT:  Negative for congestion and nosebleeds.    Eyes:  Negative for blurred vision, double vision, vision loss in left eye, vision loss in right eye and visual disturbance.   Cardiovascular:  Negative for chest pain, claudication, cyanosis, dyspnea on exertion, irregular heartbeat, leg swelling, near-syncope, orthopnea, palpitations, paroxysmal nocturnal dyspnea and syncope.   Respiratory:  Negative for cough, hemoptysis, shortness of breath, sleep disturbances due to breathing, snoring, sputum production and wheezing.    Endocrine: Negative for cold intolerance and heat intolerance.   Skin:  Negative for nail changes and rash.   Musculoskeletal:  Negative for joint pain, muscle cramps, muscle weakness and myalgias.   Gastrointestinal:  Negative for change in bowel habit, heartburn, hematemesis, hematochezia, hemorrhoids and melena.   Neurological:  Negative for dizziness, focal weakness and headaches.         Physical Exam  Wt Readings from Last 1 Encounters:   08/09/23 128.3 kg (282 lb 13.6 oz)     BP Readings from Last 3 Encounters:   08/09/23 120/82   02/07/23 126/86   10/13/22 118/80     Pulse Readings from Last 1 Encounters:   08/09/23 70     Body mass index is 40.58 kg/m².    Physical Exam  Vitals reviewed.   Constitutional:       Appearance: He is well-developed.   Cardiovascular:      Rate and Rhythm: Normal rate and regular rhythm.      Pulses:            Carotid pulses are 2+ on the right side and 2+ on the left side.       Radial pulses are 2+ on the right side and 2+ on the left side.        Dorsalis pedis pulses are 2+ on the right side and 2+ on the left side.      Heart sounds: Normal heart sounds, S1 normal and S2 normal.      Comments: L side ICD.  Pulmonary:      Breath sounds: Normal breath sounds.   Abdominal:      General: Bowel sounds are normal.   Skin:     General: Skin is warm and dry.   Neurological:      Mental Status: He is alert and oriented to person, place, and time.             Assessment  1. VT (ventricular tachycardia)  Stable    2. Nonischemic cardiomyopathy  Stable    3. Mixed hyperlipidemia  Stable    4. Essential hypertension  Well controlled on current medications    5. Automatic implantable cardioverter-defibrillator in situ  Stable    6. Morbid obesity with BMI of 40.0-44.9, adult  Unchanged        Plan and Discussion  Continue with current medications, will need arrangement for ICD interrogation.    Follow Up  6 months      Senthil Rodríguez MD, F.A.C.C, F.S.C.A.I.        30 minutes were spent in chart review, documentation and review of results, and evaluation, treatment, and counseling of patient on the same day of service.    Disclaimer: This document was created using voice recognition software ([employer]). Although it may be edited, this document may contain errors related to incorrect recognition of the spoken word. Please call the physician if clarification is needed.

## 2023-11-09 ENCOUNTER — TELEPHONE (OUTPATIENT)
Dept: CARDIOLOGY | Facility: CLINIC | Age: 58
End: 2023-11-09
Payer: COMMERCIAL

## 2023-11-09 NOTE — TELEPHONE ENCOUNTER
----- Message from Paulina Recinos sent at 11/9/2023  9:28 AM CST -----  Name of Who is Calling: JEWELS WALLACE [[id number]]              What is the request in detail: Patient requesting a call back to discuss rescheduling device check and appt              Can the clinic reply by [facility]: No              What Number to Call Back if not in [facility]: [phone]

## 2023-11-09 NOTE — TELEPHONE ENCOUNTER
LM for pt to rescheduled appt today to 11/27/23 at 1:40 pm. Instructed pt to call the office back if new date and/or time is not convenient.

## 2023-11-27 ENCOUNTER — OFFICE VISIT (OUTPATIENT)
Dept: CARDIOLOGY | Facility: CLINIC | Age: 58
End: 2023-11-27
Payer: COMMERCIAL

## 2023-11-27 VITALS
HEART RATE: 55 BPM | BODY MASS INDEX: 41.52 KG/M2 | OXYGEN SATURATION: 98 % | WEIGHT: 289.38 LBS | DIASTOLIC BLOOD PRESSURE: 68 MMHG | SYSTOLIC BLOOD PRESSURE: 112 MMHG

## 2023-11-27 DIAGNOSIS — E78.2 MIXED HYPERLIPIDEMIA: ICD-10-CM

## 2023-11-27 DIAGNOSIS — Z95.810 AUTOMATIC IMPLANTABLE CARDIOVERTER-DEFIBRILLATOR IN SITU: Primary | ICD-10-CM

## 2023-11-27 DIAGNOSIS — I48.0 PAF (PAROXYSMAL ATRIAL FIBRILLATION): ICD-10-CM

## 2023-11-27 DIAGNOSIS — I10 ESSENTIAL HYPERTENSION: ICD-10-CM

## 2023-11-27 DIAGNOSIS — M54.9 BACK PAIN, UNSPECIFIED BACK LOCATION, UNSPECIFIED BACK PAIN LATERALITY, UNSPECIFIED CHRONICITY: ICD-10-CM

## 2023-11-27 DIAGNOSIS — I42.8 NONISCHEMIC CARDIOMYOPATHY: ICD-10-CM

## 2023-11-27 DIAGNOSIS — E66.01 MORBID OBESITY WITH BMI OF 40.0-44.9, ADULT: ICD-10-CM

## 2023-11-27 PROCEDURE — 99214 OFFICE O/P EST MOD 30 MIN: CPT | Mod: S$GLB,,, | Performed by: INTERNAL MEDICINE

## 2023-11-27 PROCEDURE — 99999 PR PBB SHADOW E&M-EST. PATIENT-LVL III: ICD-10-PCS | Mod: PBBFAC,,, | Performed by: INTERNAL MEDICINE

## 2023-11-27 PROCEDURE — 99213 OFFICE O/P EST LOW 20 MIN: CPT | Mod: PBBFAC | Performed by: INTERNAL MEDICINE

## 2023-11-27 PROCEDURE — 99214 PR OFFICE/OUTPT VISIT, EST, LEVL IV, 30-39 MIN: ICD-10-PCS | Mod: S$GLB,,, | Performed by: INTERNAL MEDICINE

## 2023-11-27 PROCEDURE — 99024 POSTOP FOLLOW-UP VISIT: CPT | Mod: S$GLB,,, | Performed by: INTERNAL MEDICINE

## 2023-11-27 PROCEDURE — 93283 PR PROGRAM EVAL (IN PERSON) IMPLANT DEVICE,CARDVERT/DEFIB,2 LEAD: ICD-10-PCS | Mod: S$GLB,,, | Performed by: INTERNAL MEDICINE

## 2023-11-27 PROCEDURE — 99024 PR POST-OP FOLLOW-UP VISIT: ICD-10-PCS | Mod: S$GLB,,, | Performed by: INTERNAL MEDICINE

## 2023-11-27 PROCEDURE — 93283 PRGRMG EVAL IMPLANTABLE DFB: CPT | Mod: S$GLB,,, | Performed by: INTERNAL MEDICINE

## 2023-11-27 PROCEDURE — 93283 PRGRMG EVAL IMPLANTABLE DFB: CPT | Mod: PBBFAC | Performed by: INTERNAL MEDICINE

## 2023-11-27 PROCEDURE — 99999 PR PBB SHADOW E&M-EST. PATIENT-LVL III: CPT | Mod: PBBFAC,,, | Performed by: INTERNAL MEDICINE

## 2023-11-27 NOTE — PROGRESS NOTES
Cardiology    11/27/2023  2:04 PM    Problem list  Patient Active Problem List   Diagnosis    Automatic implantable cardioverter-defibrillator in situ    Essential hypertension    Mixed hyperlipidemia    Nonischemic cardiomyopathy    VT (ventricular tachycardia)    BMI 39.0-39.9,adult    Morbid obesity with BMI of 40.0-44.9, adult       CC:  SOB, back pain    HPI:  Patient is here for follow-up.  He complains of fullness in his chest when he over exerts himself.  He noticed he has gained a few lb.  He has not taken an additional Lasix.  He also complains of back pain and numbness down his legs.  He has not been evaluated for this.  He denies any ICD discharge.    Medications  Current Outpatient Medications   Medication Sig Dispense Refill    amiodarone (PACERONE) 200 MG Tab Take 2 tablets (400 mg total) by mouth once daily. 180 tablet 3    atorvastatin (LIPITOR) 40 MG tablet Take one tablet by mouth daily 90 tablet 3    carvediloL (COREG) 25 MG tablet Take 1 tablet (25 mg total) by mouth 2 (two) times a day. 180 tablet 3    digoxin (LANOXIN) 250 mcg tablet Take 1 tablet (250 mcg total) by mouth once daily. 90 tablet 3    furosemide (LASIX) 40 MG tablet Take 1 tablet (40 mg total) by mouth once daily. 1 Tablet Oral Every day 90 tablet 3    losartan (COZAAR) 50 MG tablet Take one tablet by mouth daily 90 tablet 3    meclizine (ANTIVERT) 25 mg tablet Take 1 tablet (25 mg total) by mouth 3 (three) times daily as needed for Dizziness. 30 tablet 1    tamsulosin (FLOMAX) 0.4 mg Cap Take by mouth once daily.      ALPRAZolam (XANAX) 0.25 MG tablet Take 1 tablet (0.25 mg total) by mouth 2 (two) times daily as needed for Anxiety. 30 tablet 1    aspirin (ECOTRIN) 81 MG EC tablet Take 1 tablet (81 mg total) by mouth once. 1 Tablet, Delayed Release (E.C.) Oral Every day for 1 dose 90 tablet 3    empagliflozin (JARDIANCE) 10 mg tablet Take 1 tablet (10 mg total) by mouth once daily. 30 tablet 11    gabapentin (NEURONTIN)  300 MG capsule Take 300 mg by mouth nightly.      sildenafiL (VIAGRA) 100 MG tablet Take 1 tablet (100 mg total) by mouth daily as needed for Erectile Dysfunction. 10 tablet 3     No current facility-administered medications for this visit.      Prior to Admission medications    Medication Sig Start Date End Date Taking? Authorizing Provider   amiodarone (PACERONE) 200 MG Tab Take 2 tablets (400 mg total) by mouth once daily. 6/1/23 5/31/24 Yes Senthil Rodríguez MD   atorvastatin (LIPITOR) 40 MG tablet Take one tablet by mouth daily 6/1/23  Yes Senthil Rodríguez MD   carvediloL (COREG) 25 MG tablet Take 1 tablet (25 mg total) by mouth 2 (two) times a day. 4/4/22  Yes Senthil Rodríguez MD   digoxin (LANOXIN) 250 mcg tablet Take 1 tablet (250 mcg total) by mouth once daily. 4/4/22  Yes Senhtil Rodríguez MD   furosemide (LASIX) 40 MG tablet Take 1 tablet (40 mg total) by mouth once daily. 1 Tablet Oral Every day 4/4/22  Yes Senthil Rodríguez MD   losartan (COZAAR) 50 MG tablet Take one tablet by mouth daily 6/1/23  Yes Senthil Rodríguez MD   meclizine (ANTIVERT) 25 mg tablet Take 1 tablet (25 mg total) by mouth 3 (three) times daily as needed for Dizziness. 4/4/22  Yes Senthil Rodríguez MD   tamsulosin (FLOMAX) 0.4 mg Cap Take by mouth once daily.   Yes Provider, Historical   ALPRAZolam (XANAX) 0.25 MG tablet Take 1 tablet (0.25 mg total) by mouth 2 (two) times daily as needed for Anxiety. 4/6/22 5/6/22  Senthil Rodríguez MD   aspirin (ECOTRIN) 81 MG EC tablet Take 1 tablet (81 mg total) by mouth once. 1 Tablet, Delayed Release (E.C.) Oral Every day for 1 dose 4/4/22 4/14/22  Senthil Rodríguez MD   empagliflozin (JARDIANCE) 10 mg tablet Take 1 tablet (10 mg total) by mouth once daily. 11/27/23   Senthil Rodríguez MD   gabapentin (NEURONTIN) 300 MG capsule Take 300 mg by mouth nightly. 12/17/21   Provider, Historical   sildenafiL (VIAGRA) 100 MG tablet Take 1 tablet (100 mg total) by mouth daily as needed for Erectile  Dysfunction. 3/9/21 3/9/22  Senthil Rodríguez MD         History  No past medical history on file.  No past surgical history on file.  Social History     Socioeconomic History    Marital status:    Tobacco Use    Smoking status: Former     Types: Cigars    Smokeless tobacco: Never   Substance and Sexual Activity    Alcohol use: Yes         Allergies  Review of patient's allergies indicates:   Allergen Reactions    Ace inhibitors      cough    Adhesive      Skin rash         Review of Systems   Review of Systems   Constitutional: Negative for decreased appetite, fever and weight loss.   HENT:  Negative for congestion and nosebleeds.    Eyes:  Negative for blurred vision, double vision, vision loss in left eye, vision loss in right eye and visual disturbance.   Cardiovascular:  Negative for chest pain, claudication, cyanosis, dyspnea on exertion, irregular heartbeat, leg swelling, near-syncope, orthopnea, palpitations, paroxysmal nocturnal dyspnea and syncope.   Respiratory:  Negative for cough, hemoptysis, shortness of breath, sleep disturbances due to breathing, snoring, sputum production and wheezing.    Endocrine: Negative for cold intolerance and heat intolerance.   Skin:  Negative for nail changes and rash.   Musculoskeletal:  Negative for joint pain, muscle cramps, muscle weakness and myalgias.   Gastrointestinal:  Negative for change in bowel habit, heartburn, hematemesis, hematochezia, hemorrhoids and melena.   Neurological:  Negative for dizziness, focal weakness and headaches.         Physical Exam  Wt Readings from Last 1 Encounters:   11/27/23 131.3 kg (289 lb 6.4 oz)     BP Readings from Last 3 Encounters:   11/27/23 112/68   08/09/23 120/82   02/07/23 126/86     Pulse Readings from Last 1 Encounters:   11/27/23 (!) 55     Body mass index is 41.52 kg/m².    Physical Exam  Vitals reviewed.   Constitutional:       Appearance: He is well-developed.   Cardiovascular:      Rate and Rhythm: Normal rate  and regular rhythm.      Pulses:           Carotid pulses are 2+ on the right side and 2+ on the left side.       Radial pulses are 2+ on the right side and 2+ on the left side.        Dorsalis pedis pulses are 2+ on the right side and 2+ on the left side.      Heart sounds: Normal heart sounds, S1 normal and S2 normal.      Comments: L side ICD.  Pulmonary:      Breath sounds: Normal breath sounds.   Abdominal:      General: Bowel sounds are normal.   Skin:     General: Skin is warm and dry.   Neurological:      Mental Status: He is alert and oriented to person, place, and time.             Assessment  1. Automatic implantable cardioverter-defibrillator in situ  Stable    2. Essential hypertension  Well controlled on current medications    3. Mixed hyperlipidemia  Stable    4. Nonischemic cardiomyopathy  Class 2  - Comprehensive Metabolic Panel; Future  - Digoxin Level; Future    5. Morbid obesity with BMI of 40.0-44.9, adult  Unchanged    6. Back pain, unspecified back location, unspecified back pain laterality, unspecified chronicity  Being referred  - Ambulatory referral/consult to [facility]; Future        Plan and Discussion  Will refer to back and spine Clinic to evaluate his back pain.  Encouraged to weigh himself daily and take an extra furosemide if he is game more than 5 lb.  Recommend to follow low-sodium diet.  Will start Jardiance 10 mg once a day.  Will get labs in 2 weeks including CMP and digoxin level.    Follow Up  3 months      Senthil Rodríguez MD, F.A.C.C, F.S.C.A.I.    Addendum:  device interrogation showed PAF.  [id number] (CHF and HTN).  Will start Eliquis 5mg BID.    30 minutes were spent in chart review, documentation and review of results, and evaluation, treatment, and counseling of patient on the same day of service.    Disclaimer: This document was created using voice recognition software ([email or web address]). Although it may be edited, this document may contain errors  related to incorrect recognition of the spoken word. Please call the physician if clarification is needed.

## 2023-11-29 ENCOUNTER — OFFICE VISIT (OUTPATIENT)
Dept: SPINE | Facility: CLINIC | Age: 58
End: 2023-11-29
Payer: COMMERCIAL

## 2023-11-29 ENCOUNTER — HOSPITAL ENCOUNTER (OUTPATIENT)
Dept: RADIOLOGY | Facility: OTHER | Age: 58
Discharge: HOME | End: 2023-11-29
Attending: STUDENT IN AN ORGANIZED HEALTH CARE EDUCATION/TRAINING PROGRAM
Payer: COMMERCIAL

## 2023-11-29 VITALS
OXYGEN SATURATION: 100 % | BODY MASS INDEX: 41.44 KG/M2 | DIASTOLIC BLOOD PRESSURE: 80 MMHG | SYSTOLIC BLOOD PRESSURE: 142 MMHG | HEIGHT: 70 IN | WEIGHT: 289.44 LBS | HEART RATE: 83 BPM | RESPIRATION RATE: 18 BRPM

## 2023-11-29 DIAGNOSIS — M51.36 DDD (DEGENERATIVE DISC DISEASE), LUMBAR: ICD-10-CM

## 2023-11-29 DIAGNOSIS — I48.0 PAF (PAROXYSMAL ATRIAL FIBRILLATION): ICD-10-CM

## 2023-11-29 DIAGNOSIS — M54.9 BACK PAIN, UNSPECIFIED BACK LOCATION, UNSPECIFIED BACK PAIN LATERALITY, UNSPECIFIED CHRONICITY: ICD-10-CM

## 2023-11-29 DIAGNOSIS — M54.16 LUMBAR RADICULOPATHY: ICD-10-CM

## 2023-11-29 DIAGNOSIS — M54.16 LUMBAR RADICULOPATHY: Primary | ICD-10-CM

## 2023-11-29 PROCEDURE — 99999 PR PBB SHADOW E&M-EST. PATIENT-LVL V: ICD-10-PCS | Mod: PBBFAC,,, | Performed by: STUDENT IN AN ORGANIZED HEALTH CARE EDUCATION/TRAINING PROGRAM

## 2023-11-29 PROCEDURE — 72114 X-RAY EXAM L-S SPINE BENDING: CPT | Mod: TC,FY

## 2023-11-29 PROCEDURE — 99204 OFFICE O/P NEW MOD 45 MIN: CPT | Mod: S$GLB,,, | Performed by: STUDENT IN AN ORGANIZED HEALTH CARE EDUCATION/TRAINING PROGRAM

## 2023-11-29 PROCEDURE — 99204 PR OFFICE/OUTPT VISIT, NEW, LEVL IV, 45-59 MIN: ICD-10-PCS | Mod: S$GLB,,, | Performed by: STUDENT IN AN ORGANIZED HEALTH CARE EDUCATION/TRAINING PROGRAM

## 2023-11-29 PROCEDURE — 99215 OFFICE O/P EST HI 40 MIN: CPT | Mod: PBBFAC | Performed by: STUDENT IN AN ORGANIZED HEALTH CARE EDUCATION/TRAINING PROGRAM

## 2023-11-29 PROCEDURE — 99999 PR PBB SHADOW E&M-EST. PATIENT-LVL V: CPT | Mod: PBBFAC,,, | Performed by: STUDENT IN AN ORGANIZED HEALTH CARE EDUCATION/TRAINING PROGRAM

## 2023-11-29 PROCEDURE — 72114 XR LUMBAR SPINE 5 VIEW WITH FLEX AND EXT: ICD-10-PCS | Mod: 26,,, | Performed by: RADIOLOGY

## 2023-11-29 PROCEDURE — 72114 X-RAY EXAM L-S SPINE BENDING: CPT | Mod: 26,,, | Performed by: RADIOLOGY

## 2023-11-29 RX ORDER — GABAPENTIN 300 MG/1
300 CAPSULE ORAL 3 TIMES DAILY
Qty: 30 CAPSULE | Refills: 2 | Status: SHIPPED | OUTPATIENT
Start: 2023-11-29 | End: 2023-12-13 | Stop reason: SDUPTHER

## 2023-11-29 NOTE — TELEPHONE ENCOUNTER
----- Message from Christine Lamar sent at 11/29/2023  2:08 PM CST -----  Regarding: call back  Name of caller: Franco       What is the requesting detail: pt is requesting a call back from Emilee.     Pt state his pharmacy closed and his medication apixaban (ELIQUIS) 5 mg Tab, and empagliflozin (JARDIANCE) 10 mg table need to be sent to     [facility] [address] [place], [place] - [facility] Sebastian Diehl. Please advise       Can the clinic reply by [facility]:       What number to call back: [phone]

## 2023-11-29 NOTE — PROGRESS NOTES
Chronic Pain - New Consult    Referring Physician: Senthil Rodríguez MD    Chief Complaint:   Chief Complaint   Patient presents with    Hip Pain    Back Pain    Leg Pain        SUBJECTIVE:    Franco Cooper presents to the clinic for the evaluation of back and bilateral leg pain. The pain started several years ago following no inciting event and symptoms have been worsening.  He feels the numbness is bothering him most at the moment The pain is located in the back area and radiates to the bilateral legs (wraps around knees and toward the medial aspect of lower leg.  The pain is described as numbing and shooting and is rated as 8/10. The pain is rated with a score of  2/10 on the BEST day and a score of 9/10 on the WORST day.  Symptoms interfere with daily activity and sleeping. The pain is exacerbated by Sitting, Standing, and Walking, walking, and is worst at night (feels numb in hands too).  The pain is mitigated by nothing. The patient reports 2-3 hours of uninterrupted sleep per night.    Patient denies urinary incontinence, bowel incontinence, and significant motor weakness.  He reports he had hip surgery before and felt his pain was related to his hip, however his CT scan 2 years ago showed some degenerative changes in his spine.  He reports his legs do not swell and the pain does not improve with elevation of his legs.    Physical Therapy/Home Exercise: no      Pain Disability Index Review:       No data to display                Pain Medications:  - gabapentin (made him dizzy, so only takes it if he needs it).  - diclofenac (helps)     report:  Reviewed and consistent with medication use as prescribed.  Alprazolam 0.25mg BID    Pain Procedures:   None    Imaging:   Echocardiography Findings    Left Ventricle The left ventricle is moderately enlarged with severely decreased systolic function. The estimated ejection fraction is 20%. There is grade II diastolic dysfunction consistent with  pseudonormalization. Left atrial pressure is elevated. There is global hypokinesis.   Right Ventricle Normal cavity size and systolic function.   Left Atrium There is severe left atrial enlargement. Left atrial volume index is 41.2 mL/m2.   Right Atrium The right atrium is mildly enlarged.   Aortic Valve The aortic valve appears structurally normal. The valve is trileaflet. There is normal leaflet mobility. No regurgitation. There is no stenosis.   Mitral Valve The mean pressure gradient across the mitral valve is 2 mmHg at a heart rate of. The mitral valve appears structurally normal. There is normal leaflet mobility.  There is mild to moderate mitral valve regurgitation. There is no stenosis. The estimated mitral valve area by pressure half time is 3.67 cm2.   Tricuspid Valve The tricuspid valve appears structurally normal. There is normal leaflet mobility. There is mild regurgitation. There is no tricuspid valve stenosis. The estimated PA systolic pressure is greater than 18 mmHg.   IVC/SVC Intermediate central venous pressure (8 mm Hg).   Pericardium and Other Findings There is no pericardial effusion.       CT lumbar spine without contrast    INDICATION: Low back pain    TECHNIQUE: CT lumbar spine performed without contrast using routine protocol. . Automated exposure control used.    FINDINGS:    No fracture or dislocation. Vertebral heights are maintained. No significant lateral curvature. Fused left anterior lateral bridging osteophytes involve the T11-T12 and T12-L1 levels.    L1-L2 normal disc space height, moderate facet arthrosis with osteophytic spurring on the right, moderate severe osseous foraminal narrowing on the right.    L2-L3 mild/moderate disc space narrowing, suspected disc bulge, bilateral mild facet arthrosis without osseous foraminal narrowing, possible spinal canal narrowing.    L3-L4 moderate disc space narrowing, vacuum disc, disc bulging, mild bilateral facet arthrosis, possible  spinal canal narrowing.    L4-L5 mild disc space narrowing, 3 mm anterolisthesis, suspected disc bulging, moderate severe bilateral facet arthrosis with sclerotic and osteophytic change. Possible spinal canal narrowing, mild osseous foraminal narrowing bilaterally.    L5-S1 moderate disc space narrowing, no visible spinal canal or foraminal narrowing.    Moderate degenerative related sclerotic change along the sacroiliac joints most significant inferiorly on the left.    All CT scans at this facility utilize dose modulation, iterative reconstruction, and/or weight based dosing when appropriate to reduce radiation dose to as low as reasonably achievable.     Possible spinal canal narrowing at L2-L3 through L4-5 secondary to disc bulging and facet arthrosis. Facet arthrosis most significant at L4-5 where there is associated anterolisthesis.    Multilevel foraminal narrowing as above.    Bilateral sacroiliitis.    Electronically Signed By: CHEMO Dixon MD 12/1/2021 11:32 AM CST    CTA Head/Neck  TECHNIQUE: CT angiography of brain and neck with 100 mL Omnipaque 350. Maximum intensity projection coronal and sagittal reformations were obtained at a separate workstation and stored in the patient's permanent medical record.     FINDINGS:     The RYAN, MCA and PCA are patent. Ethel of Johnson is within normal limits. The basilar artery is patent. Visualized cerebellar arteries are patent. The vertebral arteries are patent. There is minimal focal atherosclerosis of the supraclinoid right ICA. The ICA are otherwise patent. The CCA are patent bilaterally. No aneurysm or dissection observed. The dural venous sinuses are within normal limits. No acute intracranial abnormality observed.     The visualized thoracic aorta is normal caliber. The neck soft tissues within normal limits. Degenerative changes of the cervical spine. No acute osseous abnormality.     No past medical history on file.  No past surgical history on  file.  Social History     Socioeconomic History    Marital status:    Tobacco Use    Smoking status: Former     Types: Cigars    Smokeless tobacco: Never   Substance and Sexual Activity    Alcohol use: Yes     No family history on file.    Review of patient's allergies indicates:   Allergen Reactions    Ace inhibitors      cough    Adhesive      Skin rash       Current Outpatient Medications   Medication Sig    amiodarone (PACERONE) 200 MG Tab Take 2 tablets (400 mg total) by mouth once daily.    apixaban (ELIQUIS) 5 mg Tab Take 1 tablet (5 mg total) by mouth 2 (two) times daily.    atorvastatin (LIPITOR) 40 MG tablet Take one tablet by mouth daily    carvediloL (COREG) 25 MG tablet Take 1 tablet (25 mg total) by mouth 2 (two) times a day.    digoxin (LANOXIN) 250 mcg tablet Take 1 tablet (250 mcg total) by mouth once daily.    empagliflozin (JARDIANCE) 10 mg tablet Take 1 tablet (10 mg total) by mouth once daily.    furosemide (LASIX) 40 MG tablet Take 1 tablet (40 mg total) by mouth once daily. 1 Tablet Oral Every day    losartan (COZAAR) 50 MG tablet Take one tablet by mouth daily    meclizine (ANTIVERT) 25 mg tablet Take 1 tablet (25 mg total) by mouth 3 (three) times daily as needed for Dizziness.    tamsulosin (FLOMAX) 0.4 mg Cap Take by mouth once daily.    ALPRAZolam (XANAX) 0.25 MG tablet Take 1 tablet (0.25 mg total) by mouth 2 (two) times daily as needed for Anxiety.    aspirin (ECOTRIN) 81 MG EC tablet Take 1 tablet (81 mg total) by mouth once. 1 Tablet, Delayed Release (E.C.) Oral Every day for 1 dose    gabapentin (NEURONTIN) 300 MG capsule Take 1 capsule (300 mg total) by mouth 3 (three) times daily.    sildenafiL (VIAGRA) 100 MG tablet Take 1 tablet (100 mg total) by mouth daily as needed for Erectile Dysfunction.     No current facility-administered medications for this visit.     CMP  Sodium   Date Value Ref Range Status   10/11/2022 139 136 - 145 mmol/L Final     Potassium   Date Value Ref  Range Status   10/11/2022 4.3 3.5 - 5.1 mmol/L Final     Chloride   Date Value Ref Range Status   10/11/2022 102 95 - 110 mmol/L Final     CO2   Date Value Ref Range Status   10/11/2022 30 (H) 23 - 29 mmol/L Final     Glucose   Date Value Ref Range Status   10/11/2022 86 70 - 110 mg/dL Final     BUN   Date Value Ref Range Status   10/11/2022 23 (H) 6 - 20 mg/dL Final     Creatinine   Date Value Ref Range Status   10/11/2022 1.3 0.5 - 1.4 mg/dL Final     Calcium   Date Value Ref Range Status   10/11/2022 8.9 8.7 - 10.5 mg/dL Final     Total Protein   Date Value Ref Range Status   10/11/2022 6.8 6.0 - 8.4 g/dL Final     Albumin   Date Value Ref Range Status   10/11/2022 3.1 (L) 3.5 - 5.2 g/dL Final     Total Bilirubin   Date Value Ref Range Status   10/11/2022 0.6 0.1 - 1.0 mg/dL Final     Comment:     For infants and newborns, interpretation of results should be based  on gestational age, weight and in agreement with clinical  observations.    Premature Infant recommended reference ranges:  Up to 24 hours.............<8.0 mg/dL  Up to 48 hours............<12.0 mg/dL  3-5 days..................<15.0 mg/dL  6-29 days.................<15.0 mg/dL       Alkaline Phosphatase   Date Value Ref Range Status   10/11/2022 85 55 - 135 U/L Final     AST   Date Value Ref Range Status   10/11/2022 14 10 - 40 U/L Final     ALT   Date Value Ref Range Status   10/11/2022 13 10 - 44 U/L Final     Anion Gap   Date Value Ref Range Status   10/11/2022 7 (L) 8 - 16 mmol/L Final     eGFR   Date Value Ref Range Status   10/11/2022 >60.0 >60 mL/min/1.73 m^2 Final         REVIEW OF SYSTEMS:    GENERAL:  No weight loss, malaise or fevers.  HEENT:  Negative for frequent or significant headaches.  NECK:  Negative for lumps, goiter, and significant neck swelling. +neck pain  RESPIRATORY:  Negative for cough, wheezing or shortness of breath.  CARDIOVASCULAR:  Negative for chest pain, leg swelling or palpitations.  GI:  Negative for abdominal  "discomfort, blood in stools or black stools or change in bowel habits.  MUSCULOSKELETAL:  See HPI.  SKIN:  Negative for lesions, rash, and itching.  PSYCH:  Negative for sleep disturbance, mood disorder and recent psychosocial stressors.  HEMATOLOGY/LYMPHOLOGY:  Negative for prolonged bleeding, bruising easily or swollen nodes.  NEURO:   No history of headaches, syncope, paralysis, seizures or tremors.  All other reviewed and negative other than HPI.    OBJECTIVE:    BP (!) 142/80 (BP Location: Right arm, Patient Position: Sitting, BP Method: Medium (Automatic))   Pulse 83   Resp 18   Ht 5' 10" (1.778 m)   Wt 131.3 kg (289 lb 7.4 oz)   SpO2 100%   BMI 41.53 kg/m²     PHYSICAL EXAMINATION:  General appearance: Well appearing, in no acute distress, alert and appropriately communicative.  Psych:  Mood and affect appropriate.  Skin: Skin color, texture, turgor normal, no rashes or lesions, in both upper and lower body.  Head/face:  Atraumatic, normocephalic.  Cor: regular rate  Pulm: non-labored breathing  GI: Abdomen non-distended and non-tender.  Back: Straight leg raising in the sitting and supine positions is negative to radicular pain. No pain to palpation over the spine or paraspinal muscles. Slight pain with extension and facet loading to the right  Extremities: Peripheral joint ROM is full and pain free without obvious instability or laxity in all four extremities. No deformities, edema, or skin discoloration. Good capillary refill.  Musculoskeletal: hip, sacroiliac and knee provocative maneuvers are negative with the exception of left > right FADIR +. Bilateral upper and lower extremity strength is normal and symmetric.  No atrophy or tone abnormalities are noted.  Neuro: Bilateral upper and lower extremity coordination and muscle stretch reflexes are physiologic and symmetric.  Negative Clonus. No loss of sensation is noted.  Gait: Normal.      ASSESSMENT: 58 y.o. year old male with lower back pain, " consistent with:     1. Lumbar radiculopathy  gabapentin (NEURONTIN) 300 MG capsule    Ambulatory referral/consult to Physical/Occupational Therapy      2. Back pain, unspecified back location, unspecified back pain laterality, unspecified chronicity  Ambulatory referral/consult to [facility]      3. DDD (degenerative disc disease), lumbar  gabapentin (NEURONTIN) 300 MG capsule    Ambulatory referral/consult to Physical/Occupational Therapy        IMPRESSION: Mr. Cooper presents for chronic lower back pain and bilateral leg pain.  He has had this pain for years.  He has tried some medication management with some relief of his pain.  History and physical exam are consistent with lumbar radiculopathy.  No relevant imaging is available, however imaging reports are consistent with lumbar degenerative disc disease.  We will start with medication management and physical therapy.  We will also update his imaging.  If his pain persists despite conservative management, we will consider updated imaging and consideration for interventions.    PLAN:   - I have stressed the importance of physical activity and a home exercise plan to help with pain and improve health.  - Patient can continue with medications for now since they are providing benefits, using them appropriately, and without side effects.  - xray lumbar flexion/extension.  - start gabapentin 300mg three times a day, start at night. 1 per day for week 1, 2 per day for week 2, 3 per day for week 3  - continue diclofenac as prescribed by his PCP.  - referral to physical therapy for lower back pain  - RTC 6 - 8 weeks to discuss interventions, if indicated at that time  - Counseled patient regarding the importance of activity modification and physical therapy.    The above plan and management options were discussed at length with patient. Patient is in agreement with the above and verbalized understanding. It will be communicated with the referring physician via  electronic record, fax, or mail.    Db Gomez  11/29/2023

## 2023-12-12 ENCOUNTER — TELEPHONE (OUTPATIENT)
Dept: CARDIOLOGY | Facility: CLINIC | Age: 58
End: 2023-12-12
Payer: COMMERCIAL

## 2023-12-12 NOTE — TELEPHONE ENCOUNTER
Spoke to pt. He has been approved for Jardiance patient assitance thru 12/30/2024. Informed pt medication will ship today and he should receive it in the mail within 5-7 business days. Pt picked up Eliquis and Jardiance from pharmacy yesterday. He will start the medication today. Rescheduled labs for 12/27. Discussed avoiding NSAIDS since he is taking Eliquis. Pt verbalized understanding.

## 2023-12-12 NOTE — TELEPHONE ENCOUNTER
----- Message from Christine Pinzon RN sent at 12/11/2023  4:37 PM CST -----  Regarding: FW: Patient Advice    ----- Message -----  From: Ayde Alegria  Sent: 12/11/2023   3:39 PM CST  To: Anne [facility]diaye Staff  Subject: Patient Advice                                           Name of Who is Calling:  Franco Cooper    Who Left The Message:  Franco Cooper    Message Is For:   AUBREY      What is the request in detail:       Patient called requesting a call regarding that he has not started the newly prescribed medication (s) yet.  Patient states he's out of town and has to reschedule the blood work.   Please further advise.   Thank you      Reply by [facility]: NO      Preferred Call Back : [phone] ([phone])

## 2023-12-13 DIAGNOSIS — M54.16 LUMBAR RADICULOPATHY: ICD-10-CM

## 2023-12-13 DIAGNOSIS — M51.36 DDD (DEGENERATIVE DISC DISEASE), LUMBAR: ICD-10-CM

## 2023-12-13 RX ORDER — GABAPENTIN 300 MG/1
300 CAPSULE ORAL 3 TIMES DAILY
Qty: 90 CAPSULE | Refills: 2 | Status: ON HOLD | OUTPATIENT
Start: 2023-12-13 | End: 2024-04-01

## 2023-12-20 ENCOUNTER — HOSPITAL ENCOUNTER (OUTPATIENT)
Facility: OTHER | Age: 58
Discharge: HOME | End: 2023-12-21
Attending: EMERGENCY MEDICINE | Admitting: EMERGENCY MEDICINE
Payer: COMMERCIAL

## 2023-12-20 DIAGNOSIS — I42.8 NONISCHEMIC CARDIOMYOPATHY: ICD-10-CM

## 2023-12-20 DIAGNOSIS — R07.9 CHEST PAIN: ICD-10-CM

## 2023-12-20 DIAGNOSIS — R79.89 ELEVATED TROPONIN: Primary | ICD-10-CM

## 2023-12-20 LAB
ALBUMIN SERPL BCP-MCNC: 3.1 G/DL (ref 3.5–5.2)
ALP SERPL-CCNC: 78 U/L (ref 55–135)
ALT SERPL W/O P-5'-P-CCNC: 19 U/L (ref 10–44)
ANION GAP SERPL CALC-SCNC: 9 MMOL/L (ref 8–16)
AST SERPL-CCNC: 22 U/L (ref 10–40)
BASOPHILS # BLD AUTO: 0.06 K/UL (ref 0–0.2)
BASOPHILS NFR BLD: 0.7 % (ref 0–1.9)
BILIRUB SERPL-MCNC: 0.8 MG/DL (ref 0.1–1)
BNP SERPL-MCNC: 292 PG/ML (ref 0–99)
BUN SERPL-MCNC: 19 MG/DL (ref 6–20)
CALCIUM SERPL-MCNC: 8.4 MG/DL (ref 8.7–10.5)
CHLORIDE SERPL-SCNC: 102 MMOL/L (ref 95–110)
CO2 SERPL-SCNC: 28 MMOL/L (ref 23–29)
CREAT SERPL-MCNC: 1.5 MG/DL (ref 0.5–1.4)
DIFFERENTIAL METHOD: ABNORMAL
EOSINOPHIL # BLD AUTO: 0.2 K/UL (ref 0–0.5)
EOSINOPHIL NFR BLD: 2.6 % (ref 0–8)
ERYTHROCYTE [DISTWIDTH] IN BLOOD BY AUTOMATED COUNT: 14.9 % (ref 11.5–14.5)
EST. GFR  (NO RACE VARIABLE): 54 ML/MIN/1.73 M^2
GLUCOSE SERPL-MCNC: 89 MG/DL (ref 70–110)
HCT VFR BLD AUTO: 40.3 % (ref 40–54)
HCV AB SERPL QL IA: NEGATIVE
HGB BLD-MCNC: 13.1 G/DL (ref 14–18)
HIV 1+2 AB+HIV1 P24 AG SERPL QL IA: NEGATIVE
IMM GRANULOCYTES # BLD AUTO: 0.03 K/UL (ref 0–0.04)
IMM GRANULOCYTES NFR BLD AUTO: 0.4 % (ref 0–0.5)
LYMPHOCYTES # BLD AUTO: 2.2 K/UL (ref 1–4.8)
LYMPHOCYTES NFR BLD: 27.1 % (ref 18–48)
MCH RBC QN AUTO: 27.8 PG (ref 27–31)
MCHC RBC AUTO-ENTMCNC: 32.5 G/DL (ref 32–36)
MCV RBC AUTO: 86 FL (ref 82–98)
MONOCYTES # BLD AUTO: 0.8 K/UL (ref 0.3–1)
MONOCYTES NFR BLD: 9.2 % (ref 4–15)
NEUTROPHILS # BLD AUTO: 4.9 K/UL (ref 1.8–7.7)
NEUTROPHILS NFR BLD: 60 % (ref 38–73)
NRBC BLD-RTO: 0 /100 WBC
PLATELET # BLD AUTO: 263 K/UL (ref 150–450)
PMV BLD AUTO: 9.3 FL (ref 9.2–12.9)
POTASSIUM SERPL-SCNC: 3.8 MMOL/L (ref 3.5–5.1)
PROT SERPL-MCNC: 7.5 G/DL (ref 6–8.4)
RBC # BLD AUTO: 4.71 M/UL (ref 4.6–6.2)
SODIUM SERPL-SCNC: 139 MMOL/L (ref 136–145)
TROPONIN I SERPL DL<=0.01 NG/ML-MCNC: 0.06 NG/ML (ref 0–0.03)
TROPONIN I SERPL DL<=0.01 NG/ML-MCNC: 0.07 NG/ML (ref 0–0.03)
WBC # BLD AUTO: 8.17 K/UL (ref 3.9–12.7)

## 2023-12-20 PROCEDURE — G0378 HOSPITAL OBSERVATION PER HR: HCPCS

## 2023-12-20 PROCEDURE — 93010 EKG 12-LEAD: ICD-10-PCS | Mod: ,,, | Performed by: INTERNAL MEDICINE

## 2023-12-20 PROCEDURE — 85025 COMPLETE CBC W/AUTO DIFF WBC: CPT | Performed by: EMERGENCY MEDICINE

## 2023-12-20 PROCEDURE — 63600175 PHARM REV CODE 636 W HCPCS: Performed by: EMERGENCY MEDICINE

## 2023-12-20 PROCEDURE — 96375 TX/PRO/DX INJ NEW DRUG ADDON: CPT

## 2023-12-20 PROCEDURE — 87389 HIV-1 AG W/HIV-1&-2 AB AG IA: CPT | Performed by: EMERGENCY MEDICINE

## 2023-12-20 PROCEDURE — 25000003 PHARM REV CODE 250: Performed by: NURSE PRACTITIONER

## 2023-12-20 PROCEDURE — 80053 COMPREHEN METABOLIC PANEL: CPT | Performed by: EMERGENCY MEDICINE

## 2023-12-20 PROCEDURE — 96374 THER/PROPH/DIAG INJ IV PUSH: CPT

## 2023-12-20 PROCEDURE — 83880 ASSAY OF NATRIURETIC PEPTIDE: CPT | Performed by: EMERGENCY MEDICINE

## 2023-12-20 PROCEDURE — 84484 ASSAY OF TROPONIN QUANT: CPT | Mod: 91 | Performed by: NURSE PRACTITIONER

## 2023-12-20 PROCEDURE — 99285 EMERGENCY DEPT VISIT HI MDM: CPT | Mod: 25

## 2023-12-20 PROCEDURE — 25000003 PHARM REV CODE 250: Performed by: EMERGENCY MEDICINE

## 2023-12-20 PROCEDURE — 86803 HEPATITIS C AB TEST: CPT | Performed by: EMERGENCY MEDICINE

## 2023-12-20 PROCEDURE — 93005 ELECTROCARDIOGRAM TRACING: CPT

## 2023-12-20 PROCEDURE — 93010 ELECTROCARDIOGRAM REPORT: CPT | Mod: ,,, | Performed by: INTERNAL MEDICINE

## 2023-12-20 PROCEDURE — 84484 ASSAY OF TROPONIN QUANT: CPT | Performed by: EMERGENCY MEDICINE

## 2023-12-20 PROCEDURE — 25000242 PHARM REV CODE 250 ALT 637 W/ HCPCS: Performed by: EMERGENCY MEDICINE

## 2023-12-20 RX ORDER — CARVEDILOL 12.5 MG/1
25 TABLET ORAL 2 TIMES DAILY
Status: DISCONTINUED | OUTPATIENT
Start: 2023-12-20 | End: 2023-12-21 | Stop reason: HOSPADM

## 2023-12-20 RX ORDER — AMIODARONE HYDROCHLORIDE 200 MG/1
400 TABLET ORAL DAILY
Status: DISCONTINUED | OUTPATIENT
Start: 2023-12-21 | End: 2023-12-21 | Stop reason: HOSPADM

## 2023-12-20 RX ORDER — TAMSULOSIN HYDROCHLORIDE 0.4 MG/1
0.4 CAPSULE ORAL DAILY
Status: DISCONTINUED | OUTPATIENT
Start: 2023-12-21 | End: 2023-12-21 | Stop reason: HOSPADM

## 2023-12-20 RX ORDER — ONDANSETRON 2 MG/ML
4 INJECTION INTRAMUSCULAR; INTRAVENOUS EVERY 8 HOURS PRN
Status: DISCONTINUED | OUTPATIENT
Start: 2023-12-20 | End: 2023-12-21 | Stop reason: HOSPADM

## 2023-12-20 RX ORDER — ATORVASTATIN CALCIUM 20 MG/1
40 TABLET, FILM COATED ORAL DAILY
Status: DISCONTINUED | OUTPATIENT
Start: 2023-12-21 | End: 2023-12-21 | Stop reason: HOSPADM

## 2023-12-20 RX ORDER — FLUOCINONIDE 0.5 MG/G
CREAM TOPICAL ONCE
Status: DISCONTINUED | OUTPATIENT
Start: 2023-12-20 | End: 2023-12-20

## 2023-12-20 RX ORDER — LIDOCAINE HYDROCHLORIDE 20 MG/ML
15 SOLUTION OROPHARYNGEAL ONCE AS NEEDED
Status: DISCONTINUED | OUTPATIENT
Start: 2023-12-20 | End: 2023-12-21 | Stop reason: HOSPADM

## 2023-12-20 RX ORDER — GABAPENTIN 300 MG/1
300 CAPSULE ORAL 3 TIMES DAILY
Status: DISCONTINUED | OUTPATIENT
Start: 2023-12-20 | End: 2023-12-21 | Stop reason: HOSPADM

## 2023-12-20 RX ORDER — DIGOXIN 125 MCG
250 TABLET ORAL DAILY
Status: DISCONTINUED | OUTPATIENT
Start: 2023-12-21 | End: 2023-12-21 | Stop reason: HOSPADM

## 2023-12-20 RX ORDER — ALPRAZOLAM 0.25 MG/1
0.25 TABLET ORAL 2 TIMES DAILY PRN
Status: DISCONTINUED | OUTPATIENT
Start: 2023-12-20 | End: 2023-12-21 | Stop reason: HOSPADM

## 2023-12-20 RX ORDER — DIPHENHYDRAMINE HYDROCHLORIDE 50 MG/ML
25 INJECTION INTRAMUSCULAR; INTRAVENOUS NIGHTLY PRN
Status: DISCONTINUED | OUTPATIENT
Start: 2023-12-20 | End: 2023-12-21 | Stop reason: HOSPADM

## 2023-12-20 RX ORDER — KETOROLAC TROMETHAMINE 30 MG/ML
10 INJECTION, SOLUTION INTRAMUSCULAR; INTRAVENOUS EVERY 8 HOURS PRN
Status: DISCONTINUED | OUTPATIENT
Start: 2023-12-20 | End: 2023-12-21 | Stop reason: HOSPADM

## 2023-12-20 RX ORDER — SODIUM CHLORIDE 0.9 % (FLUSH) 0.9 %
10 SYRINGE (ML) INJECTION
Status: DISCONTINUED | OUTPATIENT
Start: 2023-12-20 | End: 2023-12-21 | Stop reason: HOSPADM

## 2023-12-20 RX ORDER — NITROGLYCERIN 0.4 MG/1
0.4 TABLET SUBLINGUAL
Status: COMPLETED | OUTPATIENT
Start: 2023-12-20 | End: 2023-12-20

## 2023-12-20 RX ORDER — KETOROLAC TROMETHAMINE 30 MG/ML
15 INJECTION, SOLUTION INTRAMUSCULAR; INTRAVENOUS
Status: COMPLETED | OUTPATIENT
Start: 2023-12-20 | End: 2023-12-20

## 2023-12-20 RX ORDER — HYDROXYZINE PAMOATE 25 MG/1
25 CAPSULE ORAL
Status: DISCONTINUED | OUTPATIENT
Start: 2023-12-20 | End: 2023-12-20

## 2023-12-20 RX ORDER — MECLIZINE HYDROCHLORIDE 25 MG/1
25 TABLET ORAL 3 TIMES DAILY PRN
Status: DISCONTINUED | OUTPATIENT
Start: 2023-12-20 | End: 2023-12-21 | Stop reason: HOSPADM

## 2023-12-20 RX ORDER — HYDROCODONE BITARTRATE AND ACETAMINOPHEN 5; 325 MG/1; MG/1
1 TABLET ORAL EVERY 8 HOURS PRN
Status: DISCONTINUED | OUTPATIENT
Start: 2023-12-20 | End: 2023-12-21 | Stop reason: HOSPADM

## 2023-12-20 RX ORDER — ASPIRIN 81 MG/1
81 TABLET ORAL ONCE
Status: DISCONTINUED | OUTPATIENT
Start: 2023-12-20 | End: 2023-12-20

## 2023-12-20 RX ORDER — LOSARTAN POTASSIUM 50 MG/1
50 TABLET ORAL DAILY
Status: DISCONTINUED | OUTPATIENT
Start: 2023-12-21 | End: 2023-12-21 | Stop reason: HOSPADM

## 2023-12-20 RX ORDER — TALC
6 POWDER (GRAM) TOPICAL NIGHTLY PRN
Status: DISCONTINUED | OUTPATIENT
Start: 2023-12-20 | End: 2023-12-21 | Stop reason: HOSPADM

## 2023-12-20 RX ORDER — ASPIRIN 81 MG/1
81 TABLET ORAL ONCE
Status: COMPLETED | OUTPATIENT
Start: 2023-12-21 | End: 2023-12-21

## 2023-12-20 RX ORDER — ACETAMINOPHEN 325 MG/1
650 TABLET ORAL EVERY 6 HOURS PRN
Status: DISCONTINUED | OUTPATIENT
Start: 2023-12-20 | End: 2023-12-21 | Stop reason: HOSPADM

## 2023-12-20 RX ORDER — PROCHLORPERAZINE MALEATE 5 MG
10 TABLET ORAL 3 TIMES DAILY PRN
Status: DISCONTINUED | OUTPATIENT
Start: 2023-12-20 | End: 2023-12-21 | Stop reason: HOSPADM

## 2023-12-20 RX ORDER — MORPHINE SULFATE 4 MG/ML
4 INJECTION, SOLUTION INTRAMUSCULAR; INTRAVENOUS EVERY 4 HOURS PRN
Status: DISCONTINUED | OUTPATIENT
Start: 2023-12-20 | End: 2023-12-21 | Stop reason: HOSPADM

## 2023-12-20 RX ORDER — MORPHINE SULFATE 2 MG/ML
2 INJECTION, SOLUTION INTRAMUSCULAR; INTRAVENOUS
Status: COMPLETED | OUTPATIENT
Start: 2023-12-20 | End: 2023-12-20

## 2023-12-20 RX ORDER — FUROSEMIDE 40 MG/1
40 TABLET ORAL DAILY
Status: DISCONTINUED | OUTPATIENT
Start: 2023-12-21 | End: 2023-12-21 | Stop reason: HOSPADM

## 2023-12-20 RX ORDER — MAG HYDROX/ALUMINUM HYD/SIMETH 200-200-20
30 SUSPENSION, ORAL (FINAL DOSE FORM) ORAL
Status: COMPLETED | OUTPATIENT
Start: 2023-12-20 | End: 2023-12-20

## 2023-12-20 RX ORDER — NAPROXEN SODIUM 220 MG/1
162 TABLET, FILM COATED ORAL
Status: COMPLETED | OUTPATIENT
Start: 2023-12-20 | End: 2023-12-20

## 2023-12-20 RX ORDER — MAG HYDROX/ALUMINUM HYD/SIMETH 200-200-20
30 SUSPENSION, ORAL (FINAL DOSE FORM) ORAL ONCE AS NEEDED
Status: DISCONTINUED | OUTPATIENT
Start: 2023-12-20 | End: 2023-12-21 | Stop reason: HOSPADM

## 2023-12-20 RX ADMIN — KETOROLAC TROMETHAMINE 15 MG: 30 INJECTION, SOLUTION INTRAMUSCULAR at 04:12

## 2023-12-20 RX ADMIN — GABAPENTIN 300 MG: 300 CAPSULE ORAL at 10:12

## 2023-12-20 RX ADMIN — NITROGLYCERIN 0.4 MG: 0.4 TABLET, ORALLY DISINTEGRATING SUBLINGUAL at 06:12

## 2023-12-20 RX ADMIN — APIXABAN 5 MG: 2.5 TABLET, FILM COATED ORAL at 10:12

## 2023-12-20 RX ADMIN — ASPIRIN 81 MG CHEWABLE TABLET 162 MG: 81 TABLET CHEWABLE at 08:12

## 2023-12-20 RX ADMIN — ALUMINUM HYDROXIDE, MAGNESIUM HYDROXIDE, AND SIMETHICONE 30 ML: 200; 200; 20 SUSPENSION ORAL at 04:12

## 2023-12-20 RX ADMIN — MORPHINE SULFATE 2 MG: 2 INJECTION, SOLUTION INTRAMUSCULAR; INTRAVENOUS at 08:12

## 2023-12-20 NOTE — Clinical Note
Diagnosis: Elevated troponin [281820]   Future Attending Provider: CLARA BOWIE [[id number]]   Is the patient being sent to ED Observation?: Yes   Admitting Provider:: CLARA BOWIE [[id number]]
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
## 2023-12-20 NOTE — ED NOTES
Pt to ED with L sided intermittent CP since 1000. AICD in place. Took 81mg ASA PTA. Pt appears uncomfortable. VSS, AAOx4.

## 2023-12-21 VITALS
OXYGEN SATURATION: 97 % | WEIGHT: 280 LBS | HEART RATE: 59 BPM | BODY MASS INDEX: 40.09 KG/M2 | RESPIRATION RATE: 20 BRPM | HEIGHT: 70 IN | DIASTOLIC BLOOD PRESSURE: 60 MMHG | TEMPERATURE: 98 F | SYSTOLIC BLOOD PRESSURE: 112 MMHG

## 2023-12-21 LAB
ALBUMIN SERPL BCP-MCNC: 2.8 G/DL (ref 3.5–5.2)
ALP SERPL-CCNC: 72 U/L (ref 55–135)
ALT SERPL W/O P-5'-P-CCNC: 14 U/L (ref 10–44)
ANION GAP SERPL CALC-SCNC: 11 MMOL/L (ref 8–16)
AST SERPL-CCNC: 20 U/L (ref 10–40)
BASOPHILS # BLD AUTO: 0.05 K/UL (ref 0–0.2)
BASOPHILS NFR BLD: 0.8 % (ref 0–1.9)
BILIRUB SERPL-MCNC: 0.7 MG/DL (ref 0.1–1)
BUN SERPL-MCNC: 22 MG/DL (ref 6–20)
CALCIUM SERPL-MCNC: 8.3 MG/DL (ref 8.7–10.5)
CHLORIDE SERPL-SCNC: 103 MMOL/L (ref 95–110)
CO2 SERPL-SCNC: 25 MMOL/L (ref 23–29)
CREAT SERPL-MCNC: 1.4 MG/DL (ref 0.5–1.4)
DIFFERENTIAL METHOD: ABNORMAL
DIGOXIN SERPL-MCNC: 1.6 NG/ML (ref 0.8–2)
EOSINOPHIL # BLD AUTO: 0.2 K/UL (ref 0–0.5)
EOSINOPHIL NFR BLD: 3.2 % (ref 0–8)
ERYTHROCYTE [DISTWIDTH] IN BLOOD BY AUTOMATED COUNT: 15.4 % (ref 11.5–14.5)
EST. GFR  (NO RACE VARIABLE): 59 ML/MIN/1.73 M^2
GLUCOSE SERPL-MCNC: 87 MG/DL (ref 70–110)
HCT VFR BLD AUTO: 37.5 % (ref 40–54)
HGB BLD-MCNC: 12.1 G/DL (ref 14–18)
IMM GRANULOCYTES # BLD AUTO: 0.03 K/UL (ref 0–0.04)
IMM GRANULOCYTES NFR BLD AUTO: 0.5 % (ref 0–0.5)
LYMPHOCYTES # BLD AUTO: 2.1 K/UL (ref 1–4.8)
LYMPHOCYTES NFR BLD: 31 % (ref 18–48)
MCH RBC QN AUTO: 27.4 PG (ref 27–31)
MCHC RBC AUTO-ENTMCNC: 32.3 G/DL (ref 32–36)
MCV RBC AUTO: 85 FL (ref 82–98)
MONOCYTES # BLD AUTO: 0.8 K/UL (ref 0.3–1)
MONOCYTES NFR BLD: 11.5 % (ref 4–15)
NEUTROPHILS # BLD AUTO: 3.5 K/UL (ref 1.8–7.7)
NEUTROPHILS NFR BLD: 53 % (ref 38–73)
NRBC BLD-RTO: 0 /100 WBC
PLATELET # BLD AUTO: 267 K/UL (ref 150–450)
PMV BLD AUTO: 9.9 FL (ref 9.2–12.9)
POTASSIUM SERPL-SCNC: 4 MMOL/L (ref 3.5–5.1)
PROT SERPL-MCNC: 6.4 G/DL (ref 6–8.4)
RBC # BLD AUTO: 4.42 M/UL (ref 4.6–6.2)
SODIUM SERPL-SCNC: 139 MMOL/L (ref 136–145)
TROPONIN I SERPL DL<=0.01 NG/ML-MCNC: 0.04 NG/ML (ref 0–0.03)
TROPONIN I SERPL DL<=0.01 NG/ML-MCNC: 0.06 NG/ML (ref 0–0.03)
WBC # BLD AUTO: 6.62 K/UL (ref 3.9–12.7)

## 2023-12-21 PROCEDURE — 63600175 PHARM REV CODE 636 W HCPCS: Performed by: INTERNAL MEDICINE

## 2023-12-21 PROCEDURE — 84484 ASSAY OF TROPONIN QUANT: CPT | Performed by: NURSE PRACTITIONER

## 2023-12-21 PROCEDURE — 99214 PR OFFICE/OUTPT VISIT, EST, LEVL IV, 30-39 MIN: ICD-10-PCS | Mod: ,,, | Performed by: INTERNAL MEDICINE

## 2023-12-21 PROCEDURE — 99214 OFFICE O/P EST MOD 30 MIN: CPT | Mod: ,,, | Performed by: INTERNAL MEDICINE

## 2023-12-21 PROCEDURE — G0378 HOSPITAL OBSERVATION PER HR: HCPCS

## 2023-12-21 PROCEDURE — 80053 COMPREHEN METABOLIC PANEL: CPT | Performed by: EMERGENCY MEDICINE

## 2023-12-21 PROCEDURE — 25000003 PHARM REV CODE 250: Performed by: NURSE PRACTITIONER

## 2023-12-21 PROCEDURE — 85025 COMPLETE CBC W/AUTO DIFF WBC: CPT | Performed by: NURSE PRACTITIONER

## 2023-12-21 PROCEDURE — 25000242 PHARM REV CODE 250 ALT 637 W/ HCPCS: Performed by: NURSE PRACTITIONER

## 2023-12-21 PROCEDURE — 96376 TX/PRO/DX INJ SAME DRUG ADON: CPT

## 2023-12-21 PROCEDURE — 80162 ASSAY OF DIGOXIN TOTAL: CPT | Performed by: INTERNAL MEDICINE

## 2023-12-21 RX ORDER — KETOROLAC TROMETHAMINE 30 MG/ML
30 INJECTION, SOLUTION INTRAMUSCULAR; INTRAVENOUS ONCE
Status: COMPLETED | OUTPATIENT
Start: 2023-12-21 | End: 2023-12-21

## 2023-12-21 RX ADMIN — EMPAGLIFLOZIN 10 MG: 10 TABLET, FILM COATED ORAL at 09:12

## 2023-12-21 RX ADMIN — AMIODARONE HYDROCHLORIDE 400 MG: 200 TABLET ORAL at 08:12

## 2023-12-21 RX ADMIN — ASPIRIN 81 MG: 81 TABLET, COATED ORAL at 08:12

## 2023-12-21 RX ADMIN — ATORVASTATIN CALCIUM 40 MG: 20 TABLET, FILM COATED ORAL at 08:12

## 2023-12-21 RX ADMIN — GABAPENTIN 300 MG: 300 CAPSULE ORAL at 08:12

## 2023-12-21 RX ADMIN — TAMSULOSIN HYDROCHLORIDE 0.4 MG: 0.4 CAPSULE ORAL at 08:12

## 2023-12-21 RX ADMIN — DIGOXIN 250 MCG: 125 TABLET ORAL at 09:12

## 2023-12-21 RX ADMIN — FUROSEMIDE 40 MG: 40 TABLET ORAL at 08:12

## 2023-12-21 RX ADMIN — APIXABAN 5 MG: 2.5 TABLET, FILM COATED ORAL at 08:12

## 2023-12-21 RX ADMIN — CARVEDILOL 25 MG: 12.5 TABLET, FILM COATED ORAL at 08:12

## 2023-12-21 RX ADMIN — LOSARTAN POTASSIUM 50 MG: 50 TABLET, FILM COATED ORAL at 08:12

## 2023-12-21 RX ADMIN — KETOROLAC TROMETHAMINE 30 MG: 30 INJECTION, SOLUTION INTRAMUSCULAR at 03:12

## 2023-12-21 NOTE — CONSULTS
Cardiology Consult  12/21/2023  3:15 PM    Attending Cardiologist: Senthil Rodríguez M.D.  Primary Care Provider: Tomas Hidalgo MD  Chief Complaint/Reason For Consultation:  CP      Problem list  Patient Active Problem List   Diagnosis    Automatic implantable cardioverter-defibrillator in situ    Essential hypertension    Mixed hyperlipidemia    Nonischemic cardiomyopathy    VT (ventricular tachycardia)    BMI 39.0-39.9,adult    Morbid obesity with BMI of 40.0-44.9, adult       CC:  CP    HPI:  Franco Cooper is a 58 y.o.year-old male with NICMP (normal coronary angiogram in 2019), ICD, VT, HTN, obesity, PAF (on apixaban) who came in last night with complaint of chest pain.  His chest pains sharp sensation in the midsternum reproducible with palpation.  Patient stated that the day prior, he was cutting his grass and try to start his lawn more and he had to pull the starter a few times.  He woke up that night with sharp pain.  This morning he feels better.    Medications  Current Facility-Administered Medications   Medication Dose Route Frequency Provider Last Rate Last Admin    acetaminophen tablet 650 mg  650 mg Oral Q6H PRN Uphold, Rickie LUEVANO, NP        ALPRAZolam tablet 0.25 mg  0.25 mg Oral BID PRN Uphold, Rickie LUEVANO, NP        aluminum-magnesium hydroxide-simethicone 200-200-20 mg/5 mL suspension 30 mL  30 mL Oral Once PRN Uphold, Rickie LUEVANO NP        And    LIDOcaine viscous HCl 2% oral solution 15 mL  15 mL Oral Once PRN Uphold, Rickie LUEVANO NP        amiodarone tablet 400 mg  400 mg Oral Daily Uphold, Rickie LUEVANO NP   400 mg at 12/21/23 0840    apixaban tablet 5 mg  5 mg Oral BID Uphold, Rickie LUEVANO, NP   5 mg at 12/21/23 0840    atorvastatin tablet 40 mg  40 mg Oral Daily Uphold, Rickie LUEVANO, NP   40 mg at 12/21/23 0840    carvediloL tablet 25 mg  25 mg Oral BID Uphold, Rickie LUEVANO, NP   25 mg at 12/21/23 0840    digoxin tablet 250 mcg  250 mcg Oral Daily Uphold, Rickie LUEVANO, NP   250 mcg at 12/21/23 0903     diphenhydrAMINE injection 25 mg  25 mg Intravenous Nightly PRN Uphold, Rickie LUEVANO, NP        empagliflozin (Jardiance) tablet 10 mg  10 mg Oral Daily Uphold, Rickie LUEVANO, NP   10 mg at 12/21/23 0903    furosemide tablet 40 mg  40 mg Oral Daily Uphold, Rickie LUEVANO, NP   40 mg at 12/21/23 0840    gabapentin capsule 300 mg  300 mg Oral TID Uphold, Rickie LUEVANO, NP   300 mg at 12/21/23 0843    HYDROcodone-acetaminophen 5-325 mg per tablet 1 tablet  1 tablet Oral Q8H PRN Uphold, Rickie LUEVANO, NP        ketorolac injection 30 mg  30 mg Intravenous Once Senthil Rodríguez MD        ketorolac injection 9.999 mg  9.999 mg Intravenous Q8H PRN Uphold, Rickie LUEVANO, NP        losartan tablet 50 mg  50 mg Oral Daily Uphold, Rickie LUEVANO, NP   50 mg at 12/21/23 0840    meclizine tablet 25 mg  25 mg Oral TID PRN Uphold, Rickie LUEVANO, NP        melatonin tablet 6 mg  6 mg Oral Nightly PRN Uphold, Rickie LUEVANO, NP        morphine injection 4 mg  4 mg Intravenous Q4H PRN UpholdRickie, NP        ondansetron injection 4 mg  4 mg Intravenous Q8H PRN Uphold, Rickie LUEVANO, NP        prochlorperazine tablet 10 mg  10 mg Oral TID PRN Uphold, Rickie LUEVANO, NP        sodium chloride 0.9% flush 10 mL  10 mL Intravenous PRN Uphold, Rickie LUEVANO, NP        tamsulosin 24 hr capsule 0.4 mg  0.4 mg Oral Daily Uphold, Rickie LUEVANO, NP   0.4 mg at 12/21/23 0840     Current Outpatient Medications   Medication Sig Dispense Refill    ALPRAZolam (XANAX) 0.25 MG tablet Take 1 tablet (0.25 mg total) by mouth 2 (two) times daily as needed for Anxiety. 30 tablet 1    amiodarone (PACERONE) 200 MG Tab Take 2 tablets (400 mg total) by mouth once daily. 180 tablet 3    apixaban (ELIQUIS) 5 mg Tab Take 1 tablet (5 mg total) by mouth 2 (two) times daily. 180 tablet 3    aspirin (ECOTRIN) 81 MG EC tablet Take 1 tablet (81 mg total) by mouth once. 1 Tablet, Delayed Release (E.C.) Oral Every day for 1 dose 90 tablet 3    atorvastatin (LIPITOR) 40 MG tablet Take one tablet by mouth daily 90 tablet  3    carvediloL (COREG) 25 MG tablet Take 1 tablet (25 mg total) by mouth 2 (two) times a day. 180 tablet 3    digoxin (LANOXIN) 250 mcg tablet Take 1 tablet (250 mcg total) by mouth once daily. 90 tablet 3    empagliflozin (JARDIANCE) 10 mg tablet Take 1 tablet (10 mg total) by mouth once daily. 90 tablet 3    furosemide (LASIX) 40 MG tablet Take 1 tablet (40 mg total) by mouth once daily. 1 Tablet Oral Every day 90 tablet 3    gabapentin (NEURONTIN) 300 MG capsule Take 1 capsule (300 mg total) by mouth 3 (three) times daily. 90 capsule 2    losartan (COZAAR) 50 MG tablet Take one tablet by mouth daily 90 tablet 3    meclizine (ANTIVERT) 25 mg tablet Take 1 tablet (25 mg total) by mouth 3 (three) times daily as needed for Dizziness. 30 tablet 1    sildenafiL (VIAGRA) 100 MG tablet Take 1 tablet (100 mg total) by mouth daily as needed for Erectile Dysfunction. 10 tablet 3    tamsulosin (FLOMAX) 0.4 mg Cap Take by mouth once daily.        Prior to Admission medications    Medication Sig Start Date End Date Taking? Authorizing Provider   ALPRAZolam (XANAX) 0.25 MG tablet Take 1 tablet (0.25 mg total) by mouth 2 (two) times daily as needed for Anxiety. 4/6/22 5/6/22  Senthil Rodríguez MD   amiodarone (PACERONE) 200 MG Tab Take 2 tablets (400 mg total) by mouth once daily. 6/1/23 5/31/24  Senthil Rodríguez MD   apixaban (ELIQUIS) 5 mg Tab Take 1 tablet (5 mg total) by mouth 2 (two) times daily. 11/30/23   Senthil Rodríguez MD   aspirin (ECOTRIN) 81 MG EC tablet Take 1 tablet (81 mg total) by mouth once. 1 Tablet, Delayed Release (E.C.) Oral Every day for 1 dose 4/4/22 4/14/22  Senthil Rodríguez MD   atorvastatin (LIPITOR) 40 MG tablet Take one tablet by mouth daily 6/1/23   Senthil Rodríguez MD   carvediloL (COREG) 25 MG tablet Take 1 tablet (25 mg total) by mouth 2 (two) times a day. 4/4/22   Senthil Rodríguez MD   digoxin (LANOXIN) 250 mcg tablet Take 1 tablet (250 mcg total) by mouth once daily. 4/4/22   Anne,  Senthil SHEARER MD   empagliflozin (JARDIANCE) 10 mg tablet Take 1 tablet (10 mg total) by mouth once daily. 11/30/23   Senthil Rodríguez MD   furosemide (LASIX) 40 MG tablet Take 1 tablet (40 mg total) by mouth once daily. 1 Tablet Oral Every day 4/4/22   Senthil Rodríguez MD   gabapentin (NEURONTIN) 300 MG capsule Take 1 capsule (300 mg total) by mouth 3 (three) times daily. 12/13/23   Db oGmez MD   losartan (COZAAR) 50 MG tablet Take one tablet by mouth daily 6/1/23   Senthil Rodríguez MD   meclizine (ANTIVERT) 25 mg tablet Take 1 tablet (25 mg total) by mouth 3 (three) times daily as needed for Dizziness. 4/4/22   Senthil Rodríguez MD   sildenafiL (VIAGRA) 100 MG tablet Take 1 tablet (100 mg total) by mouth daily as needed for Erectile Dysfunction. 3/9/21 3/9/22  Senthil Rodríguez MD   tamsulosin (FLOMAX) 0.4 mg Cap Take by mouth once daily.    Provider, Historical         History  Past Medical History:   Diagnosis Date    A-fib     AICD (automatic cardioverter/defibrillator) present     CHF (congestive heart failure)     DDD (degenerative disc disease), lumbar     HTN (hypertension)     Lumbar radiculopathy     Mixed hyperlipidemia     SHERYL (obstructive sleep apnea)     Other cardiomyopathies     TIA (transient ischemic attack)     VT (ventricular tachycardia)      History reviewed. No pertinent surgical history.  Social History     Socioeconomic History    Marital status:    Tobacco Use    Smoking status: Former     Types: Cigars    Smokeless tobacco: Never   Substance and Sexual Activity    Alcohol use: Yes    Drug use: Never    Sexual activity: Not Currently         Allergies  Review of patient's allergies indicates:   Allergen Reactions    Ace inhibitors      cough    Adhesive      Skin rash         Review of Systems   Review of Systems   Constitutional: Negative for decreased appetite, fever and weight loss.   HENT:  Negative for congestion and nosebleeds.    Eyes:  Negative for double vision, vision  loss in left eye, vision loss in right eye and visual disturbance.   Cardiovascular:  Positive for chest pain. Negative for claudication, cyanosis, dyspnea on exertion, irregular heartbeat, leg swelling, near-syncope, orthopnea, palpitations, paroxysmal nocturnal dyspnea and syncope.   Respiratory:  Negative for cough, hemoptysis, shortness of breath, sleep disturbances due to breathing, snoring, sputum production and wheezing.    Endocrine: Negative for cold intolerance and heat intolerance.   Skin:  Negative for nail changes and rash.   Musculoskeletal:  Negative for joint pain, muscle cramps, muscle weakness and myalgias.   Gastrointestinal:  Negative for change in bowel habit, heartburn, hematemesis, hematochezia, hemorrhoids and melena.   Neurological:  Negative for dizziness, focal weakness and headaches.         Physical Exam  Wt Readings from Last 1 Encounters:   12/20/23 127 kg (280 lb)     BP Readings from Last 3 Encounters:   12/21/23 112/60   11/29/23 (!) 142/80   11/27/23 112/68     Pulse Readings from Last 1 Encounters:   12/21/23 (!) 59     Body mass index is 40.18 kg/m².    Physical Exam  Vitals reviewed.   Constitutional:       Appearance: He is well-developed. He is obese.   HENT:      Head: Atraumatic.   Eyes:      General: No scleral icterus.  Neck:      Vascular: Normal carotid pulses. No carotid bruit, hepatojugular reflux or JVD.   Cardiovascular:      Rate and Rhythm: Normal rate and regular rhythm.      Chest Wall: PMI is not displaced.      Pulses: Intact distal pulses.           Carotid pulses are 2+ on the right side and 2+ on the left side.       Radial pulses are 2+ on the right side and 2+ on the left side.        Dorsalis pedis pulses are 2+ on the right side and 2+ on the left side.      Heart sounds: Normal heart sounds, S1 normal and S2 normal. No murmur heard.     No friction rub.      Comments: L side ICD.  Chest pains reproducible on exam  Pulmonary:      Effort: Pulmonary  "effort is normal. No respiratory distress.      Breath sounds: Normal breath sounds. No stridor. No wheezing or rales.   Chest:      Chest wall: No tenderness.   Abdominal:      General: Bowel sounds are normal.      Palpations: Abdomen is soft.   Musculoskeletal:      Cervical back: Neck supple. No edema.   Skin:     General: Skin is warm and dry.      Nails: There is no clubbing.   Neurological:      Mental Status: He is alert and oriented to person, place, and time.   Psychiatric:         Behavior: Behavior normal.         Thought Content: Thought content normal.           Laboratory:  Trended Lab Data:  Recent Labs   Lab 12/20/23  1533 12/21/23  0516   WBC 8.17 6.62   HGB 13.1* 12.1*   HCT 40.3 37.5*    267       Recent Labs   Lab 12/20/23  1533 12/21/23  0603    139   K 3.8 4.0    103   CO2 28 25   BUN 19 22*   GLU 89 87   CALCIUM 8.4* 8.3*       Recent Labs   Lab 12/20/23  1533 12/21/23  0603   PROT 7.5 6.4   ALBUMIN 3.1* 2.8*   BILITOT 0.8 0.7   AST 22 20   ALT 19 14   ALKPHOS 78 72       No results for input(s): "PROTIME", "PTT", "INR" in the last 168 hours.    Cardiac:   Recent Labs   Lab 12/20/23  1533 12/20/23  1828 12/20/23  2353 12/21/23  0516   TROPONINI 0.064* 0.066* 0.061* 0.045*   *  --   --   --        FLP:   Lab Results   Component Value Date    CHOL 151 08/22/2022    HDL 59 08/22/2022    LDLCALC 82.4 08/22/2022    TRIG 48 08/22/2022    CHOLHDL 39.1 08/22/2022     DM:   Lab Results   Component Value Date    HGBA1C 5.2 01/06/2023    HGBA1C 5.8 01/23/2008    LDLCALC 82.4 08/22/2022    CREATININE 1.4 12/21/2023     Thyroid:   Lab Results   Component Value Date    TSH 2.691 08/22/2022     Anemia: No results found for: "IRON", "TIBC", "FERRITIN", "[id number]", "FOLATE"  Urinalysis:   Lab Results   Component Value Date    COLORU Yellow 10/16/2023    UROBILINOGEN 1.0 10/16/2023     @    Other Results:  EKG (my interpretation):    TELEMETRY:      Echo:   Results for orders placed " or performed during the hospital encounter of 10/11/22   Echo   Result Value Ref Range    BSA 2.54 m2    TDI SEPTAL 0.03 m/s    LV LATERAL E/E' RATIO 18.67 m/s    LV SEPTAL E/E' RATIO 18.67 m/s    LA WIDTH 5.60 cm    Right Atrial Pressure (from IVC) 8 mmHg    IVC diameter 1.66 cm    Left Ventricular Outflow Tract Mean Velocity 0.51 cm/s    Left Ventricular Outflow Tract Mean Gradient 1.17 mmHg    Pulmonary Valve Mean Velocity 0.59 m/s    TDI LATERAL 0.03 m/s    PV PEAK VELOCITY 0.89 cm/s    LVIDd 6.93 (A) 3.5 - 6.0 cm    IVS 1.43 (A) 0.6 - 1.1 cm    Posterior Wall 1.38 (A) 0.6 - 1.1 cm    LVIDs 6.19 (A) 2.1 - 4.0 cm    FS 11 28 - 44 %    LA volume 123.02 cm3    Sinus 3.90 cm    STJ 1.96 cm    Ascending aorta 3.05 cm    LV mass 492.48 g    LA size 4.34 cm    TAPSE 2.40 cm    Left Ventricle Relative Wall Thickness 0.40 cm    AV mean gradient 4 mmHg    AV valve area 2.61 cm2    AV Velocity Ratio 0.56     AV index (prosthetic) 0.50     MV mean gradient 2 mmHg    MV valve area p 1/2 method 3.67 cm2    MV valve area by continuity eq 2.57 cm2    E/A ratio 0.65     Mean e' 0.03 m/s    E wave deceleration time 206.78 msec    Pulm vein S/D ratio 1.59     LVOT diameter 2.57 cm    LVOT area 5.2 cm2    LVOT peak juan 0.76 m/s    LVOT peak VTI 13.10 cm    Ao peak juan 1.35 m/s    Ao VTI 26.0 cm    RVOT peak juan 0.64 m/s    RVOT peak VTI 15.5 cm    Mr max juan 5.12 m/s    LVOT stroke volume 67.92 cm3    AV peak gradient 7 mmHg    MV peak gradient 4 mmHg    TV resting pulmonary artery pressure 26 mmHg    PV mean gradient 0.79 mmHg    E/E' ratio 18.67 m/s    MV Peak E Juan 0.56 m/s    TR Max Juan 2.13 m/s    MV VTI 26.4 cm    MV stenosis pressure 1/2 time 59.97 ms    MV Peak A Juan 0.86 m/s    PV Peak S Juan 0.43 m/s    PV Peak D Juan 0.27 m/s    LV Systolic Volume 193.50 mL    LV Systolic Volume Index 79.6 mL/m2    LV Diastolic Volume 249.30 mL    LV Diastolic Volume Index 102.59 mL/m2    LA Volume Index 50.6 mL/m2    LV Mass Index 203  g/m2    RA Major Axis 4.58 cm    Left Atrium Minor Axis 5.94 cm    Left Atrium Major Axis 5.97 cm    Triscuspid Valve Regurgitation Peak Gradient 18 mmHg    LA Volume Index (Mod) 41.2 mL/m2    LA volume (mod) 100.00 cm3    RA Width 4.50 cm    EF 20 %    Narrative    · The left ventricle is moderately enlarged with eccentric hypertrophy and   severely decreased systolic function.  · Severe left atrial enlargement.  · Intermediate central venous pressure (8 mmHg).  · The estimated PA systolic pressure is 26 mmHg.  · The estimated ejection fraction is 20%.  · Grade II left ventricular diastolic dysfunction.  · There is left ventricular global hypokinesis.  · Normal right ventricular size with normal right ventricular systolic   function.  · Mild right atrial enlargement.  · Mild-to-moderate mitral regurgitation.  · Mild tricuspid regurgitation.          Radiology:  X-Ray Chest AP Portable    Result Date: 12/20/2023  EXAMINATION: XR CHEST AP PORTABLE CLINICAL HISTORY: Chest pain, unspecified TECHNIQUE: Single frontal view of the chest was performed. COMPARISON: Prior dated 10/13/2023 FINDINGS: The mediastinal structures are midline.  The cardiac silhouette is enlarged and stable.  There is a dual lead left-sided cardiac pacing device with intact leads.  There is prominence of central pulmonary vessels without definite interstitial edema.     1. Stable enlarged cardiac silhouette. 2. Prominence of central pulmonary vessels without definite interstitial edema. Electronically signed by: Christine Mcadams MD Date:    12/20/2023 Time:    16:48    X-Ray Lumbar Complete Including Flex And Ext    Result Date: 11/29/2023  EXAMINATION: XR LUMBAR SPINE 5 VIEW WITH FLEX AND EXT CLINICAL HISTORY: Radiculopathy, lumbar region TECHNIQUE: Five views of the lumbar spine plus flexion extension views were performed. COMPARISON: None. FINDINGS: Vertebral bodies are intact.  There is mild narrowing of the L4-L5 disc space with a little  forward subluxation of L4 on L5 that does not change in flexion and extension.  No collapse or destruction is seen.  Hip prosthesis is in place on the left.     See above Electronically signed by: Javier Christensen MD Date:    11/29/2023 Time:    12:39        Current Medications:     Infusions:       Scheduled:   amiodarone  400 mg Oral Daily    apixaban  5 mg Oral BID    atorvastatin  40 mg Oral Daily    carvediloL  25 mg Oral BID    digoxin  250 mcg Oral Daily    empagliflozin  10 mg Oral Daily    furosemide  40 mg Oral Daily    gabapentin  300 mg Oral TID    ketorolac  30 mg Intravenous Once    losartan  50 mg Oral Daily    tamsulosin  0.4 mg Oral Daily        PRN:  acetaminophen, ALPRAZolam, aluminum-magnesium hydroxide-simethicone **AND** LIDOcaine viscous HCl 2%, diphenhydrAMINE, HYDROcodone-acetaminophen, ketorolac, meclizine, melatonin, morphine, ondansetron, prochlorperazine, sodium chloride 0.9%      Assessment and Plan:  Atypical chest pain.  Chronically low-level nonspecific abnormal troponin likely from abnormal creatinine.  Not ACS.  Normal coronary angiogram in 2019.  -discussed that his chest pain is likely musculoskeletal and also reproducible on exam.  Will give him Toradol for pain.  Reassured that his troponin is not due to ACS. Will check digoxin level today since he was scheduled for a dig level next week.  -okay to discharge and keep scheduled appointment for follow-up.    Thank you for allowing me to participate in the care of Franco Cooper.      Senthil Rodríguez MD, F.A.C.C, F.S.C.A.I.    Disclaimer: This document was created using voice recognition software (M*Modal Fluency Direct). Although it may be edited, this document may contain errors related to incorrect recognition of the spoken word. Please call the physician if clarification is needed.

## 2023-12-21 NOTE — ED NOTES
Resumed pt care. Pt currently c/o intermitting mid cp 4/10 non radiating pend Cardiology Consult. -SOB. Denies any other complaints. Safety measures in place. Will continue to monitor.

## 2023-12-21 NOTE — H&P
"[facility] ED Observation Unit  History and Physical      I assumed care of this patient from the Emergency Department at onset of observation time, 9:05 PM on 12/20/2023.       History of Present Illness:  This is a 58 y.o. male with a PMHx of CHF, HTN, chronic low back pain, and AICD who presents with complaint of sharp, midsternal chest pain that began this morning at 10am. He states that he had initially felt the pain at 3am, but it subsided and he went back to sleep. The pain when he woke back up was getting worse throughout the day, and he described it "like a punch" and then soreness. He denies the pain feeling like an electric shock, and denies the pain worsening with positioning. He notes that it feels mildly different from heart burn. He denies doing any new activity this morning, but notes that he was exerting himself more yesterday while mowing the lawn. He also reports chest tightness and pain on deep breaths. He denies any recent sick contact, but notes that he had a cough last week with some mild shortness of breath and dizziness. He denies fever or any recent SOB. He has been placed on a new dosage of Eliquis in the last 2 weeks.      ED Course:  Nonischemic EKG. Labwork notable for mild anemia and DENISSE. 1.5, 1 month ago was 1.3.   Troponin is elevated at 0.068 and BNP is elevated at 292, however no   Evidence of volume overload on CXR or physical exam.  Troponin may represent chronic elevation as 2nd troponin also elevated but flat, however patient still complains of chest pain after treatment with Toradol, GI cocktail, nitro and morphine.  Nitro initially gave patient relief, however dropped his systolic to 90 so subsequent doses were not given. Given risk factors and recent implantation of AICD in the end of November, believe patient should stay for serial troponin and cardiology evaluation thus placed in EDOU.    I reviewed the ED Provider Note dated 12/20/2023 prior to my evaluation of this " patient.  I reviewed all labs and imaging performed in the Main ED, prior to patient being placed in Observation. Patient was placed in the ED Observation Unit for chest pain rule out with elevated trop.    PMHx   Past Medical History:   Diagnosis Date    A-fib     AICD (automatic cardioverter/defibrillator) present     CHF (congestive heart failure)     DDD (degenerative disc disease), lumbar     HTN (hypertension)     Lumbar radiculopathy     Mixed hyperlipidemia     SHERYL (obstructive sleep apnea)     Other cardiomyopathies     TIA (transient ischemic attack)     VT (ventricular tachycardia)       History reviewed. No pertinent surgical history.     Family Hx   History reviewed. No pertinent family history.     Social Hx   Social History     Socioeconomic History    Marital status:    Tobacco Use    Smoking status: Former     Types: Cigars    Smokeless tobacco: Never   Substance and Sexual Activity    Alcohol use: Yes    Drug use: Never    Sexual activity: Not Currently        Vital Signs   Vitals:    12/20/23 1942 12/20/23 2017 12/20/23 2038 12/20/23 2047   BP: (!) 100/57 (!) 113/53  (!) 114/49   Pulse: (!) 50 (!) 56  (!) 54   Resp: 16 18 18 17   Temp: 98 °F (36.7 °C) 98.7 °F (37.1 °C)     TempSrc:       SpO2: 95% 95%  95%   Weight:       Height:        12/20/23 2102   BP: (!) 113/58   Pulse: 61   Resp: 19   Temp: 98.7 °F (37.1 °C)   TempSrc:    SpO2: 95%   Weight:    Height:        Review of Systems  Review of Systems   Constitutional:  Negative for chills, fever and malaise/fatigue.   Respiratory:  Negative for cough and shortness of breath.    Cardiovascular:  Positive for chest pain. Negative for palpitations.   Gastrointestinal:  Negative for abdominal pain, nausea and vomiting.   Genitourinary:  Negative for dysuria.   Musculoskeletal:  Positive for back pain (chronic). Negative for myalgias.   Skin:  Negative for rash.   Neurological:  Negative for dizziness and headaches.   Psychiatric/Behavioral:   Negative for depression and suicidal ideas.        Brief Physical Exam/Reassessment   Physical Exam    Constitutional: He appears well-developed and well-nourished. He is Obese . He is cooperative. He does not have a sickly appearance. No distress.   HENT:   Head: Normocephalic and atraumatic.   Eyes: EOM are normal. Pupils are equal, round, and reactive to light. No scleral icterus.   Neck:   Normal range of motion.  Cardiovascular:  Normal rate, regular rhythm and normal heart sounds.           Pulmonary/Chest: Breath sounds normal. No respiratory distress. He exhibits tenderness.   Abdominal: Abdomen is soft. Bowel sounds are normal. He exhibits no distension. There is no abdominal tenderness.   Musculoskeletal:         General: No tenderness. Normal range of motion.      Cervical back: Normal range of motion.     Neurological: He is alert and oriented to person, place, and time. He has normal strength. No sensory deficit. GCS score is 15. GCS eye subscore is 4. GCS verbal subscore is 5. GCS motor subscore is 6.   Skin: Skin is warm and dry. Capillary refill takes less than 2 seconds.   Psychiatric: He has a normal mood and affect. Thought content normal.         Labs Reviewed   B-TYPE NATRIURETIC PEPTIDE - Abnormal; Notable for the following components:       Result Value     (*)     All other components within normal limits   CBC W/ AUTO DIFFERENTIAL - Abnormal; Notable for the following components:    Hemoglobin 13.1 (*)     RDW 14.9 (*)     All other components within normal limits   COMPREHENSIVE METABOLIC PANEL - Abnormal; Notable for the following components:    Creatinine 1.5 (*)     Calcium 8.4 (*)     Albumin 3.1 (*)     eGFR 54 (*)     All other components within normal limits   TROPONIN I - Abnormal; Notable for the following components:    Troponin I 0.064 (*)     All other components within normal limits   TROPONIN I - Abnormal; Notable for the following components:    Troponin I 0.066 (*)      All other components within normal limits   HIV 1 / 2 ANTIBODY    Narrative:     Release to patient->Immediate   HEPATITIS C ANTIBODY    Narrative:     Release to patient->Immediate   TROPONIN I     X-Ray Chest AP Portable   Final Result      1. Stable enlarged cardiac silhouette.   2. Prominence of central pulmonary vessels without definite interstitial edema.         Electronically signed by: Christine Mcadams MD   Date:    12/20/2023   Time:    16:48            Medications:   Scheduled Meds:   [START ON 12/21/2023] amiodarone  400 mg Oral Daily    apixaban  5 mg Oral BID    aspirin  81 mg Oral Once    [START ON 12/21/2023] atorvastatin  40 mg Oral Daily    carvediloL  25 mg Oral BID    [START ON 12/21/2023] digoxin  250 mcg Oral Daily    [START ON 12/21/2023] empagliflozin  10 mg Oral Daily    [START ON 12/21/2023] furosemide  40 mg Oral Daily    gabapentin  300 mg Oral TID    [START ON 12/21/2023] losartan  50 mg Oral Daily    [START ON 12/21/2023] tamsulosin  0.4 mg Oral Daily     Continuous Infusions:  PRN Meds:.ALPRAZolam, meclizine, melatonin, sodium chloride 0.9%      Assessment/Plan:    1. Elevated troponin    2. Chest pain    -serial troponin  -cardiology consult    Case was discussed with the ED provider, Dr. Williamson

## 2023-12-21 NOTE — DISCHARGE SUMMARY
"[facility] ED Observation Unit  Discharge Summary        History of Present Illness:    This is a 58 y.o. male with CHF, HTN, chronic low back pain, and AICD who presented to the ED on 12/20/21 with complaint of sharp, midsternal chest pain that began this morning at 10am. He states that he had initially felt the pain at 3am, but it subsided and he went back to sleep. The pain when he woke back up was getting worse throughout the day, and he described it "like a punch" and then soreness.  Reports pain is worse with palpation.  He also reports chest tightness and pain on deep breaths. He denies any recent sick contact, but notes that he had a cough last week with some mild shortness of breath and dizziness. He denies fever or any recent SOB. He has been placed on a new dosage of Eliquis in the last 2 weeks.       ED Course:  Nonischemic EKG. Labwork notable for mild anemia and DENISSE. 1.5, 1 month ago was 1.3.   Troponin is elevated at 0.068 and BNP is elevated at 292, however no   Evidence of volume overload on CXR or physical exam.  Troponin may represent chronic elevation as 2nd troponin also elevated but flat, however patient still complains of chest pain after treatment with Toradol, GI cocktail, nitro and morphine.  Nitro initially gave patient relief, however dropped his systolic to 90 so subsequent doses were not given. Given risk factors and recent implantation of AICD in the end of November, believe patient should stay for serial troponin and cardiology evaluation thus placed in EDOU.     Observation Course:    No acute events overnight.  Patient is feeling well today.  Serial troponins with chronic elevation, no significant increased from 2 months ago.    Brief Physical Exam/Reassessment   As per [facility]    Nursing note and vital signs reviewed.  Appearance: No acute distress.  Eyes: No conjunctival injection.  Chest/ Respiratory: No respiratory distress. No accessory muscle use.  Cardiovascular: Regular rate " and rhythm.  No murmurs. No gallops. No rubs.  Abdomen: Soft.  Not distended.  Nontender.  No guarding.  No rebound. Non-peritoneal.  Musculoskeletal: Good range of motion all joints.  No deformities.  Neck supple.  No meningismus.  Skin: No rashes seen.  Good turgor.  No abrasions.  No ecchymoses.  Neurologic: Motor intact.  Sensation intact  Mental Status:  Alert and oriented x 3.  Appropriate, conversant.     Consultants:    Cardiology-   Suspect musculoskeletal etiology.  Check digoxin level and follow-up on outpatient basis.    ED/OBS Workup:  Vitals:    12/21/23 0603 12/21/23 0957 12/21/23 1114 12/21/23 1329   BP: 121/60   112/60   Pulse: 60 (!) 52 60 60   Resp: 18   20   Temp:    97.6 °F (36.4 °C)   TempSrc:    Oral   SpO2: 96%   97%   Weight:       Height:        12/21/23 1511   BP:    Pulse: (!) 59   Resp:    Temp:    TempSrc:    SpO2:    Weight:    Height:        Labs Reviewed   B-TYPE NATRIURETIC PEPTIDE - Abnormal; Notable for the following components:       Result Value     (*)     All other components within normal limits   CBC W/ AUTO DIFFERENTIAL - Abnormal; Notable for the following components:    Hemoglobin 13.1 (*)     RDW 14.9 (*)     All other components within normal limits   COMPREHENSIVE METABOLIC PANEL - Abnormal; Notable for the following components:    Creatinine 1.5 (*)     Calcium 8.4 (*)     Albumin 3.1 (*)     eGFR 54 (*)     All other components within normal limits   TROPONIN I - Abnormal; Notable for the following components:    Troponin I 0.064 (*)     All other components within normal limits   TROPONIN I - Abnormal; Notable for the following components:    Troponin I 0.066 (*)     All other components within normal limits   TROPONIN I - Abnormal; Notable for the following components:    Troponin I 0.061 (*)     All other components within normal limits   CBC W/ AUTO DIFFERENTIAL - Abnormal; Notable for the following components:    RBC 4.42 (*)     Hemoglobin 12.1 (*)      Hematocrit 37.5 (*)     RDW 15.4 (*)     All other components within normal limits   TROPONIN I - Abnormal; Notable for the following components:    Troponin I 0.045 (*)     All other components within normal limits   COMPREHENSIVE METABOLIC PANEL - Abnormal; Notable for the following components:    BUN 22 (*)     Calcium 8.3 (*)     Albumin 2.8 (*)     eGFR 59 (*)     All other components within normal limits   HIV 1 / 2 ANTIBODY    Narrative:     Release to patient->Immediate   HEPATITIS C ANTIBODY    Narrative:     Release to patient->Immediate   DIGOXIN LEVEL       X-Ray Chest AP Portable   Final Result      1. Stable enlarged cardiac silhouette.   2. Prominence of central pulmonary vessels without definite interstitial edema.         Electronically signed by: Christine Mcadams MD   Date:    12/20/2023   Time:    16:48          Final Diagnosis:  1. Elevated troponin    2. Chest pain    3. Nonischemic cardiomyopathy        Discharge Condition: Stable    Disposition: Home or Self Care     Time spent on the discharge of the patient including review of hospital course with the patient. reviewing discharge medications and arranging follow-up care 35 minutes.  Patient was seen and examined on the date of discharge and determined to be suitable for discharge.    Follow Up:   ED Disposition Condition    Discharge Stable            ED Prescriptions    None       Follow-up Information       Follow up With Specialties Details Why Contact Info    [facility] - Emergency Dept Emergency Medicine  If symptoms worsen [id number] [facility] [id number] [phone]            Future Appointments   Date Time Provider Department Center   12/27/2023  8:30 AM LAB, [facility] PRMCARE [facility] Family   1/29/2024 10:45 AM Db Gomez MD [facility] PAINM[facility]   1/30/2024  9:40 AM Senthil Rodríguez MD [facility] CARDIO [facility]

## 2023-12-21 NOTE — ED NOTES
Report received from JU Vaughan. Pt asleep in bed. No distress noted at present. RR unlabored. VSS. Updated on POC

## 2024-01-01 ENCOUNTER — HOSPITAL ENCOUNTER (OUTPATIENT)
Dept: CARDIOLOGY | Facility: HOSPITAL | Age: 59
Discharge: HOME | End: 2024-03-20
Attending: INTERNAL MEDICINE | Admitting: INTERNAL MEDICINE
Payer: COMMERCIAL

## 2024-01-01 ENCOUNTER — HOSPITAL ENCOUNTER (INPATIENT)
Facility: HOSPITAL | Age: 59
LOS: 5 days | Discharge: HOME | DRG: 286 | End: 2024-04-01
Attending: EMERGENCY MEDICINE | Admitting: STUDENT IN AN ORGANIZED HEALTH CARE EDUCATION/TRAINING PROGRAM
Payer: COMMERCIAL

## 2024-01-01 ENCOUNTER — LAB VISIT (OUTPATIENT)
Dept: LAB | Facility: OTHER | Age: 59
End: 2024-01-01
Attending: INTERNAL MEDICINE
Payer: COMMERCIAL

## 2024-01-01 ENCOUNTER — OFFICE VISIT (OUTPATIENT)
Dept: CARDIOLOGY | Facility: CLINIC | Age: 59
End: 2024-01-01
Payer: COMMERCIAL

## 2024-01-01 ENCOUNTER — DOCUMENTATION ONLY (OUTPATIENT)
Dept: ELECTROPHYSIOLOGY | Facility: CLINIC | Age: 59
End: 2024-01-01
Payer: COMMERCIAL

## 2024-01-01 ENCOUNTER — HOSPITAL ENCOUNTER (OUTPATIENT)
Facility: HOSPITAL | Age: 59
Discharge: HOME | End: 2024-03-20
Attending: INTERNAL MEDICINE | Admitting: INTERNAL MEDICINE
Payer: COMMERCIAL

## 2024-01-01 ENCOUNTER — TELEPHONE (OUTPATIENT)
Dept: CARDIOLOGY | Facility: CLINIC | Age: 59
End: 2024-01-01
Payer: COMMERCIAL

## 2024-01-01 ENCOUNTER — ANESTHESIA EVENT (OUTPATIENT)
Dept: MEDSURG UNIT | Facility: HOSPITAL | Age: 59
End: 2024-01-01
Payer: COMMERCIAL

## 2024-01-01 ENCOUNTER — ANESTHESIA (OUTPATIENT)
Dept: MEDSURG UNIT | Facility: HOSPITAL | Age: 59
End: 2024-01-01
Payer: COMMERCIAL

## 2024-01-01 ENCOUNTER — TELEPHONE (OUTPATIENT)
Dept: TRANSPLANT | Facility: CLINIC | Age: 59
End: 2024-01-01
Payer: COMMERCIAL

## 2024-01-01 VITALS
SYSTOLIC BLOOD PRESSURE: 89 MMHG | RESPIRATION RATE: 18 BRPM | WEIGHT: 259.06 LBS | OXYGEN SATURATION: 97 % | HEIGHT: 70 IN | HEART RATE: 66 BPM | TEMPERATURE: 96 F | DIASTOLIC BLOOD PRESSURE: 53 MMHG | BODY MASS INDEX: 37.09 KG/M2

## 2024-01-01 VITALS
SYSTOLIC BLOOD PRESSURE: 98 MMHG | OXYGEN SATURATION: 98 % | HEART RATE: 77 BPM | BODY MASS INDEX: 39.07 KG/M2 | DIASTOLIC BLOOD PRESSURE: 60 MMHG | WEIGHT: 272.25 LBS

## 2024-01-01 VITALS
SYSTOLIC BLOOD PRESSURE: 99 MMHG | HEART RATE: 63 BPM | BODY MASS INDEX: 38.65 KG/M2 | RESPIRATION RATE: 17 BRPM | WEIGHT: 270 LBS | OXYGEN SATURATION: 99 % | DIASTOLIC BLOOD PRESSURE: 53 MMHG | TEMPERATURE: 98 F | HEIGHT: 70 IN

## 2024-01-01 VITALS
OXYGEN SATURATION: 98 % | SYSTOLIC BLOOD PRESSURE: 114 MMHG | HEART RATE: 81 BPM | DIASTOLIC BLOOD PRESSURE: 74 MMHG | BODY MASS INDEX: 41.17 KG/M2 | WEIGHT: 286.94 LBS

## 2024-01-01 DIAGNOSIS — B96.89: ICD-10-CM

## 2024-01-01 DIAGNOSIS — I10 ESSENTIAL HYPERTENSION: ICD-10-CM

## 2024-01-01 DIAGNOSIS — E78.2 MIXED HYPERLIPIDEMIA: ICD-10-CM

## 2024-01-01 DIAGNOSIS — I42.8 NONISCHEMIC CARDIOMYOPATHY: ICD-10-CM

## 2024-01-01 DIAGNOSIS — I10 ESSENTIAL HYPERTENSION: Primary | ICD-10-CM

## 2024-01-01 DIAGNOSIS — I51.89: ICD-10-CM

## 2024-01-01 DIAGNOSIS — I48.0 PAROXYSMAL ATRIAL FIBRILLATION: ICD-10-CM

## 2024-01-01 DIAGNOSIS — R78.81 BACTEREMIA: ICD-10-CM

## 2024-01-01 DIAGNOSIS — I34.0 NONRHEUMATIC MITRAL VALVE REGURGITATION: ICD-10-CM

## 2024-01-01 DIAGNOSIS — I47.20 VT (VENTRICULAR TACHYCARDIA): ICD-10-CM

## 2024-01-01 DIAGNOSIS — E66.01 MORBID OBESITY WITH BMI OF 40.0-44.9, ADULT: ICD-10-CM

## 2024-01-01 DIAGNOSIS — I50.43 ACUTE ON CHRONIC COMBINED SYSTOLIC (CONGESTIVE) AND DIASTOLIC (CONGESTIVE) HEART FAILURE: ICD-10-CM

## 2024-01-01 DIAGNOSIS — M54.16 LUMBAR RADICULOPATHY: ICD-10-CM

## 2024-01-01 DIAGNOSIS — Z01.810 PREOP CARDIOVASCULAR EXAM: ICD-10-CM

## 2024-01-01 DIAGNOSIS — I34.0 SEVERE MITRAL REGURGITATION: ICD-10-CM

## 2024-01-01 DIAGNOSIS — I42.8 OTHER CARDIOMYOPATHIES: ICD-10-CM

## 2024-01-01 DIAGNOSIS — Z95.810 AUTOMATIC IMPLANTABLE CARDIOVERTER-DEFIBRILLATOR IN SITU: Primary | ICD-10-CM

## 2024-01-01 DIAGNOSIS — I34.0 NONRHEUMATIC MITRAL VALVE REGURGITATION: Primary | ICD-10-CM

## 2024-01-01 DIAGNOSIS — I50.43 ACUTE ON CHRONIC COMBINED SYSTOLIC AND DIASTOLIC CONGESTIVE HEART FAILURE: Chronic | ICD-10-CM

## 2024-01-01 DIAGNOSIS — I42.8 NONISCHEMIC CARDIOMYOPATHY: Primary | ICD-10-CM

## 2024-01-01 DIAGNOSIS — R06.02 SOB (SHORTNESS OF BREATH): Primary | ICD-10-CM

## 2024-01-01 DIAGNOSIS — I34.0 MITRAL VALVE INSUFFICIENCY: ICD-10-CM

## 2024-01-01 DIAGNOSIS — I50.9 CONGESTIVE HEART FAILURE, UNSPECIFIED HF CHRONICITY, UNSPECIFIED HEART FAILURE TYPE: Primary | ICD-10-CM

## 2024-01-01 DIAGNOSIS — M51.36 DDD (DEGENERATIVE DISC DISEASE), LUMBAR: ICD-10-CM

## 2024-01-01 DIAGNOSIS — R42 DIZZINESS: ICD-10-CM

## 2024-01-01 DIAGNOSIS — R07.9 CHEST PAIN: Primary | ICD-10-CM

## 2024-01-01 DIAGNOSIS — I34.0 MITRAL VALVE INSUFFICIENCY, UNSPECIFIED ETIOLOGY: ICD-10-CM

## 2024-01-01 DIAGNOSIS — I48.0 PAF (PAROXYSMAL ATRIAL FIBRILLATION): ICD-10-CM

## 2024-01-01 DIAGNOSIS — I50.42 CHRONIC COMBINED SYSTOLIC AND DIASTOLIC HEART FAILURE: ICD-10-CM

## 2024-01-01 LAB
ABO + RH BLD: NORMAL
ALBUMIN SERPL BCP-MCNC: 2.5 G/DL (ref 3.5–5.2)
ALBUMIN SERPL BCP-MCNC: 2.7 G/DL (ref 3.5–5.2)
ALBUMIN SERPL BCP-MCNC: 2.7 G/DL (ref 3.5–5.2)
ALBUMIN SERPL BCP-MCNC: 2.9 G/DL (ref 3.5–5.2)
ALP SERPL-CCNC: 79 U/L (ref 55–135)
ALP SERPL-CCNC: 86 U/L (ref 55–135)
ALP SERPL-CCNC: 88 U/L (ref 55–135)
ALP SERPL-CCNC: 90 U/L (ref 55–135)
ALT SERPL W/O P-5'-P-CCNC: 24 U/L (ref 10–44)
ALT SERPL W/O P-5'-P-CCNC: 26 U/L (ref 10–44)
ALT SERPL W/O P-5'-P-CCNC: 26 U/L (ref 10–44)
ALT SERPL W/O P-5'-P-CCNC: 28 U/L (ref 10–44)
ANION GAP SERPL CALC-SCNC: 10 MMOL/L (ref 8–16)
ANION GAP SERPL CALC-SCNC: 6 MMOL/L (ref 8–16)
ANION GAP SERPL CALC-SCNC: 7 MMOL/L (ref 8–16)
ANION GAP SERPL CALC-SCNC: 8 MMOL/L (ref 8–16)
ANION GAP SERPL CALC-SCNC: 9 MMOL/L (ref 8–16)
APTT PPP: 26.5 SEC (ref 21–32)
ASCENDING AORTA: 3.5 CM
AST SERPL-CCNC: 21 U/L (ref 10–40)
AST SERPL-CCNC: 23 U/L (ref 10–40)
AST SERPL-CCNC: 23 U/L (ref 10–40)
AST SERPL-CCNC: 27 U/L (ref 10–40)
BACTERIA BLD CULT: NORMAL
BASOPHILS # BLD AUTO: 0.03 K/UL (ref 0–0.2)
BASOPHILS # BLD AUTO: 0.04 K/UL (ref 0–0.2)
BASOPHILS # BLD AUTO: 0.06 K/UL (ref 0–0.2)
BASOPHILS NFR BLD: 0.4 % (ref 0–1.9)
BASOPHILS NFR BLD: 0.6 % (ref 0–1.9)
BASOPHILS NFR BLD: 0.8 % (ref 0–1.9)
BILIRUB SERPL-MCNC: 0.5 MG/DL (ref 0.1–1)
BILIRUB SERPL-MCNC: 0.5 MG/DL (ref 0.1–1)
BILIRUB SERPL-MCNC: 0.7 MG/DL (ref 0.1–1)
BILIRUB SERPL-MCNC: 0.8 MG/DL (ref 0.1–1)
BLD GP AB SCN CELLS X3 SERPL QL: NORMAL
BNP SERPL-MCNC: 545 PG/ML (ref 0–99)
BNP SERPL-MCNC: 715 PG/ML (ref 0–99)
BSA FOR ECHO PROCEDURE: 2.46 M2
BUN SERPL-MCNC: 19 MG/DL (ref 6–20)
BUN SERPL-MCNC: 20 MG/DL (ref 6–20)
BUN SERPL-MCNC: 21 MG/DL (ref 6–20)
BUN SERPL-MCNC: 23 MG/DL (ref 6–20)
BUN SERPL-MCNC: 23 MG/DL (ref 6–20)
CALCIUM SERPL-MCNC: 8.4 MG/DL (ref 8.7–10.5)
CALCIUM SERPL-MCNC: 8.7 MG/DL (ref 8.7–10.5)
CALCIUM SERPL-MCNC: 8.7 MG/DL (ref 8.7–10.5)
CALCIUM SERPL-MCNC: 8.8 MG/DL (ref 8.7–10.5)
CALCIUM SERPL-MCNC: 9.1 MG/DL (ref 8.7–10.5)
CHLORIDE SERPL-SCNC: 102 MMOL/L (ref 95–110)
CHLORIDE SERPL-SCNC: 103 MMOL/L (ref 95–110)
CHLORIDE SERPL-SCNC: 105 MMOL/L (ref 95–110)
CHLORIDE SERPL-SCNC: 105 MMOL/L (ref 95–110)
CHLORIDE SERPL-SCNC: 108 MMOL/L (ref 95–110)
CO2 SERPL-SCNC: 26 MMOL/L (ref 23–29)
CO2 SERPL-SCNC: 26 MMOL/L (ref 23–29)
CO2 SERPL-SCNC: 27 MMOL/L (ref 23–29)
CO2 SERPL-SCNC: 29 MMOL/L (ref 23–29)
CO2 SERPL-SCNC: 31 MMOL/L (ref 23–29)
CREAT SERPL-MCNC: 1 MG/DL (ref 0.5–1.4)
CREAT SERPL-MCNC: 1.1 MG/DL (ref 0.5–1.4)
CREAT SERPL-MCNC: 1.2 MG/DL (ref 0.5–1.4)
DIFFERENTIAL METHOD BLD: ABNORMAL
DIGOXIN SERPL-MCNC: 1.1 NG/ML (ref 0.8–2)
DIGOXIN SERPL-MCNC: 1.1 NG/ML (ref 0.8–2)
DIGOXIN SERPL-MCNC: 1.5 NG/ML (ref 0.8–2)
EOSINOPHIL # BLD AUTO: 0.1 K/UL (ref 0–0.5)
EOSINOPHIL # BLD AUTO: 0.2 K/UL (ref 0–0.5)
EOSINOPHIL # BLD AUTO: 0.2 K/UL (ref 0–0.5)
EOSINOPHIL NFR BLD: 1.9 % (ref 0–8)
EOSINOPHIL NFR BLD: 2.5 % (ref 0–8)
EOSINOPHIL NFR BLD: 2.5 % (ref 0–8)
ERYTHROCYTE [DISTWIDTH] IN BLOOD BY AUTOMATED COUNT: 14.2 % (ref 11.5–14.5)
ERYTHROCYTE [DISTWIDTH] IN BLOOD BY AUTOMATED COUNT: 14.2 % (ref 11.5–14.5)
ERYTHROCYTE [DISTWIDTH] IN BLOOD BY AUTOMATED COUNT: 14.3 % (ref 11.5–14.5)
EST. GFR  (NO RACE VARIABLE): >60 ML/MIN/1.73 M^2
GLUCOSE SERPL-MCNC: 101 MG/DL (ref 70–110)
GLUCOSE SERPL-MCNC: 103 MG/DL (ref 70–110)
GLUCOSE SERPL-MCNC: 104 MG/DL (ref 70–110)
GLUCOSE SERPL-MCNC: 89 MG/DL (ref 70–110)
GLUCOSE SERPL-MCNC: 96 MG/DL (ref 70–110)
HCT VFR BLD AUTO: 38.4 % (ref 40–54)
HCT VFR BLD AUTO: 39.1 % (ref 40–54)
HCT VFR BLD AUTO: 39.4 % (ref 40–54)
HGB BLD-MCNC: 12.1 G/DL (ref 14–18)
HGB BLD-MCNC: 12.4 G/DL (ref 14–18)
HGB BLD-MCNC: 12.5 G/DL (ref 14–18)
HR MV ECHO: 72 BPM
IMM GRANULOCYTES # BLD AUTO: 0.01 K/UL (ref 0–0.04)
IMM GRANULOCYTES # BLD AUTO: 0.02 K/UL (ref 0–0.04)
IMM GRANULOCYTES # BLD AUTO: 0.03 K/UL (ref 0–0.04)
IMM GRANULOCYTES NFR BLD AUTO: 0.2 % (ref 0–0.5)
IMM GRANULOCYTES NFR BLD AUTO: 0.3 % (ref 0–0.5)
IMM GRANULOCYTES NFR BLD AUTO: 0.4 % (ref 0–0.5)
INR PPP: 1.1 (ref 0.8–1.2)
LAA PV: 50 CM/S
LACTATE SERPL-SCNC: 1.1 MMOL/L (ref 0.5–2.2)
LEFT VENTRICULAR INTERNAL DIMENSION IN DIASTOLE: 8.5 CM (ref 3.5–6)
LYMPHOCYTES # BLD AUTO: 1.9 K/UL (ref 1–4.8)
LYMPHOCYTES # BLD AUTO: 2.1 K/UL (ref 1–4.8)
LYMPHOCYTES # BLD AUTO: 2.6 K/UL (ref 1–4.8)
LYMPHOCYTES NFR BLD: 26.5 % (ref 18–48)
LYMPHOCYTES NFR BLD: 32.5 % (ref 18–48)
LYMPHOCYTES NFR BLD: 33.1 % (ref 18–48)
MAGNESIUM SERPL-MCNC: 1.9 MG/DL (ref 1.6–2.6)
MAGNESIUM SERPL-MCNC: 2 MG/DL (ref 1.6–2.6)
MAGNESIUM SERPL-MCNC: 2.1 MG/DL (ref 1.6–2.6)
MAGNESIUM SERPL-MCNC: 2.1 MG/DL (ref 1.6–2.6)
MCH RBC QN AUTO: 25.7 PG (ref 27–31)
MCH RBC QN AUTO: 26.2 PG (ref 27–31)
MCH RBC QN AUTO: 26.3 PG (ref 27–31)
MCHC RBC AUTO-ENTMCNC: 31.5 G/DL (ref 32–36)
MCHC RBC AUTO-ENTMCNC: 31.7 G/DL (ref 32–36)
MCHC RBC AUTO-ENTMCNC: 31.7 G/DL (ref 32–36)
MCV RBC AUTO: 81 FL (ref 82–98)
MCV RBC AUTO: 83 FL (ref 82–98)
MCV RBC AUTO: 83 FL (ref 82–98)
MONOCYTES # BLD AUTO: 0.8 K/UL (ref 0.3–1)
MONOCYTES # BLD AUTO: 1.1 K/UL (ref 0.3–1)
MONOCYTES # BLD AUTO: 1.1 K/UL (ref 0.3–1)
MONOCYTES NFR BLD: 12.5 % (ref 4–15)
MONOCYTES NFR BLD: 13.4 % (ref 4–15)
MONOCYTES NFR BLD: 14.4 % (ref 4–15)
MV MEAN GRADIENT: 1 MMHG
NEUTROPHILS # BLD AUTO: 3.3 K/UL (ref 1.8–7.7)
NEUTROPHILS # BLD AUTO: 4 K/UL (ref 1.8–7.7)
NEUTROPHILS # BLD AUTO: 4.1 K/UL (ref 1.8–7.7)
NEUTROPHILS NFR BLD: 50.4 % (ref 38–73)
NEUTROPHILS NFR BLD: 51.1 % (ref 38–73)
NEUTROPHILS NFR BLD: 56.5 % (ref 38–73)
NRBC BLD-RTO: 0 /100 WBC
OHS QRS DURATION: 186 MS
OHS QRS DURATION: 190 MS
OHS QTC CALCULATION: 481 MS
OHS QTC CALCULATION: 486 MS
PHOSPHATE SERPL-MCNC: 3.3 MG/DL (ref 2.7–4.5)
PHOSPHATE SERPL-MCNC: 4 MG/DL (ref 2.7–4.5)
PHOSPHATE SERPL-MCNC: 4.1 MG/DL (ref 2.7–4.5)
PHOSPHATE SERPL-MCNC: 4.2 MG/DL (ref 2.7–4.5)
PLATELET # BLD AUTO: 254 K/UL (ref 150–450)
PLATELET # BLD AUTO: 272 K/UL (ref 150–450)
PLATELET # BLD AUTO: 274 K/UL (ref 150–450)
PMV BLD AUTO: 10.3 FL (ref 9.2–12.9)
PMV BLD AUTO: 9.8 FL (ref 9.2–12.9)
PMV BLD AUTO: 9.8 FL (ref 9.2–12.9)
POC CARDIAC TROPONIN I: 0.04 NG/ML (ref 0–0.08)
POCT GLUCOSE: 93 MG/DL (ref 70–110)
POTASSIUM SERPL-SCNC: 3.6 MMOL/L (ref 3.5–5.1)
POTASSIUM SERPL-SCNC: 4 MMOL/L (ref 3.5–5.1)
POTASSIUM SERPL-SCNC: 4.1 MMOL/L (ref 3.5–5.1)
PROT SERPL-MCNC: 6.4 G/DL (ref 6–8.4)
PROT SERPL-MCNC: 6.4 G/DL (ref 6–8.4)
PROT SERPL-MCNC: 6.9 G/DL (ref 6–8.4)
PROT SERPL-MCNC: 6.9 G/DL (ref 6–8.4)
PROTHROMBIN TIME: 11.7 SEC (ref 9–12.5)
RBC # BLD AUTO: 4.62 M/UL (ref 4.6–6.2)
RBC # BLD AUTO: 4.76 M/UL (ref 4.6–6.2)
RBC # BLD AUTO: 4.83 M/UL (ref 4.6–6.2)
SAMPLE: NORMAL
SINUS: 3.2 CM
SODIUM SERPL-SCNC: 137 MMOL/L (ref 136–145)
SODIUM SERPL-SCNC: 140 MMOL/L (ref 136–145)
SODIUM SERPL-SCNC: 141 MMOL/L (ref 136–145)
SODIUM SERPL-SCNC: 142 MMOL/L (ref 136–145)
SODIUM SERPL-SCNC: 142 MMOL/L (ref 136–145)
SPECIMEN OUTDATE: NORMAL
STJ: 3 CM
TROPONIN I SERPL DL<=0.01 NG/ML-MCNC: 0.04 NG/ML (ref 0–0.03)
TROPONIN I SERPL DL<=0.01 NG/ML-MCNC: 0.05 NG/ML (ref 0–0.03)
WBC # BLD AUTO: 6.46 K/UL (ref 3.9–12.7)
WBC # BLD AUTO: 7.31 K/UL (ref 3.9–12.7)
WBC # BLD AUTO: 7.97 K/UL (ref 3.9–12.7)
Z-SCORE OF LEFT VENTRICULAR DIMENSION IN END DIASTOLE: -1.87

## 2024-01-01 PROCEDURE — 99223 1ST HOSP IP/OBS HIGH 75: CPT | Mod: GC,,, | Performed by: INTERNAL MEDICINE

## 2024-01-01 PROCEDURE — 36415 COLL VENOUS BLD VENIPUNCTURE: CPT | Performed by: STUDENT IN AN ORGANIZED HEALTH CARE EDUCATION/TRAINING PROGRAM

## 2024-01-01 PROCEDURE — 25000003 PHARM REV CODE 250: Performed by: STUDENT IN AN ORGANIZED HEALTH CARE EDUCATION/TRAINING PROGRAM

## 2024-01-01 PROCEDURE — 83880 ASSAY OF NATRIURETIC PEPTIDE: CPT | Performed by: INTERNAL MEDICINE

## 2024-01-01 PROCEDURE — 93010 ELECTROCARDIOGRAM REPORT: CPT | Mod: ,,, | Performed by: INTERNAL MEDICINE

## 2024-01-01 PROCEDURE — 36415 COLL VENOUS BLD VENIPUNCTURE: CPT | Mod: XB | Performed by: STUDENT IN AN ORGANIZED HEALTH CARE EDUCATION/TRAINING PROGRAM

## 2024-01-01 PROCEDURE — 84100 ASSAY OF PHOSPHORUS: CPT | Performed by: HOSPITALIST

## 2024-01-01 PROCEDURE — 85025 COMPLETE CBC W/AUTO DIFF WBC: CPT | Performed by: STUDENT IN AN ORGANIZED HEALTH CARE EDUCATION/TRAINING PROGRAM

## 2024-01-01 PROCEDURE — 87040 BLOOD CULTURE FOR BACTERIA: CPT | Performed by: INTERNAL MEDICINE

## 2024-01-01 PROCEDURE — 99499 UNLISTED E&M SERVICE: CPT | Mod: S$GLB,,, | Performed by: INTERNAL MEDICINE

## 2024-01-01 PROCEDURE — 83605 ASSAY OF LACTIC ACID: CPT | Performed by: HOSPITALIST

## 2024-01-01 PROCEDURE — 63600175 PHARM REV CODE 636 W HCPCS: Performed by: EMERGENCY MEDICINE

## 2024-01-01 PROCEDURE — 20600001 HC STEP DOWN PRIVATE ROOM

## 2024-01-01 PROCEDURE — 25000242 PHARM REV CODE 250 ALT 637 W/ HCPCS: Performed by: HOSPITALIST

## 2024-01-01 PROCEDURE — 37000008 HC ANESTHESIA 1ST 15 MINUTES

## 2024-01-01 PROCEDURE — 80053 COMPREHEN METABOLIC PANEL: CPT | Performed by: EMERGENCY MEDICINE

## 2024-01-01 PROCEDURE — 37000009 HC ANESTHESIA EA ADD 15 MINS

## 2024-01-01 PROCEDURE — 84484 ASSAY OF TROPONIN QUANT: CPT

## 2024-01-01 PROCEDURE — 84484 ASSAY OF TROPONIN QUANT: CPT | Mod: 91 | Performed by: STUDENT IN AN ORGANIZED HEALTH CARE EDUCATION/TRAINING PROGRAM

## 2024-01-01 PROCEDURE — 99233 SBSQ HOSP IP/OBS HIGH 50: CPT | Mod: ,,, | Performed by: INTERNAL MEDICINE

## 2024-01-01 PROCEDURE — 86850 RBC ANTIBODY SCREEN: CPT | Performed by: STUDENT IN AN ORGANIZED HEALTH CARE EDUCATION/TRAINING PROGRAM

## 2024-01-01 PROCEDURE — 25000003 PHARM REV CODE 250: Performed by: HOSPITALIST

## 2024-01-01 PROCEDURE — 80053 COMPREHEN METABOLIC PANEL: CPT | Performed by: HOSPITALIST

## 2024-01-01 PROCEDURE — B2111ZZ FLUOROSCOPY OF MULTIPLE CORONARY ARTERIES USING LOW OSMOLAR CONTRAST: ICD-10-PCS | Performed by: INTERNAL MEDICINE

## 2024-01-01 PROCEDURE — 99900035 HC TECH TIME PER 15 MIN (STAT)

## 2024-01-01 PROCEDURE — 84484 ASSAY OF TROPONIN QUANT: CPT | Mod: 91 | Performed by: EMERGENCY MEDICINE

## 2024-01-01 PROCEDURE — C1894 INTRO/SHEATH, NON-LASER: HCPCS | Performed by: INTERNAL MEDICINE

## 2024-01-01 PROCEDURE — 99213 OFFICE O/P EST LOW 20 MIN: CPT | Mod: PBBFAC,PN | Performed by: INTERNAL MEDICINE

## 2024-01-01 PROCEDURE — 93454 CORONARY ARTERY ANGIO S&I: CPT | Performed by: INTERNAL MEDICINE

## 2024-01-01 PROCEDURE — 63600175 PHARM REV CODE 636 W HCPCS: Performed by: STUDENT IN AN ORGANIZED HEALTH CARE EDUCATION/TRAINING PROGRAM

## 2024-01-01 PROCEDURE — 83880 ASSAY OF NATRIURETIC PEPTIDE: CPT | Performed by: EMERGENCY MEDICINE

## 2024-01-01 PROCEDURE — 83735 ASSAY OF MAGNESIUM: CPT | Performed by: HOSPITALIST

## 2024-01-01 PROCEDURE — 96374 THER/PROPH/DIAG INJ IV PUSH: CPT

## 2024-01-01 PROCEDURE — 84484 ASSAY OF TROPONIN QUANT: CPT | Performed by: HOSPITALIST

## 2024-01-01 PROCEDURE — 85610 PROTHROMBIN TIME: CPT | Performed by: HOSPITALIST

## 2024-01-01 PROCEDURE — 85730 THROMBOPLASTIN TIME PARTIAL: CPT | Performed by: HOSPITALIST

## 2024-01-01 PROCEDURE — 93005 ELECTROCARDIOGRAM TRACING: CPT

## 2024-01-01 PROCEDURE — 94660 CPAP INITIATION&MGMT: CPT

## 2024-01-01 PROCEDURE — 80162 ASSAY OF DIGOXIN TOTAL: CPT | Performed by: EMERGENCY MEDICINE

## 2024-01-01 PROCEDURE — 99285 EMERGENCY DEPT VISIT HI MDM: CPT | Mod: 25

## 2024-01-01 PROCEDURE — 83735 ASSAY OF MAGNESIUM: CPT | Performed by: STUDENT IN AN ORGANIZED HEALTH CARE EDUCATION/TRAINING PROGRAM

## 2024-01-01 PROCEDURE — 80048 BASIC METABOLIC PNL TOTAL CA: CPT | Performed by: STUDENT IN AN ORGANIZED HEALTH CARE EDUCATION/TRAINING PROGRAM

## 2024-01-01 PROCEDURE — 94761 N-INVAS EAR/PLS OXIMETRY MLT: CPT

## 2024-01-01 PROCEDURE — 36415 COLL VENOUS BLD VENIPUNCTURE: CPT | Performed by: HOSPITALIST

## 2024-01-01 PROCEDURE — 99214 OFFICE O/P EST MOD 30 MIN: CPT | Mod: S$GLB,,, | Performed by: INTERNAL MEDICINE

## 2024-01-01 PROCEDURE — 93454 CORONARY ARTERY ANGIO S&I: CPT | Mod: 26,,, | Performed by: INTERNAL MEDICINE

## 2024-01-01 PROCEDURE — 93283 PRGRMG EVAL IMPLANTABLE DFB: CPT | Mod: PBBFAC | Performed by: INTERNAL MEDICINE

## 2024-01-01 PROCEDURE — 99152 MOD SED SAME PHYS/QHP 5/>YRS: CPT | Performed by: INTERNAL MEDICINE

## 2024-01-01 PROCEDURE — 99999 PR PBB SHADOW E&M-EST. PATIENT-LVL III: CPT | Mod: PBBFAC,,, | Performed by: INTERNAL MEDICINE

## 2024-01-01 PROCEDURE — 25000003 PHARM REV CODE 250: Performed by: INTERNAL MEDICINE

## 2024-01-01 PROCEDURE — 99215 OFFICE O/P EST HI 40 MIN: CPT | Mod: S$GLB,,, | Performed by: INTERNAL MEDICINE

## 2024-01-01 PROCEDURE — 80162 ASSAY OF DIGOXIN TOTAL: CPT | Performed by: HOSPITALIST

## 2024-01-01 PROCEDURE — 12000002 HC ACUTE/MED SURGE SEMI-PRIVATE ROOM

## 2024-01-01 PROCEDURE — 93010 ELECTROCARDIOGRAM REPORT: CPT | Mod: 76,,, | Performed by: INTERNAL MEDICINE

## 2024-01-01 PROCEDURE — D9220A PRA ANESTHESIA: Mod: CRNA,,, | Performed by: STUDENT IN AN ORGANIZED HEALTH CARE EDUCATION/TRAINING PROGRAM

## 2024-01-01 PROCEDURE — 36415 COLL VENOUS BLD VENIPUNCTURE: CPT | Performed by: INTERNAL MEDICINE

## 2024-01-01 PROCEDURE — 80162 ASSAY OF DIGOXIN TOTAL: CPT | Performed by: STUDENT IN AN ORGANIZED HEALTH CARE EDUCATION/TRAINING PROGRAM

## 2024-01-01 PROCEDURE — 84100 ASSAY OF PHOSPHORUS: CPT | Performed by: STUDENT IN AN ORGANIZED HEALTH CARE EDUCATION/TRAINING PROGRAM

## 2024-01-01 PROCEDURE — C1887 CATHETER, GUIDING: HCPCS | Performed by: INTERNAL MEDICINE

## 2024-01-01 PROCEDURE — 99213 OFFICE O/P EST LOW 20 MIN: CPT | Mod: PBBFAC,25 | Performed by: INTERNAL MEDICINE

## 2024-01-01 PROCEDURE — 27100171 HC OXYGEN HIGH FLOW UP TO 24 HOURS

## 2024-01-01 PROCEDURE — D9220A PRA ANESTHESIA: Mod: ANES,,, | Performed by: ANESTHESIOLOGY

## 2024-01-01 PROCEDURE — 93283 PRGRMG EVAL IMPLANTABLE DFB: CPT | Mod: S$GLB,,, | Performed by: INTERNAL MEDICINE

## 2024-01-01 PROCEDURE — 63600175 PHARM REV CODE 636 W HCPCS: Performed by: INTERNAL MEDICINE

## 2024-01-01 PROCEDURE — 25500020 PHARM REV CODE 255: Performed by: INTERNAL MEDICINE

## 2024-01-01 PROCEDURE — 27000190 HC CPAP FULL FACE MASK W/VALVE

## 2024-01-01 PROCEDURE — 85025 COMPLETE CBC W/AUTO DIFF WBC: CPT | Performed by: EMERGENCY MEDICINE

## 2024-01-01 PROCEDURE — C1769 GUIDE WIRE: HCPCS | Performed by: INTERNAL MEDICINE

## 2024-01-01 PROCEDURE — 99152 MOD SED SAME PHYS/QHP 5/>YRS: CPT | Mod: ,,, | Performed by: INTERNAL MEDICINE

## 2024-01-01 RX ORDER — TRAMADOL HYDROCHLORIDE 50 MG/1
50 TABLET ORAL EVERY 8 HOURS PRN
COMMUNITY
Start: 2024-01-01 | End: 2024-01-01 | Stop reason: ALTCHOICE

## 2024-01-01 RX ORDER — FENTANYL CITRATE 50 UG/ML
INJECTION, SOLUTION INTRAMUSCULAR; INTRAVENOUS
Status: DISCONTINUED | OUTPATIENT
Start: 2024-01-01 | End: 2024-01-01 | Stop reason: HOSPADM

## 2024-01-01 RX ORDER — AMIODARONE HYDROCHLORIDE 200 MG/1
400 TABLET ORAL DAILY
Status: DISCONTINUED | OUTPATIENT
Start: 2024-01-01 | End: 2024-01-01 | Stop reason: HOSPADM

## 2024-01-01 RX ORDER — ATORVASTATIN CALCIUM 40 MG/1
40 TABLET, FILM COATED ORAL DAILY
Status: DISCONTINUED | OUTPATIENT
Start: 2024-01-01 | End: 2024-01-01 | Stop reason: HOSPADM

## 2024-01-01 RX ORDER — DIGOXIN 125 MCG
0.12 TABLET ORAL
Status: DISCONTINUED | OUTPATIENT
Start: 2024-01-01 | End: 2024-01-01 | Stop reason: HOSPADM

## 2024-01-01 RX ORDER — POLYETHYLENE GLYCOL 3350 17 G/17G
17 POWDER, FOR SOLUTION ORAL 2 TIMES DAILY PRN
Status: DISCONTINUED | OUTPATIENT
Start: 2024-01-01 | End: 2024-01-01 | Stop reason: HOSPADM

## 2024-01-01 RX ORDER — DIGOXIN 125 MCG
250 TABLET ORAL DAILY
Status: DISCONTINUED | OUTPATIENT
Start: 2024-01-01 | End: 2024-01-01

## 2024-01-01 RX ORDER — GABAPENTIN 300 MG/1
300 CAPSULE ORAL 2 TIMES DAILY
Status: DISCONTINUED | OUTPATIENT
Start: 2024-01-01 | End: 2024-01-01 | Stop reason: HOSPADM

## 2024-01-01 RX ORDER — TALC
6 POWDER (GRAM) TOPICAL NIGHTLY PRN
Status: DISCONTINUED | OUTPATIENT
Start: 2024-01-01 | End: 2024-01-01 | Stop reason: HOSPADM

## 2024-01-01 RX ORDER — SODIUM CHLORIDE 9 MG/ML
INJECTION, SOLUTION INTRAVENOUS CONTINUOUS
Status: ACTIVE | OUTPATIENT
Start: 2024-01-01 | End: 2024-01-01

## 2024-01-01 RX ORDER — ACETAMINOPHEN 325 MG/1
650 TABLET ORAL EVERY 4 HOURS PRN
Status: DISCONTINUED | OUTPATIENT
Start: 2024-01-01 | End: 2024-01-01

## 2024-01-01 RX ORDER — ASPIRIN 81 MG/1
81 TABLET ORAL ONCE
Status: COMPLETED | OUTPATIENT
Start: 2024-01-01 | End: 2024-01-01

## 2024-01-01 RX ORDER — NALOXONE HCL 0.4 MG/ML
0.02 VIAL (ML) INJECTION
Status: DISCONTINUED | OUTPATIENT
Start: 2024-01-01 | End: 2024-01-01 | Stop reason: HOSPADM

## 2024-01-01 RX ORDER — AMIODARONE HYDROCHLORIDE 200 MG/1
400 TABLET ORAL DAILY
Start: 2024-01-01

## 2024-01-01 RX ORDER — GABAPENTIN 300 MG/1
300 CAPSULE ORAL 2 TIMES DAILY
Start: 2024-01-01

## 2024-01-01 RX ORDER — POTASSIUM CHLORIDE 20 MEQ/1
40 TABLET, EXTENDED RELEASE ORAL ONCE
Status: COMPLETED | OUTPATIENT
Start: 2024-01-01 | End: 2024-01-01

## 2024-01-01 RX ORDER — BENZONATATE 200 MG/1
200 CAPSULE ORAL 3 TIMES DAILY PRN
COMMUNITY
Start: 2024-01-01 | End: 2024-01-01

## 2024-01-01 RX ORDER — SODIUM CHLORIDE 0.9 % (FLUSH) 0.9 %
10 SYRINGE (ML) INJECTION
Status: DISCONTINUED | OUTPATIENT
Start: 2024-01-01 | End: 2024-01-01 | Stop reason: HOSPADM

## 2024-01-01 RX ORDER — CARVEDILOL 25 MG/1
25 TABLET ORAL 2 TIMES DAILY
Qty: 180 TABLET | Refills: 3 | Status: SHIPPED | OUTPATIENT
Start: 2024-01-01

## 2024-01-01 RX ORDER — SODIUM CHLORIDE 0.9 % (FLUSH) 0.9 %
10 SYRINGE (ML) INJECTION EVERY 6 HOURS PRN
Status: DISCONTINUED | OUTPATIENT
Start: 2024-01-01 | End: 2024-01-01 | Stop reason: HOSPADM

## 2024-01-01 RX ORDER — FUROSEMIDE 10 MG/ML
40 INJECTION INTRAMUSCULAR; INTRAVENOUS
Status: COMPLETED | OUTPATIENT
Start: 2024-01-01 | End: 2024-01-01

## 2024-01-01 RX ORDER — MIDAZOLAM HYDROCHLORIDE 1 MG/ML
INJECTION, SOLUTION INTRAMUSCULAR; INTRAVENOUS
Status: DISCONTINUED | OUTPATIENT
Start: 2024-01-01 | End: 2024-01-01 | Stop reason: HOSPADM

## 2024-01-01 RX ORDER — AMIODARONE HYDROCHLORIDE 200 MG/1
400 TABLET ORAL DAILY
Qty: 180 TABLET | Refills: 3 | Status: SHIPPED | OUTPATIENT
Start: 2024-01-01 | End: 2024-01-01

## 2024-01-01 RX ORDER — PROCHLORPERAZINE EDISYLATE 5 MG/ML
5 INJECTION INTRAMUSCULAR; INTRAVENOUS EVERY 6 HOURS PRN
Status: DISCONTINUED | OUTPATIENT
Start: 2024-01-01 | End: 2024-01-01 | Stop reason: HOSPADM

## 2024-01-01 RX ORDER — HEPARIN SODIUM 1000 [USP'U]/ML
INJECTION, SOLUTION INTRAVENOUS; SUBCUTANEOUS
Status: DISCONTINUED | OUTPATIENT
Start: 2024-01-01 | End: 2024-01-01 | Stop reason: HOSPADM

## 2024-01-01 RX ORDER — PROPOFOL 10 MG/ML
VIAL (ML) INTRAVENOUS CONTINUOUS PRN
Status: DISCONTINUED | OUTPATIENT
Start: 2024-01-01 | End: 2024-01-01

## 2024-01-01 RX ORDER — LOSARTAN POTASSIUM 25 MG/1
25 TABLET ORAL DAILY
Status: DISCONTINUED | OUTPATIENT
Start: 2024-01-01 | End: 2024-01-01

## 2024-01-01 RX ORDER — LOSARTAN POTASSIUM 50 MG/1
50 TABLET ORAL DAILY
Status: DISCONTINUED | OUTPATIENT
Start: 2024-01-01 | End: 2024-01-01

## 2024-01-01 RX ORDER — LOSARTAN POTASSIUM 50 MG/1
50 TABLET ORAL DAILY
Qty: 90 TABLET | Refills: 3 | Status: SHIPPED | OUTPATIENT
Start: 2024-01-01

## 2024-01-01 RX ORDER — ALBUTEROL SULFATE 90 UG/1
1-2 AEROSOL, METERED RESPIRATORY (INHALATION) EVERY 6 HOURS PRN
COMMUNITY
Start: 2023-01-01 | End: 2024-01-01

## 2024-01-01 RX ORDER — HALOPERIDOL 5 MG/ML
0.5 INJECTION INTRAMUSCULAR EVERY 10 MIN PRN
Status: CANCELLED | OUTPATIENT
Start: 2024-01-01

## 2024-01-01 RX ORDER — DIGOXIN 250 MCG
250 TABLET ORAL DAILY
Qty: 90 TABLET | Refills: 3 | Status: SHIPPED | OUTPATIENT
Start: 2024-01-01

## 2024-01-01 RX ORDER — TAMSULOSIN HYDROCHLORIDE 0.4 MG/1
0.4 CAPSULE ORAL DAILY
Status: DISCONTINUED | OUTPATIENT
Start: 2024-01-01 | End: 2024-01-01 | Stop reason: HOSPADM

## 2024-01-01 RX ORDER — MAGNESIUM SULFATE HEPTAHYDRATE 40 MG/ML
2 INJECTION, SOLUTION INTRAVENOUS DAILY PRN
Status: DISCONTINUED | OUTPATIENT
Start: 2024-01-01 | End: 2024-01-01 | Stop reason: HOSPADM

## 2024-01-01 RX ORDER — ONDANSETRON 4 MG/1
8 TABLET, ORALLY DISINTEGRATING ORAL EVERY 8 HOURS PRN
Status: DISCONTINUED | OUTPATIENT
Start: 2024-01-01 | End: 2024-01-01 | Stop reason: HOSPADM

## 2024-01-01 RX ORDER — CARVEDILOL 25 MG/1
25 TABLET ORAL 2 TIMES DAILY
Status: DISCONTINUED | OUTPATIENT
Start: 2024-01-01 | End: 2024-01-01 | Stop reason: HOSPADM

## 2024-01-01 RX ORDER — TADALAFIL 5 MG/1
5 TABLET ORAL DAILY PRN
Qty: 30 TABLET | Refills: 1 | Status: SHIPPED | OUTPATIENT
Start: 2024-01-01 | End: 2025-01-30

## 2024-01-01 RX ORDER — ALUMINUM HYDROXIDE, MAGNESIUM HYDROXIDE, AND SIMETHICONE 1200; 120; 1200 MG/30ML; MG/30ML; MG/30ML
30 SUSPENSION ORAL 4 TIMES DAILY PRN
Status: DISCONTINUED | OUTPATIENT
Start: 2024-01-01 | End: 2024-01-01 | Stop reason: HOSPADM

## 2024-01-01 RX ORDER — MECLIZINE HYDROCHLORIDE 25 MG/1
25 TABLET ORAL 3 TIMES DAILY PRN
Qty: 90 TABLET | Refills: 1 | Status: SHIPPED | OUTPATIENT
Start: 2024-01-01

## 2024-01-01 RX ORDER — DIGOXIN 125 MCG
0.12 TABLET ORAL
Qty: 15 TABLET | Refills: 11 | Status: SHIPPED | OUTPATIENT
Start: 2024-01-01 | End: 2025-04-03

## 2024-01-01 RX ORDER — NITROGLYCERIN 0.4 MG/1
0.4 TABLET SUBLINGUAL EVERY 5 MIN PRN
Status: DISCONTINUED | OUTPATIENT
Start: 2024-01-01 | End: 2024-01-01 | Stop reason: HOSPADM

## 2024-01-01 RX ORDER — SODIUM CHLORIDE 0.9 % (FLUSH) 0.9 %
10 SYRINGE (ML) INJECTION
Status: CANCELLED | OUTPATIENT
Start: 2024-01-01

## 2024-01-01 RX ORDER — ACETAMINOPHEN 325 MG/1
650 TABLET ORAL EVERY 4 HOURS PRN
Status: DISCONTINUED | OUTPATIENT
Start: 2024-01-01 | End: 2024-01-01 | Stop reason: HOSPADM

## 2024-01-01 RX ORDER — AZITHROMYCIN 500 MG/1
500 TABLET, FILM COATED ORAL
COMMUNITY
Start: 2024-01-01 | End: 2024-01-01

## 2024-01-01 RX ORDER — SPIRONOLACTONE 25 MG/1
12.5 TABLET ORAL DAILY
Qty: 15 TABLET | Refills: 11 | Status: SHIPPED | OUTPATIENT
Start: 2024-01-01 | End: 2025-04-02

## 2024-01-01 RX ORDER — MECLIZINE HCL 12.5 MG 12.5 MG/1
25 TABLET ORAL 3 TIMES DAILY PRN
Status: DISCONTINUED | OUTPATIENT
Start: 2024-01-01 | End: 2024-01-01 | Stop reason: HOSPADM

## 2024-01-01 RX ORDER — FUROSEMIDE 40 MG/1
40 TABLET ORAL DAILY
Qty: 90 TABLET | Refills: 3 | Status: SHIPPED | OUTPATIENT
Start: 2024-01-01

## 2024-01-01 RX ORDER — DIGOXIN 125 MCG
0.12 TABLET ORAL DAILY
Status: DISCONTINUED | OUTPATIENT
Start: 2024-01-01 | End: 2024-01-01

## 2024-01-01 RX ORDER — ETOMIDATE 2 MG/ML
INJECTION INTRAVENOUS
Status: DISCONTINUED | OUTPATIENT
Start: 2024-01-01 | End: 2024-01-01

## 2024-01-01 RX ORDER — AMIODARONE HYDROCHLORIDE 200 MG/1
400 TABLET ORAL
Qty: 180 TABLET | Refills: 3 | Status: SHIPPED | OUTPATIENT
Start: 2024-01-01

## 2024-01-01 RX ORDER — FUROSEMIDE 10 MG/ML
80 INJECTION INTRAMUSCULAR; INTRAVENOUS EVERY 12 HOURS
Status: DISCONTINUED | OUTPATIENT
Start: 2024-01-01 | End: 2024-01-01

## 2024-01-01 RX ORDER — ATORVASTATIN CALCIUM 40 MG/1
40 TABLET, FILM COATED ORAL DAILY
Qty: 90 TABLET | Refills: 3 | Status: SHIPPED | OUTPATIENT
Start: 2024-01-01

## 2024-01-01 RX ORDER — LIDOCAINE HYDROCHLORIDE 20 MG/ML
INJECTION INTRAVENOUS
Status: DISCONTINUED | OUTPATIENT
Start: 2024-01-01 | End: 2024-01-01

## 2024-01-01 RX ORDER — DIPHENHYDRAMINE HCL 50 MG
50 CAPSULE ORAL ONCE
Status: COMPLETED | OUTPATIENT
Start: 2024-01-01 | End: 2024-01-01

## 2024-01-01 RX ORDER — HEPARIN SOD,PORCINE/0.9 % NACL 1000/500ML
INTRAVENOUS SOLUTION INTRAVENOUS
Status: DISCONTINUED | OUTPATIENT
Start: 2024-01-01 | End: 2024-01-01 | Stop reason: HOSPADM

## 2024-01-01 RX ORDER — FUROSEMIDE 40 MG/1
40 TABLET ORAL DAILY
Status: DISCONTINUED | OUTPATIENT
Start: 2024-01-01 | End: 2024-01-01 | Stop reason: HOSPADM

## 2024-01-01 RX ORDER — FUROSEMIDE 10 MG/ML
40 INJECTION INTRAMUSCULAR; INTRAVENOUS EVERY 12 HOURS
Status: DISCONTINUED | OUTPATIENT
Start: 2024-01-01 | End: 2024-01-01

## 2024-01-01 RX ORDER — LIDOCAINE HYDROCHLORIDE 20 MG/ML
INJECTION, SOLUTION EPIDURAL; INFILTRATION; INTRACAUDAL; PERINEURAL
Status: DISCONTINUED | OUTPATIENT
Start: 2024-01-01 | End: 2024-01-01 | Stop reason: HOSPADM

## 2024-01-01 RX ORDER — ONDANSETRON HYDROCHLORIDE 2 MG/ML
4 INJECTION, SOLUTION INTRAVENOUS DAILY PRN
Status: CANCELLED | OUTPATIENT
Start: 2024-01-01

## 2024-01-01 RX ORDER — ALLOPURINOL 100 MG/1
1 TABLET ORAL DAILY
COMMUNITY
Start: 2023-01-01 | End: 2024-01-01 | Stop reason: CLARIF

## 2024-01-01 RX ORDER — LIDOCAINE HYDROCHLORIDE 20 MG/ML
SOLUTION OROPHARYNGEAL
Status: DISCONTINUED | OUTPATIENT
Start: 2024-01-01 | End: 2024-01-01

## 2024-01-01 RX ADMIN — TAMSULOSIN HYDROCHLORIDE 0.4 MG: 0.4 CAPSULE ORAL at 08:03

## 2024-01-01 RX ADMIN — ETOMIDATE 12 MG: 2 INJECTION, SOLUTION INTRAVENOUS at 11:03

## 2024-01-01 RX ADMIN — GABAPENTIN 300 MG: 300 CAPSULE ORAL at 08:03

## 2024-01-01 RX ADMIN — CARVEDILOL 25 MG: 12.5 TABLET, FILM COATED ORAL at 08:03

## 2024-01-01 RX ADMIN — AMIODARONE HYDROCHLORIDE 400 MG: 200 TABLET ORAL at 08:03

## 2024-01-01 RX ADMIN — FUROSEMIDE 40 MG: 40 TABLET ORAL at 09:04

## 2024-01-01 RX ADMIN — CARVEDILOL 25 MG: 12.5 TABLET, FILM COATED ORAL at 10:03

## 2024-01-01 RX ADMIN — PROPOFOL 20 MG: 10 INJECTION, EMULSION INTRAVENOUS at 11:03

## 2024-01-01 RX ADMIN — ATORVASTATIN CALCIUM 40 MG: 40 TABLET, FILM COATED ORAL at 08:03

## 2024-01-01 RX ADMIN — DIGOXIN 0.12 MG: 125 TABLET ORAL at 09:04

## 2024-01-01 RX ADMIN — POTASSIUM CHLORIDE 40 MEQ: 1500 TABLET, EXTENDED RELEASE ORAL at 01:03

## 2024-01-01 RX ADMIN — SPIRONOLACTONE 12.5 MG: 25 TABLET ORAL at 09:04

## 2024-01-01 RX ADMIN — SPIRONOLACTONE 12.5 MG: 25 TABLET ORAL at 09:03

## 2024-01-01 RX ADMIN — TAMSULOSIN HYDROCHLORIDE 0.4 MG: 0.4 CAPSULE ORAL at 09:03

## 2024-01-01 RX ADMIN — FUROSEMIDE 40 MG: 40 TABLET ORAL at 08:03

## 2024-01-01 RX ADMIN — APIXABAN 5 MG: 5 TABLET, FILM COATED ORAL at 09:03

## 2024-01-01 RX ADMIN — CARVEDILOL 25 MG: 12.5 TABLET, FILM COATED ORAL at 09:03

## 2024-01-01 RX ADMIN — SACUBITRIL AND VALSARTAN 1 TABLET: 24; 26 TABLET, FILM COATED ORAL at 08:03

## 2024-01-01 RX ADMIN — SODIUM CHLORIDE: 9 INJECTION, SOLUTION INTRAVENOUS at 02:04

## 2024-01-01 RX ADMIN — DIGOXIN 0.12 MG: 125 TABLET ORAL at 08:03

## 2024-01-01 RX ADMIN — EMPAGLIFLOZIN 10 MG: 10 TABLET, FILM COATED ORAL at 10:03

## 2024-01-01 RX ADMIN — FUROSEMIDE 80 MG: 10 INJECTION, SOLUTION INTRAVENOUS at 08:03

## 2024-01-01 RX ADMIN — FUROSEMIDE 40 MG: 10 INJECTION, SOLUTION INTRAVENOUS at 09:03

## 2024-01-01 RX ADMIN — FUROSEMIDE 40 MG: 10 INJECTION, SOLUTION INTRAVENOUS at 10:03

## 2024-01-01 RX ADMIN — AMIODARONE HYDROCHLORIDE 400 MG: 200 TABLET ORAL at 09:04

## 2024-01-01 RX ADMIN — TAMSULOSIN HYDROCHLORIDE 0.4 MG: 0.4 CAPSULE ORAL at 10:03

## 2024-01-01 RX ADMIN — SACUBITRIL AND VALSARTAN 1 TABLET: 24; 26 TABLET, FILM COATED ORAL at 09:03

## 2024-01-01 RX ADMIN — GABAPENTIN 300 MG: 300 CAPSULE ORAL at 09:03

## 2024-01-01 RX ADMIN — CARVEDILOL 25 MG: 12.5 TABLET, FILM COATED ORAL at 01:03

## 2024-01-01 RX ADMIN — ETOMIDATE 4 MG: 2 INJECTION, SOLUTION INTRAVENOUS at 11:03

## 2024-01-01 RX ADMIN — EMPAGLIFLOZIN 10 MG: 10 TABLET, FILM COATED ORAL at 09:04

## 2024-01-01 RX ADMIN — AMIODARONE HYDROCHLORIDE 400 MG: 200 TABLET ORAL at 10:03

## 2024-01-01 RX ADMIN — EMPAGLIFLOZIN 10 MG: 10 TABLET, FILM COATED ORAL at 09:03

## 2024-01-01 RX ADMIN — TAMSULOSIN HYDROCHLORIDE 0.4 MG: 0.4 CAPSULE ORAL at 09:04

## 2024-01-01 RX ADMIN — SPIRONOLACTONE 12.5 MG: 25 TABLET ORAL at 08:03

## 2024-01-01 RX ADMIN — ASPIRIN 81 MG: 81 TABLET, COATED ORAL at 01:03

## 2024-01-01 RX ADMIN — SODIUM CHLORIDE: 9 INJECTION, SOLUTION INTRAVENOUS at 11:03

## 2024-01-01 RX ADMIN — EMPAGLIFLOZIN 10 MG: 10 TABLET, FILM COATED ORAL at 08:03

## 2024-01-01 RX ADMIN — ATORVASTATIN CALCIUM 40 MG: 40 TABLET, FILM COATED ORAL at 09:04

## 2024-01-01 RX ADMIN — FUROSEMIDE 40 MG: 40 TABLET ORAL at 09:03

## 2024-01-01 RX ADMIN — DIPHENHYDRAMINE HYDROCHLORIDE 50 MG: 50 CAPSULE ORAL at 09:04

## 2024-01-01 RX ADMIN — APIXABAN 5 MG: 5 TABLET, FILM COATED ORAL at 08:03

## 2024-01-01 RX ADMIN — ATORVASTATIN CALCIUM 40 MG: 40 TABLET, FILM COATED ORAL at 09:03

## 2024-01-01 RX ADMIN — SACUBITRIL AND VALSARTAN 1 TABLET: 24; 26 TABLET, FILM COATED ORAL at 09:04

## 2024-01-01 RX ADMIN — ATORVASTATIN CALCIUM 40 MG: 40 TABLET, FILM COATED ORAL at 10:03

## 2024-01-01 RX ADMIN — GABAPENTIN 300 MG: 300 CAPSULE ORAL at 10:03

## 2024-01-01 RX ADMIN — PROPOFOL 50 MCG/KG/MIN: 10 INJECTION, EMULSION INTRAVENOUS at 11:03

## 2024-01-01 RX ADMIN — Medication 6 MG: at 01:03

## 2024-01-01 RX ADMIN — GABAPENTIN 300 MG: 300 CAPSULE ORAL at 09:04

## 2024-01-01 RX ADMIN — LIDOCAINE HYDROCHLORIDE 50 MG: 20 INJECTION INTRAVENOUS at 11:03

## 2024-01-01 RX ADMIN — LOSARTAN POTASSIUM 50 MG: 50 TABLET, FILM COATED ORAL at 10:03

## 2024-01-01 RX ADMIN — LIDOCAINE HYDROCHLORIDE 15 ML: 20 SOLUTION OROPHARYNGEAL at 11:03

## 2024-01-01 RX ADMIN — DIGOXIN 250 MCG: 125 TABLET ORAL at 10:03

## 2024-01-01 RX ADMIN — Medication 6 MG: at 09:03

## 2024-01-01 RX ADMIN — AMIODARONE HYDROCHLORIDE 400 MG: 200 TABLET ORAL at 09:03

## 2024-01-01 RX ADMIN — CARVEDILOL 25 MG: 12.5 TABLET, FILM COATED ORAL at 09:04

## 2024-01-31 PROBLEM — I48.91 A-FIB: Status: ACTIVE | Noted: 2024-01-31

## 2024-01-31 NOTE — PROGRESS NOTES
Cardiology    1/31/2024  10:55 AM    Problem list  Patient Active Problem List   Diagnosis    Automatic implantable cardioverter-defibrillator in situ    Essential hypertension    Mixed hyperlipidemia    Nonischemic cardiomyopathy    VT (ventricular tachycardia)    BMI 39.0-39.9,adult    Morbid obesity with BMI of 40.0-44.9, adult    A-fib       CC:  F/u    HPI:    Patient is here follow-up.  He went to the emergency room last month for atypical chest pain.  He denies any chest pain or shortness of breath now.  He has been doing well.  He is requesting medication to help with his ED. In November, ICD interrogation revealed paroxysmal atrial fibrillation and Eliquis was started.  He is tolerating his medicines.  He denies any bleeding.  He is also tolerating Jardiance.    Medications  Current Outpatient Medications   Medication Sig Dispense Refill    amiodarone (PACERONE) 200 MG Tab TAKE 2 TABLETS BY MOUTH ONCE DAILY 180 tablet 3    apixaban (ELIQUIS) 5 mg Tab Take 1 tablet (5 mg total) by mouth 2 (two) times daily. 180 tablet 3    atorvastatin (LIPITOR) 40 MG tablet Take 1 tablet (40 mg total) by mouth once daily. 90 tablet 3    carvediloL (COREG) 25 MG tablet Take 1 tablet (25 mg total) by mouth 2 (two) times a day. 180 tablet 3    digoxin (LANOXIN) 250 mcg tablet Take 1 tablet (250 mcg total) by mouth once daily. 90 tablet 3    empagliflozin (JARDIANCE) 10 mg tablet Take 1 tablet (10 mg total) by mouth once daily. 90 tablet 3    furosemide (LASIX) 40 MG tablet Take 1 tablet (40 mg total) by mouth once daily. 1 Tablet Oral Every day 90 tablet 3    gabapentin (NEURONTIN) 300 MG capsule Take 1 capsule (300 mg total) by mouth 3 (three) times daily. (Patient taking differently: Take 300 mg by mouth Daily.) 90 capsule 2    losartan (COZAAR) 50 MG tablet Take 1 tablet (50 mg total) by mouth once daily. 90 tablet 3    meclizine (ANTIVERT) 25 mg tablet Take 1 tablet (25 mg total) by mouth 3 (three) times daily as  needed for Dizziness. 90 tablet 1    tamsulosin (FLOMAX) 0.4 mg Cap Take by mouth once daily.      ALPRAZolam (XANAX) 0.25 MG tablet Take 1 tablet (0.25 mg total) by mouth 2 (two) times daily as needed for Anxiety. 30 tablet 1    aspirin (ECOTRIN) 81 MG EC tablet Take 1 tablet (81 mg total) by mouth once. 1 Tablet, Delayed Release (E.C.) Oral Every day for 1 dose 90 tablet 3    sildenafiL (VIAGRA) 100 MG tablet Take 1 tablet (100 mg total) by mouth daily as needed for Erectile Dysfunction. 10 tablet 3     No current facility-administered medications for this visit.      Prior to Admission medications    Medication Sig Start Date End Date Taking? Authorizing Provider   amiodarone (PACERONE) 200 MG Tab TAKE 2 TABLETS BY MOUTH ONCE DAILY 1/9/24  Yes Senthil Rodríguez MD   apixaban (ELIQUIS) 5 mg Tab Take 1 tablet (5 mg total) by mouth 2 (two) times daily. 11/30/23  Yes Senthil Rodríguez MD   atorvastatin (LIPITOR) 40 MG tablet Take 1 tablet (40 mg total) by mouth once daily. 1/8/24  Yes Senthil Rodríguez MD   carvediloL (COREG) 25 MG tablet Take 1 tablet (25 mg total) by mouth 2 (two) times a day. 1/8/24  Yes Setnhil Rodríguez MD   digoxin (LANOXIN) 250 mcg tablet Take 1 tablet (250 mcg total) by mouth once daily. 1/8/24  Yes Senthil Rodríguez MD   empagliflozin (JARDIANCE) 10 mg tablet Take 1 tablet (10 mg total) by mouth once daily. 11/30/23  Yes Senthil Rodríguez MD   furosemide (LASIX) 40 MG tablet Take 1 tablet (40 mg total) by mouth once daily. 1 Tablet Oral Every day 1/8/24  Yes Senthil Rodríguez MD   gabapentin (NEURONTIN) 300 MG capsule Take 1 capsule (300 mg total) by mouth 3 (three) times daily.  Patient taking differently: Take 300 mg by mouth Daily. 12/13/23  Yes Db Gomez MD   losartan (COZAAR) 50 MG tablet Take 1 tablet (50 mg total) by mouth once daily. 1/8/24  Yes Senthil Rodríguez MD   meclizine (ANTIVERT) 25 mg tablet Take 1 tablet (25 mg total) by mouth 3 (three) times daily as needed for  Dizziness. 1/8/24  Yes Senthil Rodríguez MD   tamsulosin (FLOMAX) 0.4 mg Cap Take by mouth once daily.   Yes Provider, Historical   ALPRAZolam (XANAX) 0.25 MG tablet Take 1 tablet (0.25 mg total) by mouth 2 (two) times daily as needed for Anxiety. 4/6/22 5/6/22  Senthil Rodríguez MD   aspirin (ECOTRIN) 81 MG EC tablet Take 1 tablet (81 mg total) by mouth once. 1 Tablet, Delayed Release (E.C.) Oral Every day for 1 dose 4/4/22 4/14/22  Senthil Rodríguez MD   sildenafiL (VIAGRA) 100 MG tablet Take 1 tablet (100 mg total) by mouth daily as needed for Erectile Dysfunction. 3/9/21 3/9/22  Senthil Rodríguez MD         History  Past Medical History:   Diagnosis Date    A-fib     AICD (automatic cardioverter/defibrillator) present     CHF (congestive heart failure)     DDD (degenerative disc disease), lumbar     HTN (hypertension)     Lumbar radiculopathy     Mixed hyperlipidemia     SHERYL (obstructive sleep apnea)     Other cardiomyopathies     TIA (transient ischemic attack)     VT (ventricular tachycardia)      No past surgical history on file.  Social History     Socioeconomic History    Marital status:    Tobacco Use    Smoking status: Former     Types: Cigars    Smokeless tobacco: Never   Substance and Sexual Activity    Alcohol use: Yes    Drug use: Never    Sexual activity: Not Currently         Allergies  Review of patient's allergies indicates:   Allergen Reactions    Ace inhibitors      cough    Adhesive      Skin rash         Review of Systems   Review of Systems   Constitutional: Negative for decreased appetite, fever and weight loss.   HENT:  Negative for congestion and nosebleeds.    Eyes:  Negative for blurred vision, double vision, vision loss in left eye, vision loss in right eye and visual disturbance.   Cardiovascular:  Negative for chest pain, claudication, cyanosis, dyspnea on exertion, irregular heartbeat, leg swelling, near-syncope, orthopnea, palpitations, paroxysmal nocturnal dyspnea and  syncope.   Respiratory:  Negative for cough, hemoptysis, shortness of breath, sleep disturbances due to breathing, snoring, sputum production and wheezing.    Endocrine: Negative for cold intolerance and heat intolerance.   Skin:  Negative for nail changes and rash.   Musculoskeletal:  Negative for joint pain, muscle cramps, muscle weakness and myalgias.   Gastrointestinal:  Negative for change in bowel habit, heartburn, hematemesis, hematochezia, hemorrhoids and melena.   Neurological:  Negative for dizziness, focal weakness and headaches.         Physical Exam  Wt Readings from Last 1 Encounters:   01/31/24 130.2 kg (286 lb 14.9 oz)     BP Readings from Last 3 Encounters:   01/31/24 114/74   12/21/23 112/60   11/29/23 (!) 142/80     Pulse Readings from Last 1 Encounters:   01/31/24 81     Body mass index is 41.17 kg/m².    Physical Exam  Vitals reviewed.   Constitutional:       Appearance: He is well-developed.   Cardiovascular:      Rate and Rhythm: Normal rate and regular rhythm.      Pulses:           Carotid pulses are 2+ on the right side and 2+ on the left side.       Radial pulses are 2+ on the right side and 2+ on the left side.        Dorsalis pedis pulses are 2+ on the right side and 2+ on the left side.      Heart sounds: Normal heart sounds, S1 normal and S2 normal.      Comments: L side ICD.  Pulmonary:      Breath sounds: Normal breath sounds.   Abdominal:      General: Bowel sounds are normal.   Skin:     General: Skin is warm and dry.   Neurological:      Mental Status: He is alert and oriented to person, place, and time.             Assessment  1. Chronic combined systolic and diastolic heart failure    Compensated    2. PAF (paroxysmal atrial fibrillation)   Stable on Eliquis    3. Morbid obesity with BMI of 40.0-44.9, adult   unchanged    4. Other cardiomyopathies   stable    5. Essential hypertension   Controlled on current medications    6. Mixed hyperlipidemia   stable            Plan and  Discussion   Reviewed and discussed his labs which were stable.  Recommend to continue current medication.  Rx for Cialis provided.    Follow Up  4 months with ICD check      Senthil Rodríguez MD, F.A.C.C, F.S.C.A.I.      Total professional time spent for the encounter: 30 minutes  Time was spent preparing to see the patient, reviewing results of prior testing, obtaining and/or reviewing separately obtained history, performing a medically appropriate examination and interview, counseling and educating the patient/family, ordering medications/tests/procedures, referring and communicating with other health care professionals, documenting clinical information in the electronic health record, and independently interpreting results.    Disclaimer: This document was created using voice recognition software (M*Modal Fluency Direct). Although it may be edited, this document may contain errors related to incorrect recognition of the spoken word. Please call the physician if clarification is needed.

## 2024-02-26 PROBLEM — I34.0 NONRHEUMATIC MITRAL VALVE REGURGITATION: Status: ACTIVE | Noted: 2024-01-01

## 2024-02-26 NOTE — PATIENT INSTRUCTIONS
Delsym for cough.  Increase furosemide to 40mg twice daily.  Get labs and blood culture next Monday 3/4/24.  Refer for MINISTERIO.

## 2024-02-26 NOTE — PROGRESS NOTES
Cardiology    2/26/2024  1:06 PM    Problem list  Patient Active Problem List   Diagnosis    Automatic implantable cardioverter-defibrillator in situ    Essential hypertension    Mixed hyperlipidemia    Nonischemic cardiomyopathy    VT (ventricular tachycardia)    BMI 39.0-39.9,adult    Morbid obesity with BMI of 40.0-44.9, adult    A-fib    Nonrheumatic mitral valve regurgitation       CC:  Hospital discharge f/u    HPI:  He was admitted to outside facility at [facility] for CHF/pneumonia and was discharged yesterday.  He was contacted and asked to come in today for f/u.  Reviewed of records from his hospitalization showed CTA which was negative for PE but had bilateral small pleural effusion and ill-defined ground-glass opacity in the right upper and lower lobes.  He also had blood culture that was positive for coag negative Staphylococcus.  He had ID specialist (Dr Tovar) evaluated him who recommended completion of his antibiotics and recheck Bcx 1 week after discharge.  He had a 2nd set of blood culture that was negative.  He also had echocardiogram which shows severe LV dysfunction EF 25% which is unchanged and moderate-severe mitral regurgitation.  He was recommended to be evaluated for JACLYN.    He reports of coughing but it was nonproductive.  He denies any fever or chills.  He states that his cough is worse when he takes a deep breath.  He denies any paroxysmal nocturnal dyspnea.        Medications  Current Outpatient Medications   Medication Sig Dispense Refill    amiodarone (PACERONE) 200 MG Tab TAKE 2 TABLETS BY MOUTH ONCE DAILY 180 tablet 3    apixaban (ELIQUIS) 5 mg Tab Take 1 tablet (5 mg total) by mouth 2 (two) times daily. 180 tablet 3    atorvastatin (LIPITOR) 40 MG tablet Take 1 tablet (40 mg total) by mouth once daily. 90 tablet 3    azithromycin (ZITHROMAX) 500 MG tablet Take 500 mg by mouth.      benzonatate (TESSALON) 200 MG capsule Take 200 mg by mouth 3 (three) times daily as needed.       carvediloL (COREG) 25 MG tablet Take 1 tablet (25 mg total) by mouth 2 (two) times a day. 180 tablet 3    digoxin (LANOXIN) 250 mcg tablet Take 1 tablet (250 mcg total) by mouth once daily. 90 tablet 3    empagliflozin (JARDIANCE) 10 mg tablet Take 1 tablet (10 mg total) by mouth once daily. 90 tablet 3    furosemide (LASIX) 40 MG tablet Take 1 tablet (40 mg total) by mouth once daily. 1 Tablet Oral Every day 90 tablet 3    gabapentin (NEURONTIN) 300 MG capsule Take 1 capsule (300 mg total) by mouth 3 (three) times daily. (Patient taking differently: Take 300 mg by mouth Daily.) 90 capsule 2    losartan (COZAAR) 50 MG tablet Take 1 tablet (50 mg total) by mouth once daily. 90 tablet 3    meclizine (ANTIVERT) 25 mg tablet Take 1 tablet (25 mg total) by mouth 3 (three) times daily as needed for Dizziness. 90 tablet 1    tadalafiL (CIALIS) 5 MG tablet Take 1 tablet (5 mg total) by mouth daily as needed for Erectile Dysfunction. 30 tablet 1    tamsulosin (FLOMAX) 0.4 mg Cap Take by mouth once daily.      ALPRAZolam (XANAX) 0.25 MG tablet Take 1 tablet (0.25 mg total) by mouth 2 (two) times daily as needed for Anxiety. 30 tablet 1    aspirin (ECOTRIN) 81 MG EC tablet Take 1 tablet (81 mg total) by mouth once. 1 Tablet, Delayed Release (E.C.) Oral Every day for 1 dose 90 tablet 3     No current facility-administered medications for this visit.      Prior to Admission medications    Medication Sig Start Date End Date Taking? Authorizing Provider   amiodarone (PACERONE) 200 MG Tab TAKE 2 TABLETS BY MOUTH ONCE DAILY 1/9/24  Yes Senthil Rodríguez MD   apixaban (ELIQUIS) 5 mg Tab Take 1 tablet (5 mg total) by mouth 2 (two) times daily. 11/30/23  Yes Senthil Rodríguez MD   atorvastatin (LIPITOR) 40 MG tablet Take 1 tablet (40 mg total) by mouth once daily. 1/8/24  Yes Senthil Rodríguez MD   azithromycin (ZITHROMAX) 500 MG tablet Take 500 mg by mouth. 2/22/24 2/26/24 Yes Provider, Historical   benzonatate (TESSALON) 200 MG  capsule Take 200 mg by mouth 3 (three) times daily as needed. 2/22/24 2/29/24 Yes Provider, Historical   carvediloL (COREG) 25 MG tablet Take 1 tablet (25 mg total) by mouth 2 (two) times a day. 1/8/24  Yes Senthil Rodríguez MD   digoxin (LANOXIN) 250 mcg tablet Take 1 tablet (250 mcg total) by mouth once daily. 1/8/24  Yes Senthil Rodríguez MD   empagliflozin (JARDIANCE) 10 mg tablet Take 1 tablet (10 mg total) by mouth once daily. 11/30/23  Yes Senthil Rodríguez MD   furosemide (LASIX) 40 MG tablet Take 1 tablet (40 mg total) by mouth once daily. 1 Tablet Oral Every day 1/8/24  Yes Senthil Rodríguez MD   gabapentin (NEURONTIN) 300 MG capsule Take 1 capsule (300 mg total) by mouth 3 (three) times daily.  Patient taking differently: Take 300 mg by mouth Daily. 12/13/23  Yes Db Gomez MD   losartan (COZAAR) 50 MG tablet Take 1 tablet (50 mg total) by mouth once daily. 1/8/24  Yes Senthil Rodríguez MD   meclizine (ANTIVERT) 25 mg tablet Take 1 tablet (25 mg total) by mouth 3 (three) times daily as needed for Dizziness. 1/8/24  Yes Senthil Rodríguez MD   tadalafiL (CIALIS) 5 MG tablet Take 1 tablet (5 mg total) by mouth daily as needed for Erectile Dysfunction. 1/31/24 1/30/25 Yes Senthil Rodríguez MD   tamsulosin (FLOMAX) 0.4 mg Cap Take by mouth once daily.   Yes Provider, Historical   ALPRAZolam (XANAX) 0.25 MG tablet Take 1 tablet (0.25 mg total) by mouth 2 (two) times daily as needed for Anxiety. 4/6/22 5/6/22  Senthil Rodríguez MD   aspirin (ECOTRIN) 81 MG EC tablet Take 1 tablet (81 mg total) by mouth once. 1 Tablet, Delayed Release (E.C.) Oral Every day for 1 dose 4/4/22 4/14/22  Senthil Rodríguez MD         History  Past Medical History:   Diagnosis Date    A-fib     AICD (automatic cardioverter/defibrillator) present     CHF (congestive heart failure)     DDD (degenerative disc disease), lumbar     HTN (hypertension)     Lumbar radiculopathy     Mixed hyperlipidemia     SHERYL (obstructive sleep apnea)      Other cardiomyopathies     TIA (transient ischemic attack)     VT (ventricular tachycardia)      No past surgical history on file.  Social History     Socioeconomic History    Marital status:    Tobacco Use    Smoking status: Former     Types: Cigars    Smokeless tobacco: Never   Substance and Sexual Activity    Alcohol use: Yes    Drug use: Never    Sexual activity: Not Currently         Allergies  Review of patient's allergies indicates:   Allergen Reactions    Ace inhibitors      cough    Adhesive      Skin rash         Review of Systems   Review of Systems   Constitutional: Negative for decreased appetite, fever and weight loss.   HENT:  Negative for congestion and nosebleeds.    Eyes:  Negative for blurred vision, double vision, vision loss in left eye, vision loss in right eye and visual disturbance.   Cardiovascular:  Negative for chest pain, claudication, cyanosis, dyspnea on exertion, irregular heartbeat, leg swelling, near-syncope, orthopnea, palpitations, paroxysmal nocturnal dyspnea and syncope.   Respiratory:  Positive for cough and shortness of breath. Negative for hemoptysis, sleep disturbances due to breathing, snoring, sputum production and wheezing.    Endocrine: Negative for cold intolerance and heat intolerance.   Skin:  Negative for nail changes and rash.   Musculoskeletal:  Negative for joint pain, muscle cramps, muscle weakness and myalgias.   Gastrointestinal:  Negative for change in bowel habit, heartburn, hematemesis, hematochezia, hemorrhoids and melena.   Neurological:  Negative for dizziness, focal weakness and headaches.         Physical Exam  Wt Readings from Last 1 Encounters:   01/31/24 130.2 kg (286 lb 14.9 oz)     BP Readings from Last 3 Encounters:   01/31/24 114/74   12/21/23 112/60   11/29/23 (!) 142/80     Pulse Readings from Last 1 Encounters:   01/31/24 81     There is no height or weight on file to calculate BMI.    Physical Exam  Vitals reviewed.   Constitutional:        Appearance: He is well-developed.   Cardiovascular:      Rate and Rhythm: Normal rate and regular rhythm.      Pulses:           Carotid pulses are 2+ on the right side and 2+ on the left side.       Radial pulses are 2+ on the right side and 2+ on the left side.        Dorsalis pedis pulses are 2+ on the right side and 2+ on the left side.      Heart sounds: Normal heart sounds, S1 normal and S2 normal.      Comments: L side ICD.  Pulmonary:      Breath sounds: Normal breath sounds.   Abdominal:      General: Bowel sounds are normal.   Skin:     General: Skin is warm and dry.   Neurological:      Mental Status: He is alert and oriented to person, place, and time.             Assessment  1. Automatic implantable cardioverter-defibrillator in situ  Stable function on interrogation today    2. Essential hypertension  Controlled    3. Nonischemic cardiomyopathy  Class 2    4. Paroxysmal atrial fibrillation  Sinus, on anticoagulation    5. VT (ventricular tachycardia)  Stable    6. Nonrheumatic mitral valve regurgitation  Moderate to severe on echocardiogram done last week        Plan and Discussion  Reviewed and discussed his hospitalization records from outside facility at [facility].  Discussed his hospitalization and plans with him.  Reviewed and discussed his echocardiogram.  ID specialist at [facility] was not convinced that the first set of Bcx was a true finding since the second set was negative.  Will repeat his blood culture and labs in 1 week.  Take furosemide 40 mg twice daily.  Patient may take Delsym for cough.  Will also refer for transesophageal echocardiogram with Dr Rothman at [facility] given his bacteremia and given his moderate-to-severe mitral regurgitation seen on transthoracic echocardiogram.    Follow Up  2-3 weeks.      Senthil Rodríguez MD, F.A.C.C, F.S.C.A.I.      Total professional time spent for the encounter: 50 minutes  Time was spent preparing to see the patient, reviewing results of prior testing,  obtaining and/or reviewing separately obtained history, performing a medically appropriate examination and interview, counseling and educating the patient/family, ordering medications/tests/procedures, referring and communicating with other health care professionals, documenting clinical information in the electronic health record, and independently interpreting results.    Disclaimer: This document was created using voice recognition software ([employer]). Although it may be edited, this document may contain errors related to incorrect recognition of the spoken word. Please call the physician if clarification is needed.

## 2024-02-27 NOTE — TELEPHONE ENCOUNTER
----- Message from Anastasia Patrick RN sent at 2/26/2024  1:38 PM CST -----  Schedule MINISTERIO and write clearance letter for pt to have crown- Dr. Quinteros ([facility]) [place]. Morris is scheduled for 3/25

## 2024-02-27 NOTE — TELEPHONE ENCOUNTER
Spoke to pt regarding MINISTERIO at [facility]. MINISTERIO scheduled for 3/20/24. Pt instructed to arrive at 6am. NPO after midnight. Take all medication with a sip of water. Pt verbalized understanding of instructions.

## 2024-03-06 NOTE — TELEPHONE ENCOUNTER
Discussed new time for MINISTERIO on 3/20. Informed pt he should arrive at [facility] [place] for 9am on 3/20 for 11am procedure; NPO after midnight. Pt verbalized understanding.

## 2024-03-20 NOTE — TRANSFER OF CARE
"Anesthesia Transfer of Care Note    Patient: Franco Cooper    Procedure(s) Performed: Procedure(s) (LRB):  Transesophageal echo (MINISTERIO) intra-procedure log documentation (N/A)    Patient location: PACU    Anesthesia Type: general    Transport from OR: Transported from OR on 2-3 L/min O2 by NC with adequate spontaneous ventilation    Post pain: adequate analgesia    Post assessment: no apparent anesthetic complications    Post vital signs: stable    Level of consciousness: responds to stimulation and awake    Nausea/Vomiting: no nausea/vomiting    Complications: none    Transfer of care protocol was followed      Last vitals: Visit Vitals  /59   Pulse 66   Temp 36.5 °C (97.7 °F) (Temporal)   Resp 18   Ht 5' 10" (1.778 m)   Wt 122.5 kg (270 lb)   SpO2 100%   BMI 38.74 kg/m²     "

## 2024-03-20 NOTE — NURSING
Pt's vs taken and wnl. Pt is AAOx4. Pt is free from s/s of pain and distress. Pt was able to drink, eat and walk without issues. AV Crocker came to bedside to discuss plan of care with pt and pt's spouse. Pt and pt's wife given discharge paperwork and education reiterated. All questions and concerns addressed. IV and tele removed. Pt waiting to be wheeled out in wheelchair by wife.

## 2024-03-20 NOTE — HOSPITAL COURSE
Tolerated MINISTERIO without complication. MINISTERIO formal report not finalized. Follow up with Dr. Senthil Rodríguez outpatient for follow up on results.

## 2024-03-20 NOTE — ANESTHESIA PREPROCEDURE EVALUATION
[facility]  Anesthesia Pre-Operative Evaluation        Patient Name: Franco Cooper  YOB: 1965  MRN: [id number]    SUBJECTIVE:     Pre-operative Evaluation for Procedure(s) (LRB):  Transesophageal echo (MINISTERIO) intra-procedure log documentation (N/A)     03/20/2024    Franco Cooper is a 59 y.o. male with a PMHx significant for HTN, HLD, SHERYL, paroxysmal AFib, nonischemic cardiomyopathy (LVEF 20%, s/p AICD) with recent admission for HF exacerbation vx pneumonia found to have a positive blood culture. Given findings of moderate-severe MR on surface echo, recommendation was made for further evaluation with MINISTERIO.      He now presents for the above procedure(s) with Cardiology.    Previous Airway (07/22/2021):  Preoxygenated: yes  Mask difficulty assessment: Easy mask  Final airway type: Endotracheal airway Successful airway: Oral ETT  Successful intubation technique: Video laryngoscopy  Endotracheal tube insertion site: Oral  Blade: McGrathqBlade size: #4  Cormack-Lehane Classification: grade IIa - partial view of glottis  ETT size: 8.0mm  ETT to lips (cm): 23 Measured from: Lips  Number of attempts at approach: 1     Patient Active Problem List   Diagnosis    Automatic implantable cardioverter-defibrillator in situ    Essential hypertension    Mixed hyperlipidemia    Nonischemic cardiomyopathy    VT (ventricular tachycardia)    BMI 39.0-39.9,adult    Morbid obesity with BMI of 40.0-44.9, adult    A-fib    Nonrheumatic mitral valve regurgitation       Review of patient's allergies indicates:   Allergen Reactions    Ace inhibitors      cough    Adhesive      Skin rash       Current Outpatient Medications   Medication Instructions    ALPRAZolam (XANAX) 0.25 mg, Oral, 2 times daily PRN    amiodarone (PACERONE) 400 mg, Oral    apixaban (ELIQUIS) 5 mg, Oral, 2 times daily    aspirin (ECOTRIN) 81 mg, Oral, Once, 1 Tablet, Delayed Release (E.C.) Oral Every day    atorvastatin (LIPITOR) 40 mg,  Oral, Daily    carvediloL (COREG) 25 mg, Oral, 2 times daily    digoxin (LANOXIN) 250 mcg, Oral, Daily    empagliflozin (JARDIANCE) 10 mg, Oral, Daily    furosemide (LASIX) 40 mg, Oral, Daily, 1 Tablet Oral Every day    gabapentin (NEURONTIN) 300 mg, Oral, 3 times daily    losartan (COZAAR) 50 mg, Oral, Daily    meclizine (ANTIVERT) 25 mg, Oral, 3 times daily PRN    tadalafiL (CIALIS) 5 mg, Oral, Daily PRN    tamsulosin (FLOMAX) 0.4 mg Cap Oral, Daily       Past Surgical History:   Procedure Laterality Date    JOINT REPLACEMENT      TONSILLECTOMY         Social History     Substance and Sexual Activity   Drug Use Never     Alcohol Use: Not on file     Tobacco Use: Medium Risk (3/20/2024)    Patient History     Smoking Tobacco Use: Former     Smokeless Tobacco Use: Never     Passive Exposure: Not on file       OBJECTIVE:     Vital Signs Range (Last 24H):  Temp:  [36.5 °C (97.7 °F)]   Pulse:  [70]   Resp:  [18]   BP: ()/(58)   SpO2:  [94 %]       Significant Labs    Heme Profile  Lab Results   Component Value Date    WBC 6.62 12/21/2023    HGB 12.1 (L) 12/21/2023    HCT 37.5 (L) 12/21/2023     12/21/2023       Coagulation Studies  Lab Results   Component Value Date    LABPROT 13.8 (H) 02/20/2024    INR 1.2 02/20/2024    APTT 32.1 02/20/2024       BMP  Lab Results   Component Value Date     12/21/2023    K 4.0 12/21/2023     12/21/2023    CO2 25 12/21/2023    BUN 22 (H) 12/21/2023    CREATININE 1.4 12/21/2023    MG 2.1 02/28/2008       Liver Function Tests  Lab Results   Component Value Date    AST 20 12/21/2023    ALT 14 12/21/2023    ALKPHOS 72 12/21/2023    BILITOT 0.7 12/21/2023    PROT 6.4 12/21/2023    ALBUMIN 2.8 (L) 12/21/2023       Lipid Profile  Lab Results   Component Value Date    CHOL 151 08/22/2022    HDL 59 08/22/2022    TRIG 48 08/22/2022       Endocrine Profile  Lab Results   Component Value Date    HGBA1C 5.2 01/06/2023    TSH 2.691 08/22/2022       Diagnostic Studies    XR  Chest (02/20/2024)  1. Cardiomegaly. Left-sided AICD.   2. A few increased interstitial lung markings are present in both mid-lower lung fields and there is minimal blunting of the lateral costophrenic angles bilaterally.     Cardiac Studies    EKG:   Results for orders placed or performed during the hospital encounter of 12/20/23   EKG 12-lead    Collection Time: 12/20/23  3:04 PM    Narrative    Test Reason : R07.9,    Vent. Rate : 062 BPM     Atrial Rate : 062 BPM     P-R Int : 220 ms          QRS Dur : 162 ms      QT Int : 450 ms       P-R-T Axes : 056 096 -35 degrees     QTc Int : 456 ms    Sinus rhythm with 1st degree A-V block  Nonspecific intraventricular block  Possible Inferior infarct ,age undetermined  Anterolateral infarct ,age undetermined  Abnormal ECG    Confirmed by Bernardo Al MD ([id number]) on 12/21/2023 4:57:01 PM    Referred By: [email or web address]   SELF           Confirmed By:Bernardo Al MD         Transthoracic Echo (10/11/2022):    Interpretation Summary  · The left ventricle is moderately enlarged with eccentric hypertrophy and severely decreased systolic function.  · Severe left atrial enlargement.  · Intermediate central venous pressure (8 mmHg).  · The estimated PA systolic pressure is 26 mmHg.  · The estimated ejection fraction is 20%.  · Grade II left ventricular diastolic dysfunction.  · There is left ventricular global hypokinesis.  · Normal right ventricular size with normal right ventricular systolic function.  · Mild right atrial enlargement.  · Mild-to-moderate mitral regurgitation.  · Mild tricuspid regurgitation.      ASSESSMENT/PLAN:     Franco Cooper is a 59 y.o. male with recent admission for HF exacerbation and positive blood cultures presenting for MINISTERIO for further evaluation of moderate-severe MR.      Pre-op Assessment    I have reviewed the Patient Summary Reports.     I have reviewed the Nursing Notes. I have reviewed the NPO Status.   I have reviewed the Medications.     Review  of Systems  Anesthesia Hx:  No problems with previous Anesthesia               Denies Personal Hx of Anesthesia complications.                    Social:  Former Smoker       Hematology/Oncology:       -- Denies Anemia:                                  Cardiovascular:  Exercise tolerance: poor  Pacemaker Hypertension, well controlled Valvular problems/Murmurs, MR  Denies MI.     Dysrhythmias atrial fibrillation  CHF (LVEF 20%)  Denies Orthopnea.   hyperlipidemia FELDMAN  ECG has been reviewed.                          Pulmonary:  Denies Pneumonia  Denies COPD.  Denies Asthma.    Denies Recent URI. Sleep Apnea                Renal/:   Denies Chronic Renal Disease.                Hepatic/GI:      Denies GERD. Denies Liver Disease.            Musculoskeletal:         Spine Disorders: lumbar Disc disease           Neurological:    Denies CVA.    Denies Seizures.                                Endocrine:  Denies Diabetes.         Obesity / BMI > 30  Psych:  Denies Psychiatric History.                  Physical Exam  General: Well nourished, Cooperative and Alert    Airway:  Mallampati: I   Mouth Opening: Normal  TM Distance: Normal  Tongue: Normal  Neck ROM: Normal ROM    Dental:  Periodontal disease, Caps / Implants  No loose teeth per patient.   Chest/Lungs:  Clear to auscultation, Normal Respiratory Rate    Heart:  Rate: Normal        Anesthesia Plan  Type of Anesthesia, risks & benefits discussed:    Anesthesia Type: Gen Natural Airway  Intra-op Monitoring Plan: Standard ASA Monitors  Post Op Pain Control Plan: multimodal analgesia  Induction:  IV  Informed Consent: Informed consent signed with the Patient and all parties understand the risks and agree with anesthesia plan.  All questions answered.   ASA Score: 4  Day of Surgery Review of History & Physical: H&P Update referred to the surgeon/provider.  Anesthesia Plan Notes: Plan for general natural airway, discussed anesthetic risks, questions answered    Ready For  Surgery From Anesthesia Perspective.     .

## 2024-03-20 NOTE — ANESTHESIA POSTPROCEDURE EVALUATION
Anesthesia Post Evaluation    Patient: Franco Cooper    Procedure(s) Performed: Procedure(s) (LRB):  Transesophageal echo (MINISTERIO) intra-procedure log documentation (N/A)    Final Anesthesia Type: general      Patient location during evaluation: PACU  Patient participation: Yes- Able to Participate  Level of consciousness: awake and alert  Post-procedure vital signs: reviewed and stable  Pain management: adequate  Airway patency: patent    PONV status at discharge: No PONV  Anesthetic complications: no      Cardiovascular status: blood pressure returned to baseline  Respiratory status: unassisted  Hydration status: euvolemic  Follow-up not needed.              Vitals Value Taken Time   BP 99/53 03/20/24 1247   Temp 36.7 °C (98 °F) 03/20/24 1245   Pulse 65 03/20/24 1248   Resp 29 03/20/24 1248   SpO2 98 % 03/20/24 1248   Vitals shown include unvalidated device data.      No case tracking events are documented in the log.      Pain/Sujatha Score: Sujatha Score: 9 (3/20/2024 12:00 PM)

## 2024-03-20 NOTE — DISCHARGE INSTRUCTIONS
Medications:  -Continue to take your home medications as listed on your medication list after you are discharged.    New Medications:  -None    Diet  -You may resume oral intake after you are discharged, as long you have no swallowing difficulties.    Because you have received sedation for this procedure:  -Limit activity for the remainder of the day.  -Do not smoke for at least 6 hours and until you are fully awake and alert.  -Do not drink alcoholic beverage for 24 hours.  -Do not drive for 24 hours.  -Defer important decision making until the following day.     Go to the Emergency Department if you develop:   -Bleeding  -Weakness or numbness  -Visual, gait or speech disturbance  -New chest pain, palpitations, shortness of breath, rapid heart beat, or fainting  -Fever    Follow up:  -EKG in 1 week.  -Dr. Rodríguez for follow up on results. Call or message the office to schedule.

## 2024-03-20 NOTE — DISCHARGE SUMMARY
[facility] - Short Stay Cardiac Unit  Cardiology  Discharge Summary      Patient Name: Franco Cooper  MRN: [id number]  Admission Date: 3/20/2024  Hospital Length of Stay: 0 days  Discharge Date and Time:  03/20/2024 12:35 PM  Attending Physician: Brandyn Drew MD    Discharging Provider: Nisha Crocker PA-C  Primary Care Physician: Tomas Hidalgo MD    HPI:   Franco Cooper is a 59 year old male with a PMHx of HFrEF (NICM), VT s/p AICD, HLD, HTN, SHERYL, PAF who was admitted to outside facility at [facility] for CHF/pneumonia. Patient records from his hospitalization showed CTA which was negative for PE but had bilateral small pleural effusion and ill-defined ground-glass opacity in the right upper and lower lobes.  He also had blood culture that was positive for coag negative Staphylococcus. He had ID specialist (Dr Tovar) evaluate him who recommended completion of his antibiotics and recheck Bcx 1 week after discharge.  He had a 2nd set of blood culture that was negative. Echocardiogram showed severe LV dysfunction EF 25% which is unchanged and moderate-severe mitral regurgitation.  He was recommended to be evaluated for MINISTERIO. Patient presents today for MINISTERIO evaluation given bacteremia and moderate-to-severe mitral regurgitation seen on TTE.     Procedure(s) (LRB):  Transesophageal echo (MINISTERIO) intra-procedure log documentation (N/A)     Indwelling Lines/Drains at time of discharge:  Lines/Drains/Airways       None     Hospital Course:  Tolerated MINISTERIO without complication. MINISTERIO formal report not finalized. Follow up with Dr. Senthil Rodríguez outpatient for follow up on results.     Goals of Care Treatment Preferences:  Code Status: Full Code      Significant Diagnostic Studies: Cardiac Graphics: Echocardiogram:  MINISTERIO  MINISTERIO report pending; formal report to follow     Pending Diagnostic Studies:       None            There are no hospital problems to display for this patient.    No new Assessment & Plan notes have been  filed under this hospital service since the last note was generated.  Service: Cardiology      Discharged Condition: stable    Disposition: Home or Self Care    Follow Up:   Follow-up Information       Senthil Rodríguez MD. Schedule an appointment as soon as possible for a visit in 1 month(s).    Specialties: Interventional Cardiology, Nuclear Medicine, Cardiovascular Disease, Cardiology  Contact information:  [address] [address]  [phone]                           Patient Instructions:   No discharge procedures on file.  Medications:  Reconciled Home Medications:      Medication List        CONTINUE taking these medications      ALPRAZolam 0.25 MG tablet  Commonly known as: XANAX  Take 1 tablet (0.25 mg total) by mouth 2 (two) times daily as needed for Anxiety.     amiodarone 200 MG Tab  Commonly known as: PACERONE  TAKE 2 TABLETS BY MOUTH ONCE DAILY     apixaban 5 mg Tab  Commonly known as: ELIQUIS  Take 1 tablet (5 mg total) by mouth 2 (two) times daily.     aspirin 81 MG EC tablet  Commonly known as: ECOTRIN  Take 1 tablet (81 mg total) by mouth once. 1 Tablet, Delayed Release (E.C.) Oral Every day for 1 dose     atorvastatin 40 MG tablet  Commonly known as: LIPITOR  Take 1 tablet (40 mg total) by mouth once daily.     carvediloL 25 MG tablet  Commonly known as: COREG  Take 1 tablet (25 mg total) by mouth 2 (two) times a day.     digoxin 250 mcg tablet  Commonly known as: LANOXIN  Take 1 tablet (250 mcg total) by mouth once daily.     empagliflozin 10 mg tablet  Commonly known as: JARDIANCE  Take 1 tablet (10 mg total) by mouth once daily.     furosemide 40 MG tablet  Commonly known as: LASIX  Take 1 tablet (40 mg total) by mouth once daily. 1 Tablet Oral Every day     gabapentin 300 MG capsule  Commonly known as: NEURONTIN  Take 1 capsule (300 mg total) by mouth 3 (three) times daily.     losartan 50 MG tablet  Commonly known as: COZAAR  Take 1 tablet (50 mg total) by mouth once  daily.     meclizine 25 mg tablet  Commonly known as: ANTIVERT  Take 1 tablet (25 mg total) by mouth 3 (three) times daily as needed for Dizziness.     tadalafiL 5 MG tablet  Commonly known as: CIALIS  Take 1 tablet (5 mg total) by mouth daily as needed for Erectile Dysfunction.     tamsulosin 0.4 mg Cap  Commonly known as: FLOMAX  Take by mouth once daily.              Time spent on the discharge of patient: 35 minutes    Nisha Crocker PA-C  Cardiology  [facility] - Cardiology

## 2024-03-20 NOTE — NURSING
Pt brought to bay 8 in cath lab recovery. Pt bedside handoff with REYNA Horton and procedural RN for bedside report. Pt's vs taken and wnl. Pt is drowsy but oriented x4. Pt is free from s/s of pain and distress. This RN at bedside. Safety measures in place.

## 2024-03-20 NOTE — H&P
[facility]  MINISTERIO History and Physical      Franco oCoper  YOB: 1965  Medical Record Number:  [id number]  Attending Physician:  Brandyn Drew MD   Date of Admission: 3/20/2024       Hospital Day:  0  Current Principal Problem:  Mitral valve insufficiency     Patient information was obtained from patient and past medical records.    History     Cc: MINISTERIO for evaluation of mitral valve insufficiency    HPI  Franco Cooper is a 59 year old male with a PMHx of HFrEF (NICM), VT s/p AICD, HLD, HTN, SHERYL, PAF who was admitted to outside facility at [facility] for CHF/pneumonia. Patient records from his hospitalization showed CTA which was negative for PE but had bilateral small pleural effusion and ill-defined ground-glass opacity in the right upper and lower lobes.  He also had blood culture that was positive for coag negative Staphylococcus. He had ID specialist (Dr Tovar) evaluate him who recommended completion of his antibiotics and recheck Bcx 1 week after discharge.  He had a 2nd set of blood culture that was negative. Echocardiogram showed severe LV dysfunction EF 25% which is unchanged and moderate-severe mitral regurgitation.  He was recommended to be evaluated for MINISTERIO. Patient presents today for MINISTERIO evaluation given bacteremia and moderate-to-severe mitral regurgitation seen on TTE.    Today, in good spirits, accompanied by his wife     Anticoagulant/antiplatelets: ASA 81 mg/Eliquis 5 mg BID  Platelet count: 271  INR: 1.2    History of stroke:  no  Dysphagia or odynophagia:  no  Liver Disease, esophageal disease, or known varices:  no  Upper GI Bleeding:  no  Snoring:  yes   Sleep Apnea:  yes  Prior neck surgery or radiation:  no  History of anesthetic difficulties:  no  Family history of anesthetic difficulties:  no  Last oral intake: last pm   Able to move neck in all directions:  yes  Use of GLP-1:  no      Medications - Outpatient  Prior to Admission medications     Medication Sig Start Date End Date Taking? Authorizing Provider   ALPRAZolam (XANAX) 0.25 MG tablet Take 1 tablet (0.25 mg total) by mouth 2 (two) times daily as needed for Anxiety. 4/6/22 5/6/22  Senthil Rodríguez MD   amiodarone (PACERONE) 200 MG Tab TAKE 2 TABLETS BY MOUTH ONCE DAILY 1/9/24   Senthil Rodríguez MD   apixaban (ELIQUIS) 5 mg Tab Take 1 tablet (5 mg total) by mouth 2 (two) times daily. 11/30/23   Senthil Rodríguez MD   aspirin (ECOTRIN) 81 MG EC tablet Take 1 tablet (81 mg total) by mouth once. 1 Tablet, Delayed Release (E.C.) Oral Every day for 1 dose 4/4/22 4/14/22  Senthil Rodríguez MD   atorvastatin (LIPITOR) 40 MG tablet Take 1 tablet (40 mg total) by mouth once daily. 1/8/24   Senthil Rodríguez MD   carvediloL (COREG) 25 MG tablet Take 1 tablet (25 mg total) by mouth 2 (two) times a day. 1/8/24   Senthil Rodríguez MD   digoxin (LANOXIN) 250 mcg tablet Take 1 tablet (250 mcg total) by mouth once daily. 1/8/24   Senthil Rodríguez MD   empagliflozin (JARDIANCE) 10 mg tablet Take 1 tablet (10 mg total) by mouth once daily. 11/30/23   Senthil Rodríguez MD   furosemide (LASIX) 40 MG tablet Take 1 tablet (40 mg total) by mouth once daily. 1 Tablet Oral Every day 1/8/24   Senthil Rordíguez MD   gabapentin (NEURONTIN) 300 MG capsule Take 1 capsule (300 mg total) by mouth 3 (three) times daily.  Patient taking differently: Take 300 mg by mouth Daily. 12/13/23   Db Gomez MD   losartan (COZAAR) 50 MG tablet Take 1 tablet (50 mg total) by mouth once daily. 1/8/24   Senthil Rodríguez MD   meclizine (ANTIVERT) 25 mg tablet Take 1 tablet (25 mg total) by mouth 3 (three) times daily as needed for Dizziness. 1/8/24   Senthil Rodríguez MD   tadalafiL (CIALIS) 5 MG tablet Take 1 tablet (5 mg total) by mouth daily as needed for Erectile Dysfunction. 1/31/24 1/30/25  Senthil Rodríguez MD   tamsulosin (FLOMAX) 0.4 mg Cap Take by mouth once daily.    Provider, Historical       Medications - Current  Scheduled  "Meds:  Continuous Infusions:  PRN Meds:.      Allergies  Review of patient's allergies indicates:   Allergen Reactions    Ace inhibitors      cough    Adhesive      Skin rash       Past Medical History  Past Medical History:   Diagnosis Date    A-fib     AICD (automatic cardioverter/defibrillator) present     CHF (congestive heart failure)     DDD (degenerative disc disease), lumbar     HTN (hypertension)     Lumbar radiculopathy     Mixed hyperlipidemia     SHERYL (obstructive sleep apnea)     Other cardiomyopathies     TIA (transient ischemic attack)     VT (ventricular tachycardia)        Past Surgical History  Past Surgical History:   Procedure Laterality Date    JOINT REPLACEMENT      TONSILLECTOMY           Social History  Social History     Socioeconomic History    Marital status:    Tobacco Use    Smoking status: Former     Types: Cigars    Smokeless tobacco: Never   Substance and Sexual Activity    Alcohol use: Yes     Comment: occasionally    Drug use: Never    Sexual activity: Not Currently       ROS  10 point ROS performed and negative except as stated in HPI     Physical Examination     Vital Signs  24 Hour VS Range    Temp:  [97.7 °F (36.5 °C)]   Pulse:  [70]   Resp:  [18]   BP: ()/(58)   SpO2:  [94 %]   No intake or output data in the 24 hours ending 03/20/24 1029      Physical Exam:   Constitutional: no acute distress  HEENT: NCAT, EOMI, no scleral icterus  Cardiovascular: Regular rate and rhythm   Pulmonary: Normal respiratory effort   Abdomen: nontender, non-distended   Neuro: alert and oriented, no focal deficits  Extremities: warm, no edema   MSK: no deformities  Integument: intact, no rashes       Data       No results for input(s): "WBC", "HGB", "HCT", "PLT" in the last 168 hours.     No results for input(s): "PROTIME", "INR" in the last 168 hours.     No results for input(s): "NA", "K", "CL", "CO2", "BUN", "CREATININE", "ANIONGAP", "CALCIUM" in the last 168 hours.     No results for " "input(s): "PROT", "ALBUMIN", "BILITOT", "ALKPHOS", "AST", "ALT" in the last 168 hours.     No results for input(s): "TROPONINI" in the last 168 hours.     BNP (pg/mL)   Date Value   03/04/2024 715 (H)   12/20/2023 292 (H)       No results for input(s): "LABBLOO" in the last 168 hours.       Assessment & Plan     #Mitral valve insufficiency   -Franco Cooper is a 59 year old male who presents for MINISTERIO for evaluation of moderate-to-severe mitral regurgitation  -on Eliquis for CVA prophylaxis, last dose this morning     TTE 02/22/24 ([facility])  Summary:    1. Estimated left ventricular ejection fraction is 20 to 25%.    2. Severely decreased left ventricular systolic function.    3. Left atrium is moderately dilated.    4. Severely dilated left ventricle.    5. LV diastolic function is mildly abnormal (Grade I).    6. Abnormal left ventricular strain (-7.0 %). -6.5 %.    7. Defibrillator lead is seen in the right ventricle.    8. Dilated aortic root.    9. Moderate to severe mitral valve regurgitation.   10. Right ventricular systolic function is normal.     TTE 01/06/23 ([facility])  Summary:    1. Severely decreased left ventricular systolic function. Left ventricular ejection fraction is estimated to be less than 20%     2. Severely dilated left ventricle.    3. Mildly dilated left atrium.    4. Segmental wall motion abnormalities as noted above.    5. Diastolic dysfunction.    6. Moderate mitral regurgitation.    7. Negative bubble study.    8. Pacer wire in the right ventricle.     TTE 10/11/22  The left ventricle is moderately enlarged with eccentric hypertrophy and severely decreased systolic function.  Severe left atrial enlargement.  Intermediate central venous pressure (8 mmHg).  The estimated PA systolic pressure is 26 mmHg.  The estimated ejection fraction is 20%.  Grade II left ventricular diastolic dysfunction.  There is left ventricular global hypokinesis.  Normal right ventricular size with normal right " ventricular systolic function.  Mild right atrial enlargement.  Mild-to-moderate mitral regurgitation.  Mild tricuspid regurgitation.    -No absolute contraindications of esophageal stricture, tumor, perforation, laceration,or diverticulum and/or active GI bleed.  -The risks, benefits & alternatives of the procedure were explained to the patient.   -The risks of transesophageal echo include but are not limited to:  Dental trauma, esophageal trauma/perforation, bleeding, laryngospasm/brochospasm, aspiration, sore throat/hoarseness, & dislodgement of the endotracheal tube/nasogastric tube (where applicable).    -The risks of moderate sedation include hypotension, respiratory depression, arrhythmias, bronchospasm, & death.    -Informed consent was obtained. The patient is agreeable to proceed with the procedure and all questions and concerns addressed.    Case was discussed with an attending physician in echocardiography lab prior to procedure.    Nisha Crocker PA-C  [facility]

## 2024-03-27 PROBLEM — Z96.642 HISTORY OF TOTAL HIP REPLACEMENT, LEFT: Status: ACTIVE | Noted: 2020-12-03

## 2024-03-27 PROBLEM — I50.43 ACUTE ON CHRONIC COMBINED SYSTOLIC AND DIASTOLIC CONGESTIVE HEART FAILURE: Chronic | Status: ACTIVE | Noted: 2022-01-18

## 2024-03-27 PROBLEM — J18.9 COMMUNITY ACQUIRED PNEUMONIA OF RIGHT LUNG: Status: RESOLVED | Noted: 2024-02-21 | Resolved: 2024-03-27

## 2024-03-27 PROBLEM — Z45.02 AICD AT END OF BATTERY LIFE: Status: RESOLVED | Noted: 2019-12-04 | Resolved: 2024-03-27

## 2024-03-27 PROBLEM — Z86.79 HISTORY OF VENTRICULAR TACHYCARDIA: Chronic | Status: ACTIVE | Noted: 2022-01-18

## 2024-03-27 PROBLEM — E66.01 MORBID OBESITY WITH BMI OF 40.0-44.9, ADULT: Status: RESOLVED | Noted: 2023-02-07 | Resolved: 2024-03-27

## 2024-03-27 PROBLEM — N13.30 HYDRONEPHROSIS OF RIGHT KIDNEY: Status: RESOLVED | Noted: 2021-07-19 | Resolved: 2024-03-27

## 2024-03-27 PROBLEM — I47.20 VT (VENTRICULAR TACHYCARDIA): Status: RESOLVED | Noted: 2018-06-20 | Resolved: 2024-03-27

## 2024-03-27 PROBLEM — R20.0 NUMBNESS: Status: RESOLVED | Noted: 2023-01-06 | Resolved: 2024-03-27

## 2024-03-27 PROBLEM — E79.0 HYPERURICEMIA: Status: RESOLVED | Noted: 2021-08-02 | Resolved: 2024-03-27

## 2024-03-27 PROBLEM — I21.4 NSTEMI (NON-ST ELEVATED MYOCARDIAL INFARCTION): Status: RESOLVED | Noted: 2024-02-21 | Resolved: 2024-03-27

## 2024-03-28 PROBLEM — R07.89 OTHER CHEST PAIN: Status: ACTIVE | Noted: 2024-03-28

## 2024-03-28 PROBLEM — I34.0 SEVERE MITRAL REGURGITATION: Status: ACTIVE | Noted: 2024-03-28

## 2024-03-28 PROBLEM — I48.0 PAROXYSMAL ATRIAL FIBRILLATION: Status: ACTIVE | Noted: 2024-01-31

## 2024-03-28 NOTE — PROVIDER PROGRESS NOTES - EMERGENCY DEPT.
Patient was signed out to me by Dr. Katz pending laboratory studies and admission. Briefly, this is a 59-year-old male with a history of CHF presenting with chest pain and shortness of breath, concern for CHF exacerbation on exam, given IV Lasix.    Data:  Results for orders placed or performed during the hospital encounter of 03/27/24   CBC auto differential   Result Value Ref Range    WBC 6.46 3.90 - 12.70 K/uL    RBC 4.76 4.60 - 6.20 M/uL    Hemoglobin 12.5 (L) 14.0 - 18.0 g/dL    Hematocrit 39.4 (L) 40.0 - 54.0 %    MCV 83 82 - 98 fL    MCH 26.3 (L) 27.0 - 31.0 pg    MCHC 31.7 (L) 32.0 - 36.0 g/dL    RDW 14.2 11.5 - 14.5 %    Platelets 274 150 - 450 K/uL    MPV 9.8 9.2 - 12.9 fL    Immature Granulocytes 0.2 0.0 - 0.5 %    Gran # (ANC) 3.3 1.8 - 7.7 K/uL    Immature Grans (Abs) 0.01 0.00 - 0.04 K/uL    Lymph # 2.1 1.0 - 4.8 K/uL    Mono # 0.8 0.3 - 1.0 K/uL    Eos # 0.2 0.0 - 0.5 K/uL    Baso # 0.04 0.00 - 0.20 K/uL    nRBC 0 0 /100 WBC    Gran % 51.1 38.0 - 73.0 %    Lymph % 33.1 18.0 - 48.0 %    Mono % 12.5 4.0 - 15.0 %    Eosinophil % 2.5 0.0 - 8.0 %    Basophil % 0.6 0.0 - 1.9 %    Differential Method Automated    Comprehensive metabolic panel   Result Value Ref Range    Sodium 142 136 - 145 mmol/L    Potassium 3.6 3.5 - 5.1 mmol/L    Chloride 105 95 - 110 mmol/L    CO2 27 23 - 29 mmol/L    Glucose 104 70 - 110 mg/dL    BUN 19 6 - 20 mg/dL    Creatinine 1.1 0.5 - 1.4 mg/dL    Calcium 8.7 8.7 - 10.5 mg/dL    Total Protein 6.9 6.0 - 8.4 g/dL    Albumin 2.7 (L) 3.5 - 5.2 g/dL    Total Bilirubin 0.5 0.1 - 1.0 mg/dL    Alkaline Phosphatase 88 55 - 135 U/L    AST 23 10 - 40 U/L    ALT 24 10 - 44 U/L    eGFR >60.0 >60 mL/min/1.73 m^2    Anion Gap 10 8 - 16 mmol/L   Troponin I #1   Result Value Ref Range    Troponin I 0.051 (H) 0.000 - 0.026 ng/mL   B-Type natriuretic peptide (BNP)   Result Value Ref Range     (H) 0 - 99 pg/mL      Imaging Results              X-Ray Chest AP Portable (Final result)  Result  "time 03/27/24 20:38:33      Final result by Giacomo Aguiar MD (03/27/24 20:38:33)                   Impression:      Cardiomegaly and mild interstitial edema.  No large focal consolidation identified on this single view.      Electronically signed by: Giacomo Aguiar MD  Date:    03/27/2024  Time:    20:38               Narrative:    EXAMINATION:  XR CHEST AP PORTABLE    CLINICAL HISTORY:  Provided history is "Chest Pain;  ".    TECHNIQUE:  One view of the chest.    COMPARISON:  12/20/2023.    FINDINGS:  Cardiac wires overlie the chest.  Cardiomediastinal silhouette is enlarged and similar to the prior study.  Lung volumes are relatively low, accentuating basilar markings.  Left chest wall AICD is again present.  Central vascular congestion with minimal perihilar interstitial densities.  No confluent area of consolidation.  No large pleural effusion.  No pneumothorax.                                       MDM:   BNP elevated with chest x-ray showing pulmonary edema.  Troponin elevated though stable at baseline.  Plan for admission to hospital medicine for acute CHF exacerbation, further diuresis, ACS rule out.  "

## 2024-03-28 NOTE — HOSPITAL COURSE
Admitted with chest pain and dyspnea on exertion.  CXR showing signs of volume overload, improved with IV diuresis.  Numerous admissions likely related to severe MR, most recently demonstrated on MINISTERIO from 03/20.  Interventional Cardiology consulted, recommending HTS team for GDMT optimization as patient will need to be on at least 1 month maximally tolerated of GDMT prior to potential mitraclip. Introducing GDMT as tolerated. LHC planned for 4/1 for further workup leading to clip, showed normal coronaries. Has f/u with [facility], HTS, and Interventional Cardiology. Referred to EP for CRT upgrade with new LBBB. Changed digoxin dosing due to elevated dig level; cont monitoring as outpt.

## 2024-03-28 NOTE — ED PROVIDER NOTES
Encounter Date: 3/27/2024       History     Chief Complaint   Patient presents with    Chest Pain     Midsternal CP radiating to left chest and SOB x 1 day. Hx defibrillator      59-year-old male, history of CHF with an EF of 15-20%, ICD, hypertension, AFib on anticoagulation, complaining of chest tightness and shortness of breath, onset around 11:00 a.m. today.  Symptoms are worse with exertion.  Patient reports that he has had these symptoms multiple times in the past.  He has not gained weight.  He reports compliance with his Lasix and says that he actually started taking b.i.d. dosing in the last month.  He did not take his Lasix today however because he would doctor's appointments.    CTA PE study on 2/20 negative at OSH    The history is provided by the patient and the spouse.     Review of patient's allergies indicates:   Allergen Reactions    Ace inhibitors      cough    Adhesive      Skin rash     Past Medical History:   Diagnosis Date    A-fib     AICD (automatic cardioverter/defibrillator) present     AICD at end of battery life 12/04/2019    Formatting of this note might be different from the original.   Added automatically from request for surgery [id number]    CHF (congestive heart failure)     Community acquired pneumonia of right lung 02/21/2024    DDD (degenerative disc disease), lumbar     HTN (hypertension)     Hydronephrosis of right kidney 07/19/2021    Hyperuricemia 08/02/2021    Lumbar radiculopathy     Mixed hyperlipidemia     NSTEMI (non-ST elevated myocardial infarction) 02/21/2024    Numbness 01/06/2023    SHERYL (obstructive sleep apnea)     Other cardiomyopathies     TIA (transient ischemic attack)     VT (ventricular tachycardia)      Past Surgical History:   Procedure Laterality Date    CORONARY ANGIOGRAPHY N/A 4/1/2024    Procedure: ANGIOGRAM, CORONARY ARTERY;  Surgeon: Moe Marroquin MD;  Location: [facility] CATH LAB;  Service: Cardiology;  Laterality: N/A;    ECHOCARDIOGRAM,TRANSESOPHAGEAL  N/A 3/20/2024    Procedure: Transesophageal echo (MINISTERIO) intra-procedure log documentation;  Surgeon: Provider, Haylee Diagnostic;  Location: [facility] EP LAB;  Service: Cardiology;  Laterality: N/A;    JOINT REPLACEMENT      TONSILLECTOMY       History reviewed. No pertinent family history.  Social History     Tobacco Use    Smoking status: Former     Types: Cigars    Smokeless tobacco: Never   Substance Use Topics    Alcohol use: Yes     Comment: occasionally    Drug use: Never     Review of Systems    Physical Exam     Initial Vitals [03/27/24 1937]   BP Pulse Resp Temp SpO2   116/65 68 15 97.3 °F (36.3 °C) 98 %      MAP       --         Physical Exam    Nursing note and vitals reviewed.  Constitutional: Vital signs are normal. He appears well-developed and well-nourished. He is not diaphoretic.  Non-toxic appearance. He does not appear ill. No distress.   HENT:   Head: Normocephalic and atraumatic.   Mouth/Throat: Mucous membranes are normal. Mucous membranes are not dry.   Eyes: Conjunctivae and lids are normal.   Neck: Neck supple.   Normal range of motion.  Cardiovascular:  Normal rate, regular rhythm and normal heart sounds.           Pulmonary/Chest: Breath sounds normal. No respiratory distress.   Musculoskeletal:         General: No edema.      Cervical back: Normal range of motion and neck supple.     Neurological: He is alert and oriented to person, place, and time. He has normal strength.   Skin: Skin is warm, dry and intact. No pallor.   Psychiatric: He has a normal mood and affect. His speech is normal and behavior is normal.         ED Course   Procedures  Labs Reviewed   CBC W/ AUTO DIFFERENTIAL - Abnormal; Notable for the following components:       Result Value    Hemoglobin 12.5 (*)     Hematocrit 39.4 (*)     MCH 26.3 (*)     MCHC 31.7 (*)     All other components within normal limits   COMPREHENSIVE METABOLIC PANEL - Abnormal; Notable for the following components:    Albumin 2.7 (*)     All other  components within normal limits   TROPONIN I - Abnormal; Notable for the following components:    Troponin I 0.051 (*)     All other components within normal limits   B-TYPE NATRIURETIC PEPTIDE - Abnormal; Notable for the following components:     (*)     All other components within normal limits   TROPONIN I - Abnormal; Notable for the following components:    Troponin I 0.037 (*)     All other components within normal limits   TROPONIN ISTAT        ECG Results              EKG 12-lead (Final result)        Collection Time Result Time QRS Duration OHS QTC Calculation    03/27/24 20:10:38 03/28/24 09:37:09 186 486                     Final result by Interface, Lab In [facility] (03/28/24 09:37:13)                   Narrative:    Test Reason : R07.9,    Vent. Rate : 070 BPM     Atrial Rate : 070 BPM     P-R Int : 228 ms          QRS Dur : 186 ms      QT Int : 450 ms       P-R-T Axes : 043 -35 040 degrees     QTc Int : 486 ms    Sinus rhythm with 1st degree A-V block  Possible Left atrial enlargement  Left bundle branch block  Abnormal ECG  When compared with ECG of 27-MAR-2024 19:42,  No significant change was found  Confirmed by MARICRUZ BERRY MD ([id number]) on 3/28/2024 9:37:03 AM    Referred By: [email or web address]   SELF           Confirmed By:MARICRUZ BERRY MD                                     EKG 12-lead (Final result)        Collection Time Result Time QRS Duration OHS QTC Calculation    03/27/24 19:42:23 03/28/24 09:29:47 190 481                     Final result by Interface, Lab In [facility] (03/28/24 09:29:51)                   Narrative:    Test Reason : R07.9,    Vent. Rate : 072 BPM     Atrial Rate : 072 BPM     P-R Int : 228 ms          QRS Dur : 190 ms      QT Int : 440 ms       P-R-T Axes : 053 -22 017 degrees     QTc Int : 481 ms    Sinus rhythm with 1st degree A-V block  Possible Left atrial enlargement  Left bundle branch block    Abnormal ECG  When compared with ECG of 20-DEC-2023 15:04,  No significant  "change was found  Confirmed by MARICRUZ BERRY MD ([id number]) on 3/28/2024 9:29:41 AM    Referred By: [email or web address]   SELF           Confirmed By:MARICRUZ BERRY MD                                  Imaging Results              X-Ray Chest AP Portable (Final result)  Result time 03/27/24 20:38:33      Final result by Giacomo Aguiar MD (03/27/24 20:38:33)                   Impression:      Cardiomegaly and mild interstitial edema.  No large focal consolidation identified on this single view.      Electronically signed by: Giacomo Aguiar MD  Date:    03/27/2024  Time:    20:38               Narrative:    EXAMINATION:  XR CHEST AP PORTABLE    CLINICAL HISTORY:  Provided history is "Chest Pain;  ".    TECHNIQUE:  One view of the chest.    COMPARISON:  12/20/2023.    FINDINGS:  Cardiac wires overlie the chest.  Cardiomediastinal silhouette is enlarged and similar to the prior study.  Lung volumes are relatively low, accentuating basilar markings.  Left chest wall AICD is again present.  Central vascular congestion with minimal perihilar interstitial densities.  No confluent area of consolidation.  No large pleural effusion.  No pneumothorax.                                       Medications   0.9%  NaCl infusion (has no administration in time range)   0.9%  NaCl infusion ( Intravenous New Bag 4/1/24 1409)   furosemide injection 40 mg (40 mg Intravenous Given 3/27/24 2100)   aspirin EC tablet 81 mg (81 mg Oral Given 3/28/24 0115)   potassium chloride SA CR tablet 40 mEq (40 mEq Oral Given 3/28/24 0116)   diphenhydrAMINE capsule 50 mg (50 mg Oral Given 4/1/24 0906)     Medical Decision Making  DDx for chest pain would be broad, including but not limited to serious diagnoses such as ACS, PE, aortic dissection, pericarditis, myocarditis, pneumothorax, pneumonia or pleural effusion, and esophageal rupture.  Other less serious problems such as panic attack, muscle strain, costochrondritis, pleurisy, GERD, and esophageal spasm also " considered.      At this time, my greatest suspicion is for uncertain  If ACS considered, patient's HEART score is elevated    ED work-up will include the following:  -EKG: no acute ischemia  -Labs: CBC, CMP, troponin, BNP  -Imaging: CXR  -Cardiac monitoring  -Medications: lasix IV  -Disposition: likely admit      Amount and/or Complexity of Data Reviewed  External Data Reviewed: radiology and notes.  Labs: ordered. Decision-making details documented in ED Course.  Radiology: ordered and independent interpretation performed. Decision-making details documented in ED Course.  ECG/medicine tests: ordered and independent interpretation performed. Decision-making details documented in ED Course.    Risk  Prescription drug management.  Decision regarding hospitalization.              Attending Attestation:         Attending Critical Care:   Critical Care Times:   ==============================================================  Total Critical Care Time - exclusive of procedural time: 30 minutes.  ==============================================================  Critical care was necessary to treat or prevent imminent or life-threatening deterioration of the following conditions: congestive heart failure.   Critical care was time spent personally by me on the following activities: obtaining history from patient or relative, examination of patient, review of old charts, review of x-rays / CT sent with the patient, ordering lab, x-rays, and/or EKG, development of treatment plan with patient or relative, ordering and performing treatments and interventions, evaluation of patient's response to treatment, discussion with consultants and re-evaluation of patient's conition.   Critical Care Condition: potentially life-threatening           ED Course as of 04/03/24 0613   Wed Mar 27, 2024   1946 No STEMI on EKG [NN]      ED Course User Index  [NN] Nilda Doran MD                           Clinical Impression:  Final  diagnoses:  [R07.9] Chest pain (Primary)          ED Disposition Condition    Admit Stable                Karen Katz MD  04/03/24 0608

## 2024-03-28 NOTE — SUBJECTIVE & OBJECTIVE
Interval History: Seen this AM at bedside. Shortness of breath improved along with chest tightness. Noticed that he is not urinating as much as expected in relation to previous hospitalizations. Denies fevers, chills, cough, abdominal pain    Review of Systems  Objective:     Vital Signs (Most Recent):  Temp: 97.6 °F (36.4 °C) (03/28/24 0401)  Pulse: 77 (03/28/24 1117)  Resp: 18 (03/28/24 1015)  BP: (!) 103/59 (03/28/24 1015)  SpO2: 96 % (03/28/24 0817) Vital Signs (24h Range):  Temp:  [97.3 °F (36.3 °C)-98.6 °F (37 °C)] 97.6 °F (36.4 °C)  Pulse:  [57-77] 77  Resp:  [15-20] 18  SpO2:  [95 %-100 %] 96 %  BP: (101-143)/(51-73) 103/59     Weight: 119 kg (262 lb 5.6 oz)  Body mass index is 37.64 kg/m².    Intake/Output Summary (Last 24 hours) at 3/28/2024 1353  Last data filed at 3/28/2024 1021  Gross per 24 hour   Intake 360 ml   Output 225 ml   Net 135 ml         Physical Exam  Vitals and nursing note reviewed.   Constitutional:       General: He is not in acute distress.     Appearance: Normal appearance. He is not ill-appearing or toxic-appearing.   HENT:      Head: Normocephalic and atraumatic.   Eyes:      Pupils: Pupils are equal, round, and reactive to light.   Cardiovascular:      Rate and Rhythm: Normal rate and regular rhythm.      Heart sounds: Murmur heard.   Pulmonary:      Effort: Pulmonary effort is normal. No respiratory distress.      Breath sounds: Normal breath sounds. No wheezing or rales.   Abdominal:      General: Bowel sounds are normal. There is no distension.      Palpations: Abdomen is soft.      Tenderness: There is no abdominal tenderness. There is no guarding.   Musculoskeletal:      Cervical back: Normal range of motion.      Right lower leg: No edema.      Left lower leg: No edema.   Skin:     General: Skin is warm and dry.   Neurological:      General: No focal deficit present.      Mental Status: He is alert and oriented to person, place, and time.   Psychiatric:         Mood and  Affect: Mood normal.         Behavior: Behavior normal.             Significant Labs: All pertinent labs within the past 24 hours have been reviewed.    Significant Imaging: I have reviewed all pertinent imaging results/findings within the past 24 hours.

## 2024-03-28 NOTE — ASSESSMENT & PLAN NOTE
Patient is identified as having Combined Systolic and Diastolic heart failure that is Acute on chronic. CHF is currently uncontrolled due to Pulmonary edema/pleural effusion on CXR.     . Continue Beta Blocker, ACE/ARB, and Furosemide and monitor clinical status closely. Monitor on telemetry. Patient is off CHF pathway.  Monitor strict Is&Os and daily weights.  Place on fluid restriction of 1.5 L.     Given recent admission and newer finding of severe MR - interventional cardiology consult placed to evalaute for his valvular disease as it may be contributing to more frequent admissions. Continue to stress to patient importance of self efficacy and  on diet for CHF. Last BNP reviewed- and noted below   Recent Labs   Lab 03/27/24 2058   *

## 2024-03-28 NOTE — PROGRESS NOTES
"[facility]) nurse navigator notified of HFT[facility] candidate in need of education and introduction to 4-6 week program.      PT aao x 3 while sitting on the couch. Introduced self to pt as H[facility] nurse navigator.     Patient given "[facility] Home Care Guide" which includes "Daily weight and symptom tracker".  Encouraged pt  to review information.      Reviewed the following key points of HFT[facility] program with pt:              1.) Take your medications as directed. Call Ms Mandy if any health Care Professional changes your Heart/Fluid medications.               2.) Weight yourself daily. Daily Dry Weights. Upon waking ,empty your bladder, weigh with as little clothes as possible before eating/drinking. Record weight in Symptom Tracker and compare these weights for fluid gain. Weight gain overnight of 3-4 lbs is Fluid, also a gain of 5 lbs in 3 days is Fluid as well. Call US!              3.) Follow low salt and limited fluid diet. Salt/Sodium Restriction 8422-6395 mg, see page 4. Sodium makes you hold onto Fluid. High Sodium Foods;Deli Meat/Cheese, Sausages/Hot Dogs, Fast Food/Restaurant Food, Anything in a box, bottle or can. Cook with Fresh or Frozen ingredients and use seasonings that are labeled NO SALT. Check Portion Sizes, Salt is reported for 1 portion. A can may contain 2-3 portions. Fluid Restriction 1.5 -2 Liters/Day, see page 6, measure all of your Fluid, the milk in your cereal, broth in your soup and ice cream because anything that melts at room temp is a liquid.              4.) Stop smoking and start exercising. Brisk walking is good, don't walk like you are going to the [employer] and DON"T WALK IN THE HEAT! Start low and increase as tolerated. Remember if you don't use it you lose it.              5.) Go to your appointments and call your team. Have your weight and BP/P ready when we call to do the Phone Check ins and call us if you have fluid gain " or questions      Pt reminded to follow Symptom tracker and to call at the onset of symptoms according to tracker.     Reviewed plan for follow up once discharged to include phone calls, in person and virtual visits to assist pt optimizing their heart failure medication regimen and encouraging healthy lifestyle modifications.  Reminded pt that program will assist them over the next 4-6 weeks and then patient will be transferred to long term care provider .  Reminded pt how to contact HFT[facility] navigator via phone and or via [employer].     Pt instructed appointment will be printed on hospital discharge paperwork.     Pt also reminded RN will call 48-72 hours after discharge to check on them.     PT can verbalize read back of information given.  Encouraged pt and family to read over information often and contact team with any questions or concerns.     PT understands that he will be bridged to his HTS/CHF appt with Dr. Doe on 4-15-24  PT needs a 0930 Lab appt and a 1000 appt with Melinda Galvan

## 2024-03-28 NOTE — ASSESSMENT & PLAN NOTE
59 M with NICM EF 25-20%, VT, HTN, SHERYL and now with severe MR. Has had moderate to severe MR since 1/2023. Seen decrease in exercise tolerance since August 2023, but now is only able to walk 100ft before becoming SOB. GDMT has not been fully optimized for some time now. Admitted for CHF exacerbation and diuresising 4L total per the patient on IV lasix 80mg BID. IC has been consulted for  possible MV clip but would like HTS to assist in optimial GDMT.     NYHA III / ACC stage C   -Continue with coreg 25mg BID and empagliflozin 10mg daily   -Recommend stopping losartan and starting entresto 24/26 BID   -Would start aldactone 12.5mg daily  -Diruresis with IV lasix 80mg BID and transition to PO once volume optimized per general cardiology team  -Review of Ecg shows the patient has had a LBBB with wide QRS since 2023 most recent was 186; would benefit from device upgrade to CTR-D after optimization.   -Please placed out patient referral to EP  -Please place an out patient referral for HTS and we will follow with the patient in the clinic   -He will need an outpt sarcoid PET scan as well

## 2024-03-28 NOTE — SUBJECTIVE & OBJECTIVE
Past Medical History:   Diagnosis Date    A-fib     AICD (automatic cardioverter/defibrillator) present     AICD at end of battery life 12/04/2019    Formatting of this note might be different from the original.   Added automatically from request for surgery [id number]    CHF (congestive heart failure)     Community acquired pneumonia of right lung 02/21/2024    DDD (degenerative disc disease), lumbar     HTN (hypertension)     Hydronephrosis of right kidney 07/19/2021    Hyperuricemia 08/02/2021    Lumbar radiculopathy     Mixed hyperlipidemia     NSTEMI (non-ST elevated myocardial infarction) 02/21/2024    Numbness 01/06/2023    SHERYL (obstructive sleep apnea)     Other cardiomyopathies     TIA (transient ischemic attack)     VT (ventricular tachycardia)        Past Surgical History:   Procedure Laterality Date    ECHOCARDIOGRAM,TRANSESOPHAGEAL N/A 3/20/2024    Procedure: Transesophageal echo (MINISTERIO) intra-procedure log documentation;  Surgeon: Provider, Dosc Diagnostic;  Location: [facility] EP LAB;  Service: Cardiology;  Laterality: N/A;    JOINT REPLACEMENT      TONSILLECTOMY         Review of patient's allergies indicates:   Allergen Reactions    Ace inhibitors      cough    Adhesive      Skin rash       Current Facility-Administered Medications   Medication    acetaminophen tablet 650 mg    aluminum-magnesium hydroxide-simethicone 200-200-20 mg/5 mL suspension 30 mL    amiodarone tablet 400 mg    apixaban tablet 5 mg    atorvastatin tablet 40 mg    carvediloL tablet 25 mg    digoxin tablet 250 mcg    empagliflozin (Jardiance) tablet 10 mg    furosemide injection 80 mg    gabapentin capsule 300 mg    losartan tablet 50 mg    magnesium sulfate 2g in water 50mL IVPB (premix)    meclizine tablet 25 mg    melatonin tablet 6 mg    naloxone 0.4 mg/mL injection 0.02 mg    nitroGLYCERIN SL tablet 0.4 mg    polyethylene glycol packet 17 g    prochlorperazine injection Soln 5 mg    sodium chloride 0.9% flush 10 mL    tamsulosin 24 hr  capsule 0.4 mg     Family History    None       Tobacco Use    Smoking status: Former     Types: Cigars    Smokeless tobacco: Never   Substance and Sexual Activity    Alcohol use: Yes     Comment: occasionally    Drug use: Never    Sexual activity: Not Currently     Review of Systems   Constitutional: Negative.    HENT: Negative.     Eyes: Negative.    Respiratory:  Positive for cough, chest tightness, shortness of breath and wheezing.    Cardiovascular:  Positive for chest pain. Negative for palpitations and leg swelling.   Gastrointestinal: Negative.    Endocrine: Negative.    Genitourinary: Negative.    Musculoskeletal: Negative.    Allergic/Immunologic: Negative.    Neurological: Negative.    Hematological: Negative.    Psychiatric/Behavioral: Negative.       Objective:     Vital Signs (Most Recent):  Temp: 97.6 °F (36.4 °C) (03/28/24 0401)  Pulse: 77 (03/28/24 1117)  Resp: 18 (03/28/24 1015)  BP: (!) 103/59 (03/28/24 1015)  SpO2: 96 % (03/28/24 0817) Vital Signs (24h Range):  Temp:  [97.3 °F (36.3 °C)-98.6 °F (37 °C)] 97.6 °F (36.4 °C)  Pulse:  [57-77] 77  Resp:  [15-20] 18  SpO2:  [95 %-100 %] 96 %  BP: (101-143)/(51-73) 103/59     Patient Vitals for the past 72 hrs (Last 3 readings):   Weight   03/28/24 0114 119 kg (262 lb 5.6 oz)   03/27/24 1937 122.5 kg (270 lb)     Body mass index is 37.64 kg/m².      Intake/Output Summary (Last 24 hours) at 3/28/2024 1256  Last data filed at 3/28/2024 1021  Gross per 24 hour   Intake 360 ml   Output 225 ml   Net 135 ml          Physical Exam  Vitals and nursing note reviewed.   Constitutional:       General: He is not in acute distress.     Appearance: Normal appearance. He is obese. He is not ill-appearing.   HENT:      Head: Normocephalic.      Nose: Nose normal.   Eyes:      Extraocular Movements: Extraocular movements intact.      Conjunctiva/sclera: Conjunctivae normal.      Pupils: Pupils are equal, round, and reactive to light.   Cardiovascular:      Rate and  "Rhythm: Normal rate and regular rhythm.      Heart sounds: No murmur heard.     No friction rub. No gallop.   Pulmonary:      Effort: No respiratory distress.      Breath sounds: Normal breath sounds. No wheezing.   Abdominal:      General: Bowel sounds are normal. There is no distension.      Palpations: Abdomen is soft.      Tenderness: There is no abdominal tenderness.   Musculoskeletal:         General: Normal range of motion.      Cervical back: Normal range of motion.      Right lower leg: No edema.      Left lower leg: No edema.   Skin:     General: Skin is warm.      Capillary Refill: Capillary refill takes 2 to 3 seconds.   Neurological:      General: No focal deficit present.      Mental Status: He is alert and oriented to person, place, and time.            Significant Labs:  CBC:  Recent Labs   Lab 03/27/24 2058   WBC 6.46   RBC 4.76   HGB 12.5*   HCT 39.4*      MCV 83   MCH 26.3*   MCHC 31.7*     BNP:  Recent Labs   Lab 03/27/24 2058   *     CMP:  Recent Labs   Lab 03/27/24 2058 03/28/24 0424    101   CALCIUM 8.7 8.7   ALBUMIN 2.7* 2.5*   PROT 6.9 6.4    141   K 3.6 4.1   CO2 27 26    108   BUN 19 20   CREATININE 1.1 1.1   ALKPHOS 88 79   ALT 24 26   AST 23 27   BILITOT 0.5 0.7      Coagulation:   Recent Labs   Lab 03/28/24 0424   INR 1.1   APTT 26.5     LDH:  No results for input(s): "LDH" in the last 72 hours.  Microbiology:  Microbiology Results (last 7 days)       ** No results found for the last 168 hours. **            I have reviewed all pertinent labs within the past 24 hours.    Diagnostic Results:  I have reviewed all pertinent imaging results/findings within the past 24 hours.    "

## 2024-03-28 NOTE — ASSESSMENT & PLAN NOTE
Likely secondary severe MR based on MINISTERIO    Recommendations  HTS consulted for GDMT and diuresis management  Will need to be on at least 1 month of maximally tolerated GDMT prior to potential mitraclip  Plan for LHC on 4/1 while inpatient unless patient and team feel strongly about discharge prior to this. NPO at midnight on Sunday  Care discussed with Dr. Marroquin.

## 2024-03-28 NOTE — SUBJECTIVE & OBJECTIVE
Past Medical History:   Diagnosis Date    A-fib     AICD (automatic cardioverter/defibrillator) present     AICD at end of battery life 12/04/2019    Formatting of this note might be different from the original.   Added automatically from request for surgery [id number]    CHF (congestive heart failure)     Community acquired pneumonia of right lung 02/21/2024    DDD (degenerative disc disease), lumbar     HTN (hypertension)     Hydronephrosis of right kidney 07/19/2021    Hyperuricemia 08/02/2021    Lumbar radiculopathy     Mixed hyperlipidemia     NSTEMI (non-ST elevated myocardial infarction) 02/21/2024    Numbness 01/06/2023    SHERYL (obstructive sleep apnea)     Other cardiomyopathies     TIA (transient ischemic attack)     VT (ventricular tachycardia)        Past Surgical History:   Procedure Laterality Date    ECHOCARDIOGRAM,TRANSESOPHAGEAL N/A 3/20/2024    Procedure: Transesophageal echo (MINISTERIO) intra-procedure log documentation;  Surgeon: Provider, Dosc Diagnostic;  Location: [facility] EP LAB;  Service: Cardiology;  Laterality: N/A;    JOINT REPLACEMENT      TONSILLECTOMY         Review of patient's allergies indicates:   Allergen Reactions    Ace inhibitors      cough    Adhesive      Skin rash       No current facility-administered medications on file prior to encounter.     Current Outpatient Medications on File Prior to Encounter   Medication Sig    amiodarone (PACERONE) 200 MG Tab TAKE 2 TABLETS BY MOUTH ONCE DAILY (Patient taking differently: Take 400 mg by mouth once daily.)    apixaban (ELIQUIS) 5 mg Tab Take 1 tablet (5 mg total) by mouth 2 (two) times daily.    aspirin (ECOTRIN) 81 MG EC tablet Take 1 tablet (81 mg total) by mouth once. 1 Tablet, Delayed Release (E.C.) Oral Every day for 1 dose    atorvastatin (LIPITOR) 40 MG tablet Take 1 tablet (40 mg total) by mouth once daily.    carvediloL (COREG) 25 MG tablet Take 1 tablet (25 mg total) by mouth 2 (two) times a day.    digoxin (LANOXIN) 250 mcg tablet  Take 1 tablet (250 mcg total) by mouth once daily.    empagliflozin (JARDIANCE) 10 mg tablet Take 1 tablet (10 mg total) by mouth once daily.    furosemide (LASIX) 40 MG tablet Take 1 tablet (40 mg total) by mouth once daily. 1 Tablet Oral Every day (Patient taking differently: Take 40 mg by mouth once daily. 1 Tablet Oral Every day, has been taking bid for last few weeks per MD orders.)    gabapentin (NEURONTIN) 300 MG capsule Take 1 capsule (300 mg total) by mouth 3 (three) times daily. (Patient taking differently: Take 300 mg by mouth 2 (two) times daily.)    losartan (COZAAR) 50 MG tablet Take 1 tablet (50 mg total) by mouth once daily.    meclizine (ANTIVERT) 25 mg tablet Take 1 tablet (25 mg total) by mouth 3 (three) times daily as needed for Dizziness.    tamsulosin (FLOMAX) 0.4 mg Cap Take 0.4 mg by mouth once daily.    ALPRAZolam (XANAX) 0.25 MG tablet Take 1 tablet (0.25 mg total) by mouth 2 (two) times daily as needed for Anxiety.    tadalafiL (CIALIS) 5 MG tablet Take 1 tablet (5 mg total) by mouth daily as needed for Erectile Dysfunction.    [DISCONTINUED] albuterol (PROVENTIL/VENTOLIN HFA) 90 mcg/actuation inhaler Inhale 1-2 puffs into the lungs every 6 (six) hours as needed for Shortness of Breath or Wheezing.    [DISCONTINUED] allopurinoL (ZYLOPRIM) 100 MG tablet Take 1 tablet by mouth once daily.    [DISCONTINUED] traMADoL (ULTRAM) 50 mg tablet Take 50 mg by mouth every 8 (eight) hours as needed for Pain.     Family History    None       Tobacco Use    Smoking status: Former     Types: Cigars    Smokeless tobacco: Never   Substance and Sexual Activity    Alcohol use: Yes     Comment: occasionally    Drug use: Never    Sexual activity: Not Currently     Review of Systems   Constitutional:  Positive for activity change. Negative for chills and fever.   Respiratory:  Positive for chest tightness and shortness of breath.    Cardiovascular:  Negative for palpitations and leg swelling.   Gastrointestinal:  "Negative.    Genitourinary: Negative.    Neurological: Negative.      Objective:     Vital Signs (Most Recent):  Temp: 97.9 °F (36.6 °C) (03/28/24 0115)  Pulse: 68 (03/28/24 0152)  Resp: 18 (03/28/24 0115)  BP: 137/73 (03/28/24 0115)  SpO2: 96 % (03/28/24 0115) Vital Signs (24h Range):  Temp:  [97.3 °F (36.3 °C)-98.6 °F (37 °C)] 97.9 °F (36.6 °C)  Pulse:  [57-73] 68  Resp:  [15-20] 18  SpO2:  [95 %-100 %] 96 %  BP: (116-143)/(59-73) 137/73     Weight: 119 kg (262 lb 5.6 oz)  Body mass index is 37.64 kg/m².     Physical Exam  Vitals and nursing note reviewed.   Constitutional:       General: He is not in acute distress.  HENT:      Head: Normocephalic and atraumatic.   Eyes:      General: No scleral icterus.  Cardiovascular:      Rate and Rhythm: Normal rate and regular rhythm.      Heart sounds: Murmur heard.   Pulmonary:      Effort: Pulmonary effort is normal. No respiratory distress.      Breath sounds: Normal breath sounds. No wheezing or rales.   Abdominal:      General: Bowel sounds are normal. There is no distension.      Palpations: Abdomen is soft.      Tenderness: There is no abdominal tenderness. There is no guarding.   Musculoskeletal:      Cervical back: Neck supple.      Right lower leg: No edema.      Left lower leg: No edema.   Skin:     General: Skin is warm and dry.   Neurological:      General: No focal deficit present.      Mental Status: He is alert and oriented to person, place, and time.                Significant Labs: All pertinent labs within the past 24 hours have been reviewed.  CMP:   Recent Labs   Lab 03/27/24 2058      K 3.6      CO2 27      BUN 19   CREATININE 1.1   CALCIUM 8.7   PROT 6.9   ALBUMIN 2.7*   BILITOT 0.5   ALKPHOS 88   AST 23   ALT 24   ANIONGAP 10     Cardiac Markers:   Recent Labs   Lab 03/27/24 2058   *     Coagulation: No results for input(s): "PT", "INR", "APTT" in the last 48 hours.  Lactic Acid: No results for input(s): "LACTATE" in the " "last 48 hours.  Troponin:   Recent Labs   Lab 03/27/24 2058 03/27/24 2243   TROPONINI 0.051* 0.037*     Urine Studies: No results for input(s): "COLORU", "APPEARANCEUA", "PHUR", "SPECGRAV", "PROTEINUA", "GLUCUA", "KETONESU", "BILIRUBINUA", "OCCULTUA", "NITRITE", "UROBILINOGEN", "LEUKOCYTESUR", "RBCUA", "WBCUA", "BACTERIA", "SQUAMEPITHEL", "HYALINECASTS" in the last 48 hours.    Invalid input(s): "WRIGHTSUR"    Significant Imaging: I have reviewed all pertinent imaging results/findings within the past 24 hours.  XR CHEST AP PORTABLE     CLINICAL HISTORY:  Provided history is "Chest Pain;  ".     TECHNIQUE:  One view of the chest.     COMPARISON:  12/20/2023.     FINDINGS:  Cardiac wires overlie the chest.  Cardiomediastinal silhouette is enlarged and similar to the prior study.  Lung volumes are relatively low, accentuating basilar markings.  Left chest wall AICD is again present.  Central vascular congestion with minimal perihilar interstitial densities.  No confluent area of consolidation.  No large pleural effusion.  No pneumothorax.     Impression:     Cardiomegaly and mild interstitial edema.  No large focal consolidation identified on this single view.        Electronically signed by: Giacomo Aguiar MD  Date:                                            03/27/2024  Time:                                           20:38  "

## 2024-03-28 NOTE — ASSESSMENT & PLAN NOTE
Patient is identified as having Combined Systolic and Diastolic heart failure that is Acute on chronic. CHF is currently uncontrolled due to Pulmonary edema/pleural effusion on CXR. Continue Beta Blocker, ACE/ARB, and Furosemide and monitor clinical status closely. Monitor on telemetry. Patient is off CHF pathway.  Monitor strict Is&Os and daily weights.  Place on fluid restriction of 1.5 L.     Recent Labs   Lab 03/27/24 2058   *       -Admitted with clinical volume overload, conistent with CHF exacerbation  -numerous admissions likely related to severe MR, most recently demonstrated on flaquita from 03/20/24  -CXR w interstitial edema, BNP per above  -Home diuretic: lasix 40mg daily  Plan:  -HTS team consulted to assist with GDMT management  -Increase IV diuresis to 80mg BID given less than expected UOP  -Daily weights, strict I&O, fluid restriction  -MR management per below

## 2024-03-28 NOTE — CONSULTS
[facility] - Cardiology Stepdown  Heart Transplant  Consult Note    Patient Name: Franco Cooper  MRN: [id number]  Admission Date: 3/27/2024  Hospital Length of Stay: 1 days  Attending Physician: Genaro Peng DO  Primary Care Provider: Tomas Hidalgo MD   Principal Problem:Acute on chronic combined systolic and diastolic congestive heart failure    Inpatient consult to Heart Transplant  Consult performed by: Marvin Ayon MD  Consult ordered by: Leonard Beard MD  Reason for consult: GDMT        Subjective:     History of Present Illness:  59 M with hx of NICM (EF 12-20% / LVIDd 6.93 2022), VT s/p ICD, afib (on apixiban), HTN, SHERYL on CPAP who presented to [facility] ER after leaving a doctors appointment for acute chest pain and shortness of breath. Of note he was recenlty admitted at [facility] for CHF and pneumonia. There his Bcx were positive for coag neg staph and an outpt MINISTERIO was ordered. He underwent that MINISTERIO at [facility] and found that he had severe MR (posterior leaf tethered with 2 jets at A3/P3 and A2/P2). While in the ER he was given IV lasix 40mg with over 1L UOP. He was admitted to medicine and IC was consulted for further options. Of note he has had at minimum moderate MR since 2021 and in January 2023 TTE was done with moderate to severe MR. He current GDMT regimen at home is coreg 25mg Bid, losartan 50mg daily and empagliflozin 10mg daily. He is also taking lasix 40mg BID, which he states is not working as much as it use to. He use to be on aldactone int he past but seems to be discontinued since 2022.  He is current being diuresised with IV lasix 80mg BID. HTS was consulted for GDMT recs prior to ravinder-clip being done by IC.    Past Medical History:   Diagnosis Date    A-fib     AICD (automatic cardioverter/defibrillator) present     AICD at end of battery life 12/04/2019    Formatting of this note might be different from the original.   Added automatically from request for surgery [id number]    CHF (congestive heart  failure)     Community acquired pneumonia of right lung 02/21/2024    DDD (degenerative disc disease), lumbar     HTN (hypertension)     Hydronephrosis of right kidney 07/19/2021    Hyperuricemia 08/02/2021    Lumbar radiculopathy     Mixed hyperlipidemia     NSTEMI (non-ST elevated myocardial infarction) 02/21/2024    Numbness 01/06/2023    SHERYL (obstructive sleep apnea)     Other cardiomyopathies     TIA (transient ischemic attack)     VT (ventricular tachycardia)        Past Surgical History:   Procedure Laterality Date    ECHOCARDIOGRAM,TRANSESOPHAGEAL N/A 3/20/2024    Procedure: Transesophageal echo (MINISTERIO) intra-procedure log documentation;  Surgeon: Provider, Dosc Diagnostic;  Location: [facility] EP LAB;  Service: Cardiology;  Laterality: N/A;    JOINT REPLACEMENT      TONSILLECTOMY         Review of patient's allergies indicates:   Allergen Reactions    Ace inhibitors      cough    Adhesive      Skin rash       Current Facility-Administered Medications   Medication    acetaminophen tablet 650 mg    aluminum-magnesium hydroxide-simethicone 200-200-20 mg/5 mL suspension 30 mL    amiodarone tablet 400 mg    apixaban tablet 5 mg    atorvastatin tablet 40 mg    carvediloL tablet 25 mg    digoxin tablet 250 mcg    empagliflozin (Jardiance) tablet 10 mg    furosemide injection 80 mg    gabapentin capsule 300 mg    losartan tablet 50 mg    magnesium sulfate 2g in water 50mL IVPB (premix)    meclizine tablet 25 mg    melatonin tablet 6 mg    naloxone 0.4 mg/mL injection 0.02 mg    nitroGLYCERIN SL tablet 0.4 mg    polyethylene glycol packet 17 g    prochlorperazine injection Soln 5 mg    sodium chloride 0.9% flush 10 mL    tamsulosin 24 hr capsule 0.4 mg     Family History    None       Tobacco Use    Smoking status: Former     Types: Cigars    Smokeless tobacco: Never   Substance and Sexual Activity    Alcohol use: Yes     Comment: occasionally    Drug use: Never    Sexual activity: Not Currently     Review of Systems    Constitutional: Negative.    HENT: Negative.     Eyes: Negative.    Respiratory:  Positive for cough, chest tightness, shortness of breath and wheezing.    Cardiovascular:  Positive for chest pain. Negative for palpitations and leg swelling.   Gastrointestinal: Negative.    Endocrine: Negative.    Genitourinary: Negative.    Musculoskeletal: Negative.    Allergic/Immunologic: Negative.    Neurological: Negative.    Hematological: Negative.    Psychiatric/Behavioral: Negative.       Objective:     Vital Signs (Most Recent):  Temp: 97.6 °F (36.4 °C) (03/28/24 0401)  Pulse: 77 (03/28/24 1117)  Resp: 18 (03/28/24 1015)  BP: (!) 103/59 (03/28/24 1015)  SpO2: 96 % (03/28/24 0817) Vital Signs (24h Range):  Temp:  [97.3 °F (36.3 °C)-98.6 °F (37 °C)] 97.6 °F (36.4 °C)  Pulse:  [57-77] 77  Resp:  [15-20] 18  SpO2:  [95 %-100 %] 96 %  BP: (101-143)/(51-73) 103/59     Patient Vitals for the past 72 hrs (Last 3 readings):   Weight   03/28/24 0114 119 kg (262 lb 5.6 oz)   03/27/24 1937 122.5 kg (270 lb)     Body mass index is 37.64 kg/m².      Intake/Output Summary (Last 24 hours) at 3/28/2024 1256  Last data filed at 3/28/2024 1021  Gross per 24 hour   Intake 360 ml   Output 225 ml   Net 135 ml          Physical Exam  Vitals and nursing note reviewed.   Constitutional:       General: He is not in acute distress.     Appearance: Normal appearance. He is obese. He is not ill-appearing.   HENT:      Head: Normocephalic.      Nose: Nose normal.   Eyes:      Extraocular Movements: Extraocular movements intact.      Conjunctiva/sclera: Conjunctivae normal.      Pupils: Pupils are equal, round, and reactive to light.   Cardiovascular:      Rate and Rhythm: Normal rate and regular rhythm.      Heart sounds: No murmur heard.     No friction rub. No gallop.   Pulmonary:      Effort: No respiratory distress.      Breath sounds: Normal breath sounds. No wheezing.   Abdominal:      General: Bowel sounds are normal. There is no distension.  "     Palpations: Abdomen is soft.      Tenderness: There is no abdominal tenderness.   Musculoskeletal:         General: Normal range of motion.      Cervical back: Normal range of motion.      Right lower leg: No edema.      Left lower leg: No edema.   Skin:     General: Skin is warm.      Capillary Refill: Capillary refill takes 2 to 3 seconds.   Neurological:      General: No focal deficit present.      Mental Status: He is alert and oriented to person, place, and time.            Significant Labs:  CBC:  Recent Labs   Lab 03/27/24 2058   WBC 6.46   RBC 4.76   HGB 12.5*   HCT 39.4*      MCV 83   MCH 26.3*   MCHC 31.7*     BNP:  Recent Labs   Lab 03/27/24 2058   *     CMP:  Recent Labs   Lab 03/27/24 2058 03/28/24 0424    101   CALCIUM 8.7 8.7   ALBUMIN 2.7* 2.5*   PROT 6.9 6.4    141   K 3.6 4.1   CO2 27 26    108   BUN 19 20   CREATININE 1.1 1.1   ALKPHOS 88 79   ALT 24 26   AST 23 27   BILITOT 0.5 0.7      Coagulation:   Recent Labs   Lab 03/28/24 0424   INR 1.1   APTT 26.5     LDH:  No results for input(s): "LDH" in the last 72 hours.  Microbiology:  Microbiology Results (last 7 days)       ** No results found for the last 168 hours. **            I have reviewed all pertinent labs within the past 24 hours.    Diagnostic Results:  I have reviewed all pertinent imaging results/findings within the past 24 hours.    Assessment/Plan:     * Acute on chronic combined systolic and diastolic congestive heart failure  59 M with NICM EF 25-20%, VT, HTN, SHERYL and now with severe MR. Has had moderate to severe MR since 1/2023. Seen decrease in exercise tolerance since August 2023, but now is only able to walk 100ft before becoming SOB. GDMT has not been fully optimized for some time now. Admitted for CHF exacerbation and diuresising 4L total per the patient on IV lasix 80mg BID. IC has been consulted for  possible MV clip but would like HTS to assist in optimial GDMT.     NYHA III / " ACC stage C   -Continue with coreg 25mg BID and empagliflozin 10mg daily   -Recommend stopping losartan and starting entresto 24/26 BID   -Would start aldactone 12.5mg daily  -Diruresis with IV lasix 80mg BID and transition to PO once volume optimized per general cardiology team  -Review of Ecg shows the patient has had a LBBB with wide QRS since 2023 most recent was 186; would benefit from device upgrade to CTR-D after optimization.   -Please placed out patient referral to EP  -Please place an out patient referral for HTS and we will follow with the patient in the clinic   -He will need an outpt sarcoid PET scan as well        Thank you for your consult. I will sign off. Please contact us if you have any additional questions.    Marvin Ayon MD  Heart Transplant  [facility] - Cardiology Stepdown

## 2024-03-28 NOTE — ASSESSMENT & PLAN NOTE
Chronic, controlled. Latest blood pressure and vitals reviewed-     Temp:  [97.3 °F (36.3 °C)-98.6 °F (37 °C)]   Pulse:  [57-73]   Resp:  [15-20]   BP: (116-143)/(59-73)   SpO2:  [95 %-100 %] .   Home meds for hypertension were reviewed and noted below.   Hypertension Medications               carvediloL (COREG) 25 MG tablet Take 1 tablet (25 mg total) by mouth 2 (two) times a day.    furosemide (LASIX) 40 MG tablet Take 1 tablet (40 mg total) by mouth once daily. 1 Tablet Oral Every day    losartan (COZAAR) 50 MG tablet Take 1 tablet (50 mg total) by mouth once daily.            While in the hospital, will manage blood pressure as follows; Continue home antihypertensive regimen

## 2024-03-28 NOTE — PLAN OF CARE
[facility] - Emergency Dept  Initial Discharge Assessment       Primary Care Provider: Tomas Hidalgo MD    Admission Diagnosis: Chest pain [R07.9]    Admission Date: 3/27/2024  Expected Discharge Date: 03/29/2024  Sw met pt at bedside. Pt lives with his spouse and daughter. Pt ambulates with a cane and pt has a cpap. Pt stated his spouse drives him to his appointments but pt independently completes his adls.  Pt states at this time pt has no case management needs.    Marybel Jung LMSW  Case Management  Emergency Department  [phone]     Transition of Care Barriers: (P) None    Payor: MEDICARE / Plan: MEDICARE PART A & B / Product Type: Government /     Extended Emergency Contact Information  Primary Emergency Contact: Alcira Cooper  Address: [address] [place] [address]            [place] [address] [facility]  Home Phone: [phone]  Work Phone: [phone]  Mobile Phone: [phone]  Relation: Spouse    Discharge Plan A: (P) Home         [id number] - [place], [place] - [address]  [address] [facility] [address]  Phone: [phone] Fax: [phone]    [id number] - [place], [place] - [address]  [address]  [facility] [address]  Phone: [phone] Fax: [phone]      Initial Assessment (most recent)       Adult Discharge Assessment - 03/27/24 2351          Discharge Assessment    Assessment Type Discharge Planning Assessment (P)      Confirmed/corrected address, phone number and insurance Yes (P)      Source of Information patient (P)      Does patient/caregiver understand observation status Yes (P)      Communicated NARINDER with patient/caregiver Date not available/Unable to determine (P)      People in Home spouse;child(rochelle), dependent (P)      Do you expect to return to your current living situation? Yes (P)      Do you have help at home or someone to help you manage your care at home? Yes (P)      Who are your caregiver(s) and their  phone number(s)? Alcira Cooper  [phone] (P)      Prior to hospitilization cognitive status: Alert/Oriented (P)      Current cognitive status: Alert/Oriented (P)      Walking or Climbing Stairs Difficulty yes (P)      Walking or Climbing Stairs ambulation difficulty, requires equipment (P)      Home Accessibility wheelchair accessible (P)      Home Layout Able to live on 1st floor (P)      Equipment Currently Used at Home none (P)      Readmission within 30 days? No (P)      Patient currently being followed by outpatient case management? No (P)      Do you currently have service(s) that help you manage your care at home? No (P)      Do you take prescription medications? Yes (P)      Do you have prescription coverage? Yes (P)      Do you have any problems affording any of your prescribed medications? No (P)      Is the patient taking medications as prescribed? yes (P)      Who is going to help you get home at discharge? pt's spouse  Alcira Cooper (P)      How do you get to doctors appointments? family or friend will provide (P)      Are you on dialysis? No (P)      Do you take coumadin? No (P)    pt stated he takes Eliquis    Discharge Plan A Home (P)      DME Needed Upon Discharge  cane, straight;CPAP (P)      Discharge Plan discussed with: Patient (P)      Transition of Care Barriers None (P)         Physical Activity    On average, how many days per week do you engage in moderate to strenuous exercise (like a brisk walk)? 0 days (P)      On average, how many minutes do you engage in exercise at this level? 0 min (P)         Financial Resource Strain    How hard is it for you to pay for the very basics like food, housing, medical care, and heating? Not hard at all (P)         Housing Stability    In the last 12 months, was there a time when you were not able to pay the mortgage or rent on time? No (P)      In the last 12 months, how many places have you lived? 1 (P)      In the last 12 months, was there  a time when you did not have a steady place to sleep or slept in a shelter (including now)? No (P)         Transportation Needs    In the past 12 months, has lack of transportation kept you from medical appointments or from getting medications? No (P)      In the past 12 months, has lack of transportation kept you from meetings, work, or from getting things needed for daily living? No (P)         Food Insecurity    Within the past 12 months, you worried that your food would run out before you got the money to buy more. Never true (P)      Within the past 12 months, the food you bought just didn't last and you didn't have money to get more. Never true (P)         Stress    Do you feel stress - tense, restless, nervous, or anxious, or unable to sleep at night because your mind is troubled all the time - these days? Not at all (P)         Social Connections    In a typical week, how many times do you talk on the phone with family, friends, or neighbors? More than three times a week (P)      How often do you get together with friends or relatives? More than three times a week (P)      How often do you attend Buddhism or Jewish services? Never (P)      Do you belong to any clubs or organizations such as Buddhism groups, unions, fraternal or athletic groups, or school groups? No (P)      How often do you attend meetings of the clubs or organizations you belong to? Never (P)      Are you , , , , never , or living with a partner?  (P)         Alcohol Use    Q1: How often do you have a drink containing alcohol? Monthly or less (P)      Q2: How many drinks containing alcohol do you have on a typical day when you are drinking? -- (P)    pt stated he has 1 drink monthly    Q3: How often do you have six or more drinks on one occasion? Never (P)                               [facility] - Emergency Dept  NICU Initial Discharge Assessment       Primary Care Provider: Tomas Hidalgo,  MD    Expected Discharge Date:                [facility] - Emergency Dept  NICU Initial Discharge Assessment       Primary Care Provider: Tomas Hidalgo MD    Expected Discharge Date:

## 2024-03-28 NOTE — NURSING
Nurses Note -- 4 Eyes      3/28/2024   7:03 AM      Skin assessed during: Admit      [x] No Altered Skin Integrity Present    [x]Prevention Measures Documented      [] Yes- Altered Skin Integrity Present or Discovered   [] LDA Added if Not in Epic (Describe Wound)   [] New Altered Skin Integrity was Present on Admit and Documented in LDA   [] Wound Image Taken    Wound Care Consulted? No    Attending Nurse:   JU Boone and JU Cabrera    Second RN/Staff Member:  JU Boone and DeborahRN

## 2024-03-28 NOTE — SUBJECTIVE & OBJECTIVE
Past Medical History:   Diagnosis Date    A-fib     AICD (automatic cardioverter/defibrillator) present     AICD at end of battery life 12/04/2019    Formatting of this note might be different from the original.   Added automatically from request for surgery [id number]    CHF (congestive heart failure)     Community acquired pneumonia of right lung 02/21/2024    DDD (degenerative disc disease), lumbar     HTN (hypertension)     Hydronephrosis of right kidney 07/19/2021    Hyperuricemia 08/02/2021    Lumbar radiculopathy     Mixed hyperlipidemia     NSTEMI (non-ST elevated myocardial infarction) 02/21/2024    Numbness 01/06/2023    SHERYL (obstructive sleep apnea)     Other cardiomyopathies     TIA (transient ischemic attack)     VT (ventricular tachycardia)        Past Surgical History:   Procedure Laterality Date    ECHOCARDIOGRAM,TRANSESOPHAGEAL N/A 3/20/2024    Procedure: Transesophageal echo (MINISTERIO) intra-procedure log documentation;  Surgeon: Provider, [facility];  Location: [facility] EP LAB;  Service: Cardiology;  Laterality: N/A;    JOINT REPLACEMENT      TONSILLECTOMY         Review of patient's allergies indicates:   Allergen Reactions    Ace inhibitors      cough    Adhesive      Skin rash       PTA Medications   Medication Sig    amiodarone (PACERONE) 200 MG Tab TAKE 2 TABLETS BY MOUTH ONCE DAILY (Patient taking differently: Take 400 mg by mouth once daily.)    apixaban (ELIQUIS) 5 mg Tab Take 1 tablet (5 mg total) by mouth 2 (two) times daily.    aspirin (ECOTRIN) 81 MG EC tablet Take 1 tablet (81 mg total) by mouth once. 1 Tablet, Delayed Release (E.C.) Oral Every day for 1 dose    atorvastatin (LIPITOR) 40 MG tablet Take 1 tablet (40 mg total) by mouth once daily.    carvediloL (COREG) 25 MG tablet Take 1 tablet (25 mg total) by mouth 2 (two) times a day.    digoxin (LANOXIN) 250 mcg tablet Take 1 tablet (250 mcg total) by mouth once daily.    empagliflozin (JARDIANCE) 10 mg tablet Take 1 tablet (10 mg total)  by mouth once daily.    furosemide (LASIX) 40 MG tablet Take 1 tablet (40 mg total) by mouth once daily. 1 Tablet Oral Every day (Patient taking differently: Take 40 mg by mouth once daily. 1 Tablet Oral Every day, has been taking bid for last few weeks per MD orders.)    gabapentin (NEURONTIN) 300 MG capsule Take 1 capsule (300 mg total) by mouth 3 (three) times daily. (Patient taking differently: Take 300 mg by mouth 2 (two) times daily.)    losartan (COZAAR) 50 MG tablet Take 1 tablet (50 mg total) by mouth once daily.    meclizine (ANTIVERT) 25 mg tablet Take 1 tablet (25 mg total) by mouth 3 (three) times daily as needed for Dizziness.    tamsulosin (FLOMAX) 0.4 mg Cap Take 0.4 mg by mouth once daily.    ALPRAZolam (XANAX) 0.25 MG tablet Take 1 tablet (0.25 mg total) by mouth 2 (two) times daily as needed for Anxiety.    tadalafiL (CIALIS) 5 MG tablet Take 1 tablet (5 mg total) by mouth daily as needed for Erectile Dysfunction.     Family History    None       Tobacco Use    Smoking status: Former     Types: Cigars    Smokeless tobacco: Never   Substance and Sexual Activity    Alcohol use: Yes     Comment: occasionally    Drug use: Never    Sexual activity: Not Currently     Review of Systems   Constitutional: Positive for malaise/fatigue.   Cardiovascular:  Positive for dyspnea on exertion. Negative for chest pain.     Objective:     Vital Signs (Most Recent):  Temp: 98 °F (36.7 °C) (03/28/24 1614)  Pulse: 65 (03/28/24 1614)  Resp: 18 (03/28/24 1614)  BP: (!) 111/59 (03/28/24 1614)  SpO2: 96 % (03/28/24 1614) Vital Signs (24h Range):  Temp:  [97.3 °F (36.3 °C)-98.6 °F (37 °C)] 98 °F (36.7 °C)  Pulse:  [57-77] 65  Resp:  [15-20] 18  SpO2:  [95 %-100 %] 96 %  BP: (101-143)/(51-73) 111/59     Weight: 119 kg (262 lb 5.6 oz)  Body mass index is 37.64 kg/m².    SpO2: 96 %         Intake/Output Summary (Last 24 hours) at 3/28/2024 1633  Last data filed at 3/28/2024 1021  Gross per 24 hour   Intake 360 ml   Output 225  ml   Net 135 ml       Lines/Drains/Airways       Peripheral Intravenous Line  Duration                  Peripheral IV - Single Lumen 03/27/24 2100 20 G Right Antecubital <1 day                     Physical Exam  Constitutional:       General: He is not in acute distress.     Appearance: He is obese. He is not ill-appearing.   HENT:      Head: Normocephalic and atraumatic.      Nose: No congestion.      Mouth/Throat:      Mouth: Mucous membranes are moist.   Eyes:      Conjunctiva/sclera: Conjunctivae normal.   Cardiovascular:      Rate and Rhythm: Normal rate and regular rhythm.      Heart sounds: Murmur heard.   Pulmonary:      Effort: Pulmonary effort is normal. No respiratory distress.   Abdominal:      General: Abdomen is flat. There is no distension.      Palpations: Abdomen is soft.   Musculoskeletal:      Cervical back: Normal range of motion.      Right lower leg: No edema.      Left lower leg: No edema.   Skin:     Capillary Refill: Capillary refill takes less than 2 seconds.      Findings: No rash.   Neurological:      Mental Status: He is alert and oriented to person, place, and time.   Psychiatric:         Mood and Affect: Mood normal.            Significant Labs: All pertinent lab results from the last 24 hours have been reviewed.

## 2024-03-28 NOTE — ED TRIAGE NOTES
Franco Cooper, a 59 y.o. male presents to the ED w/ complaint of midsternal chest pain that radiates to the left sided chest wall that started around 12PM today. Pt also complains of SOB for the past day.    Triage note:  Chief Complaint   Patient presents with    Chest Pain     Midsternal CP radiating to left chest and SOB x 1 day. Hx defibrillator      Review of patient's allergies indicates:   Allergen Reactions    Ace inhibitors      cough    Adhesive      Skin rash     Past Medical History:   Diagnosis Date    A-fib     AICD (automatic cardioverter/defibrillator) present     CHF (congestive heart failure)     DDD (degenerative disc disease), lumbar     HTN (hypertension)     Lumbar radiculopathy     Mixed hyperlipidemia     SHERYL (obstructive sleep apnea)     Other cardiomyopathies     TIA (transient ischemic attack)     VT (ventricular tachycardia)

## 2024-03-28 NOTE — ASSESSMENT & PLAN NOTE
Body mass index is 37.64 kg/m². Morbid obesity complicates all aspects of disease management from diagnostic modalities to treatment.

## 2024-03-28 NOTE — HPI
59 year old male with past medical history of NICM, recently diagnosed functional severe MR, HTN, AF, SHERYL who presents for acute onset SOB. Reports he was leaving a doctor appt, walking to his car, developed sudden FELDMAN followed by mild chest discomfort. In ED, HD stable. Lab work grossly unremarkable except . CXR with mild pulm edema. Good UOP with lasix 40mg IV x 1. Admitted to  for further care.     Of note, recent admit to [facility] 2/20-2/25 - found with coag neg staph bacteremia (2/21) surveillance culture negative 2/22, per ID recs treated with IV antibiotics  x 7 days and as outpatient had repeat blood culture on 3/4 (1 set peripheral right) negative. During that admit there was concern for worsening, now severe, MR on TTE. He was seen outpt by Dr. Rodríguez () who obtained a MINISTERIO which found severe MR with posterior leaflet restriction, primary jet appears to be A3P3, LVEDD of 8.5 cm. He has scheduled follow up with Dr. Marroquin in May and also HTS    IC consulted for known severe MR    Last LHC was at OSH in 2019 without significant disease.

## 2024-03-28 NOTE — ED NOTES
Telemetry Verification   Patient placed on Telemetry Box  Verified with War Room  Tech    Box # [id number]   Rate 77   Rhythm NSR

## 2024-03-28 NOTE — ASSESSMENT & PLAN NOTE
Patient with Paroxysmal (<7 days) atrial fibrillation which is controlled currently with Beta Blocker, Amiodarone, and Digoxin. Patient is currently in sinus rhythm.[id number] Score: The patient doesn't have any registry metric data available.  Anticoagulation indicated. Anticoagulation done with apixaban .

## 2024-03-28 NOTE — PROGRESS NOTES
[facility] - Cardiology [facility] Medicine  Progress Note    Patient Name: Franco Cooper  MRN: [id number]  Patient Class: IP- Inpatient   Admission Date: 3/27/2024  Length of Stay: 1 days  Attending Physician: Genaro Peng DO  Primary Care Provider: Tomas Hidalgo MD        Subjective:     Principal Problem:Acute on chronic combined systolic and diastolic congestive heart failure        HPI:  58 y/o AAM with NICM EF 15-20%, Severe MR, HTN, SHERYL on CPAP, pAfib, presents to the ED with complaints of chest pain.  He reports that earlier today he had a doctors appointment, when leaving the clinic and walking back to his car, he noted dyspnea on exertion and then developed mid to left sided chest tightness.  Symptoms became more severe throughout the day, rated chest pain 7/10 at its worst.  He did not take any medication at home for symptom relief.  Coughing made the chest tightness worse.       He was evaluated in the ED - CXR with signs of interstitial edema and BNP was elevated, he has no oxygen requirement.  He was given 40mg IV lasix in ED, reports having 1.5-2L of UOP, and during my interview reports that chest pain has resolved.   He does note that his home oral lasix dose 40mg PO BID, has not been producing similar diuretic response as it previously would.     He was admitted to [facility] 2/20-2/25 - found with coag neg staph bacteremia (2/21) surveillance culture negative 2/22, per ID recs treated with IV antibiotics  x 7 days and as outpatient had repeat blood culture on 3/4 (1 set peripheral right) negative.  He had MINISTERIO performed [place] [facility] [place] after this admission showed severe MR.  As outpatient has has pending ambulatory referral to HTS & Interventional cardiology.     Overview/Hospital Course:  Admitted with chest pain and dyspnea on exertion.  CXR showing signs of volume overload, improving with IV diuresis.  Numerous admissions likely related to severe MR, most recently demonstrated on MINISTERIO from  03/20.  Interventional Cardiology consulted, recommending HTS team for GDM T optimization before proceeding with angiogram for further workup in preparation for surgical clip    Interval History: Seen this AM at bedside. Shortness of breath improved along with chest tightness. Noticed that he is not urinating as much as expected in relation to previous hospitalizations. Denies fevers, chills, cough, abdominal pain    Review of Systems  Objective:     Vital Signs (Most Recent):  Temp: 97.6 °F (36.4 °C) (03/28/24 0401)  Pulse: 77 (03/28/24 1117)  Resp: 18 (03/28/24 1015)  BP: (!) 103/59 (03/28/24 1015)  SpO2: 96 % (03/28/24 0817) Vital Signs (24h Range):  Temp:  [97.3 °F (36.3 °C)-98.6 °F (37 °C)] 97.6 °F (36.4 °C)  Pulse:  [57-77] 77  Resp:  [15-20] 18  SpO2:  [95 %-100 %] 96 %  BP: (101-143)/(51-73) 103/59     Weight: 119 kg (262 lb 5.6 oz)  Body mass index is 37.64 kg/m².    Intake/Output Summary (Last 24 hours) at 3/28/2024 1353  Last data filed at 3/28/2024 1021  Gross per 24 hour   Intake 360 ml   Output 225 ml   Net 135 ml         Physical Exam  Vitals and nursing note reviewed.   Constitutional:       General: He is not in acute distress.     Appearance: Normal appearance. He is not ill-appearing or toxic-appearing.   HENT:      Head: Normocephalic and atraumatic.   Eyes:      Pupils: Pupils are equal, round, and reactive to light.   Cardiovascular:      Rate and Rhythm: Normal rate and regular rhythm.      Heart sounds: Murmur heard.   Pulmonary:      Effort: Pulmonary effort is normal. No respiratory distress.      Breath sounds: Normal breath sounds. No wheezing or rales.   Abdominal:      General: Bowel sounds are normal. There is no distension.      Palpations: Abdomen is soft.      Tenderness: There is no abdominal tenderness. There is no guarding.   Musculoskeletal:      Cervical back: Normal range of motion.      Right lower leg: No edema.      Left lower leg: No edema.   Skin:     General: Skin is  warm and dry.   Neurological:      General: No focal deficit present.      Mental Status: He is alert and oriented to person, place, and time.   Psychiatric:         Mood and Affect: Mood normal.         Behavior: Behavior normal.             Significant Labs: All pertinent labs within the past 24 hours have been reviewed.    Significant Imaging: I have reviewed all pertinent imaging results/findings within the past 24 hours.    Assessment/Plan:      * Acute on chronic combined systolic and diastolic congestive heart failure  Patient is identified as having Combined Systolic and Diastolic heart failure that is Acute on chronic. CHF is currently uncontrolled due to Pulmonary edema/pleural effusion on CXR. Continue Beta Blocker, ACE/ARB, and Furosemide and monitor clinical status closely. Monitor on telemetry. Patient is off CHF pathway.  Monitor strict Is&Os and daily weights.  Place on fluid restriction of 1.5 L.     Recent Labs   Lab 03/27/24 2058   *       -Admitted with clinical volume overload, conistent with CHF exacerbation  -numerous admissions likely related to severe MR, most recently demonstrated on flaquita from 03/20/24  -CXR w interstitial edema, BNP per above  -Home diuretic: lasix 40mg daily  Plan:  -HTS team consulted to assist with GDMT management  -Increase IV diuresis to 80mg BID given less than expected UOP  -Daily weights, strict I&O, fluid restriction  -MR management per below    Nonrheumatic mitral valve regurgitation  -Possibly contributing to volume overload, recurrent admissions  -FLAQUITA 3/20/24:  Moderately dilated LA.  Severe regurgitation of the mitral valve/no stenosis  -discussed case with interventional Cardiology, recommending HTS team for GDM T optimization before proceeding with angiogram (likely Monday) for further workup in preparation for surgical clip            Other chest pain  -pain resolved after IV lasix given  -trending troponin     History of ventricular  tachycardia  -s/p AICD placement  -continue home amiodarone 400mg po daily       Paroxysmal atrial fibrillation  Patient with Paroxysmal (<7 days) atrial fibrillation which is controlled currently with Beta Blocker, Amiodarone, and Digoxin. Patient is currently in sinus rhythm.[id number] Score: The patient doesn't have any registry metric data available.  Anticoagulation indicated. Anticoagulation done with apixaban .    BMI 39.0-39.9,adult  Body mass index is 37.64 kg/m². Morbid obesity complicates all aspects of disease management from diagnostic modalities to treatment.       Nonischemic cardiomyopathy  Continue his goal directed therapy  -patient has not been on entresto before.       Essential hypertension  Chronic, controlled. Latest blood pressure and vitals reviewed-     Temp:  [97.3 °F (36.3 °C)-98.6 °F (37 °C)]   Pulse:  [57-73]   Resp:  [15-20]   BP: (116-143)/(59-73)   SpO2:  [95 %-100 %] .   Home meds for hypertension were reviewed and noted below.   Hypertension Medications               carvediloL (COREG) 25 MG tablet Take 1 tablet (25 mg total) by mouth 2 (two) times a day.    furosemide (LASIX) 40 MG tablet Take 1 tablet (40 mg total) by mouth once daily. 1 Tablet Oral Every day    losartan (COZAAR) 50 MG tablet Take 1 tablet (50 mg total) by mouth once daily.            While in the hospital, will manage blood pressure as follows; Continue home antihypertensive regimen    Automatic implantable cardioverter-defibrillator in situ  Check cardiac device check to eval if any occult arrhythmia to cause his symptoms.         VTE Risk Mitigation (From admission, onward)           Ordered     apixaban tablet 5 mg  2 times daily         03/28/24 0044     IP VTE HIGH RISK PATIENT  Once         03/28/24 0044     Place sequential compression device  Until discontinued         03/28/24 0044     Reason for No Pharmacological VTE Prophylaxis  Once        Question:  Reasons:  Answer:  Already adequately  anticoagulated on oral Anticoagulants    03/28/24 0044                    Discharge Planning   NARINDER: 4/1/2024     Code Status: Full Code   Is the patient medically ready for discharge?: No    Reason for patient still in hospital (select all that apply): Patient trending condition  Discharge Plan A: Home                  Genaro Peng DO  Department of Hospital Medicine   [facility] - Cardiology Stepdown

## 2024-03-28 NOTE — HPI
60 y/o AAM with NICM EF 15-20%, Severe MR, HTN, SHERYL on CPAP, pAfib, presents to the ED with complaints of chest pain.  He reports that earlier today he had a doctors appointment, when leaving the clinic and walking back to his car, he noted dyspnea on exertion and then developed mid to left sided chest tightness.  Symptoms became more severe throughout the day, rated chest pain 7/10 at its worst.  He did not take any medication at home for symptom relief.  Coughing made the chest tightness worse.       He was evaluated in the ED - CXR with signs of interstitial edema and BNP was elevated, he has no oxygen requirement.  He was given 40mg IV lasix in ED, reports having 1.5-2L of UOP, and during my interview reports that chest pain has resolved.   He does note that his home oral lasix dose 40mg PO BID, has not been producing similar diuretic response as it previously would.     He was admitted to [facility] 2/20-2/25 - found with coag neg staph bacteremia (2/21) surveillance culture negative 2/22, per ID recs treated with IV antibiotics  x 7 days and as outpatient had repeat blood culture on 3/4 (1 set peripheral right) negative.  He had MINISTERIO performed @ [facility] [place] after this admission showed severe MR.  As outpatient has has pending ambulatory referral to [facility] & Interventional cardiology.

## 2024-03-28 NOTE — HPI
59 M with hx of NICM (EF 12-20% / LVIDd 6.93 2022), VT s/p ICD, afib (on apixiban), HTN, SHERYL on CPAP who [place] [facility] after leaving a doctors appointment for acute chest pain and shortness of breath. Of note he was recenlty admitted at [facility] for CHF and pneumonia. There his Bcx were positive for coag neg staph and an outpt MINISTERIO was ordered. He underwent that MINISTERIO [place] [facility] [place] and found that he had severe MR (posterior leaf tethered with 2 jets at A3/P3 and A2/P2). While in the ER he was given IV lasix 40mg with over 1L UOP. He was admitted to medicine and IC was consulted for further options. Of note he has had at minimum moderate MR since 2021 and in January 2023 TTE was done with moderate to severe MR. He current GDMT regimen at home is coreg 25mg Bid, losartan 50mg daily and empagliflozin 10mg daily. He is also taking lasix 40mg BID, which he states is not working as much as it use to. He use to be on aldactone int he past but seems to be discontinued since 2022.  He is current being diuresised with IV lasix 80mg BID. HTS was consulted for GDMT recs prior to ravinder-clip being done by IC.

## 2024-03-28 NOTE — ASSESSMENT & PLAN NOTE
-Possibly contributing to volume overload, recurrent admissions  -MINISTERIO 3/20/24:  Moderately dilated LA.  Severe regurgitation of the mitral valve/no stenosis  -discussed case with interventional Cardiology, recommending HTS team for GDM T optimization before proceeding with angiogram (likely Monday) for further workup in preparation for surgical clip

## 2024-03-28 NOTE — PLAN OF CARE
AAOx4, VSS. Denies chest pain.   Plan of care and fall precautions reviewed with patient.   Continuous cardiac monitoring in place.      Problem: Adult Inpatient Plan of Care  Goal: Plan of Care Review  Outcome: Ongoing, Progressing  Goal: Patient-Specific Goal (Individualized)  Outcome: Ongoing, Progressing  Goal: Absence of Hospital-Acquired Illness or Injury  Outcome: Ongoing, Progressing  Goal: Optimal Comfort and Wellbeing  Outcome: Ongoing, Progressing  Goal: Readiness for Transition of Care  Outcome: Ongoing, Progressing     Problem: Infection  Goal: Absence of Infection Signs and Symptoms  Outcome: Ongoing, Progressing     Problem: Fall Injury Risk  Goal: Absence of Fall and Fall-Related Injury  Outcome: Ongoing, Progressing

## 2024-03-28 NOTE — CONSULTS
[facility] - Cardiology Stepdown  Interventional Cardiology  Consult Note    Patient Name: Franco Cooper  MRN: [id number]  Admission Date: 3/27/2024  Hospital Length of Stay: 1 days  Code Status: Full Code   Attending Provider: Genaro Peng DO  Consulting Provider: Leonard Beard MD  Primary Care Physician: Tomas Hidalgo MD  Principal Problem:Acute on chronic combined systolic and diastolic congestive heart failure    Patient information was obtained from patient, past medical records, and ER records.     Inpatient consult to Interventional Cardiology  Consult performed by: Leonard Beard MD  Consult ordered by: Chad Jessica MD        Subjective:     Chief Complaint:  FELDMAN     HPI:  59 year old male with past medical history of NICM, recently diagnosed functional severe MR, HTN, AF, SHERYL who presents for acute onset SOB. Reports he was leaving a doctor appt, walking to his car, developed sudden FELDMAN followed by mild chest discomfort. In ED, HD stable. Lab work grossly unremarkable except . CXR with mild pulm edema. Good UOP with lasix 40mg IV x 1. Admitted to  for further care.     Of note, recent admit to [facility] 2/20-2/25 - found with coag neg staph bacteremia (2/21) surveillance culture negative 2/22, per ID recs treated with IV antibiotics  x 7 days and as outpatient had repeat blood culture on 3/4 (1 set peripheral right) negative. During that admit there was concern for worsening, now severe, MR on TTE. He was seen outpt by Dr. Rodríguez () who obtained a MINISTERIO which found severe MR with posterior leaflet restriction, primary jet appears to be A3P3, LVEDD of 8.5 cm. He has scheduled follow up with Dr. Marroquin in May and also [facility]    IC consulted for known severe MR    Last LHC was at OSH in 2019 without significant disease.         Past Medical History:   Diagnosis Date    A-fib     AICD (automatic cardioverter/defibrillator) present     AICD at end of battery life 12/04/2019    Formatting of this  note might be different from the original.   Added automatically from request for surgery [id number]    CHF (congestive heart failure)     Community acquired pneumonia of right lung 02/21/2024    DDD (degenerative disc disease), lumbar     HTN (hypertension)     Hydronephrosis of right kidney 07/19/2021    Hyperuricemia 08/02/2021    Lumbar radiculopathy     Mixed hyperlipidemia     NSTEMI (non-ST elevated myocardial infarction) 02/21/2024    Numbness 01/06/2023    SHERYL (obstructive sleep apnea)     Other cardiomyopathies     TIA (transient ischemic attack)     VT (ventricular tachycardia)        Past Surgical History:   Procedure Laterality Date    ECHOCARDIOGRAM,TRANSESOPHAGEAL N/A 3/20/2024    Procedure: Transesophageal echo (MINISTERIO) intra-procedure log documentation;  Surgeon: Provider, Dosc Diagnostic;  Location: [facility] EP LAB;  Service: Cardiology;  Laterality: N/A;    JOINT REPLACEMENT      TONSILLECTOMY         Review of patient's allergies indicates:   Allergen Reactions    Ace inhibitors      cough    Adhesive      Skin rash       PTA Medications   Medication Sig    amiodarone (PACERONE) 200 MG Tab TAKE 2 TABLETS BY MOUTH ONCE DAILY (Patient taking differently: Take 400 mg by mouth once daily.)    apixaban (ELIQUIS) 5 mg Tab Take 1 tablet (5 mg total) by mouth 2 (two) times daily.    aspirin (ECOTRIN) 81 MG EC tablet Take 1 tablet (81 mg total) by mouth once. 1 Tablet, Delayed Release (E.C.) Oral Every day for 1 dose    atorvastatin (LIPITOR) 40 MG tablet Take 1 tablet (40 mg total) by mouth once daily.    carvediloL (COREG) 25 MG tablet Take 1 tablet (25 mg total) by mouth 2 (two) times a day.    digoxin (LANOXIN) 250 mcg tablet Take 1 tablet (250 mcg total) by mouth once daily.    empagliflozin (JARDIANCE) 10 mg tablet Take 1 tablet (10 mg total) by mouth once daily.    furosemide (LASIX) 40 MG tablet Take 1 tablet (40 mg total) by mouth once daily. 1 Tablet Oral Every day (Patient taking differently: Take 40 mg  by mouth once daily. 1 Tablet Oral Every day, has been taking bid for last few weeks per MD orders.)    gabapentin (NEURONTIN) 300 MG capsule Take 1 capsule (300 mg total) by mouth 3 (three) times daily. (Patient taking differently: Take 300 mg by mouth 2 (two) times daily.)    losartan (COZAAR) 50 MG tablet Take 1 tablet (50 mg total) by mouth once daily.    meclizine (ANTIVERT) 25 mg tablet Take 1 tablet (25 mg total) by mouth 3 (three) times daily as needed for Dizziness.    tamsulosin (FLOMAX) 0.4 mg Cap Take 0.4 mg by mouth once daily.    ALPRAZolam (XANAX) 0.25 MG tablet Take 1 tablet (0.25 mg total) by mouth 2 (two) times daily as needed for Anxiety.    tadalafiL (CIALIS) 5 MG tablet Take 1 tablet (5 mg total) by mouth daily as needed for Erectile Dysfunction.     Family History    None       Tobacco Use    Smoking status: Former     Types: Cigars    Smokeless tobacco: Never   Substance and Sexual Activity    Alcohol use: Yes     Comment: occasionally    Drug use: Never    Sexual activity: Not Currently     Review of Systems   Constitutional: Positive for malaise/fatigue.   Cardiovascular:  Positive for dyspnea on exertion. Negative for chest pain.     Objective:     Vital Signs (Most Recent):  Temp: 98 °F (36.7 °C) (03/28/24 1614)  Pulse: 65 (03/28/24 1614)  Resp: 18 (03/28/24 1614)  BP: (!) 111/59 (03/28/24 1614)  SpO2: 96 % (03/28/24 1614) Vital Signs (24h Range):  Temp:  [97.3 °F (36.3 °C)-98.6 °F (37 °C)] 98 °F (36.7 °C)  Pulse:  [57-77] 65  Resp:  [15-20] 18  SpO2:  [95 %-100 %] 96 %  BP: (101-143)/(51-73) 111/59     Weight: 119 kg (262 lb 5.6 oz)  Body mass index is 37.64 kg/m².    SpO2: 96 %         Intake/Output Summary (Last 24 hours) at 3/28/2024 1633  Last data filed at 3/28/2024 1021  Gross per 24 hour   Intake 360 ml   Output 225 ml   Net 135 ml       Lines/Drains/Airways       Peripheral Intravenous Line  Duration                  Peripheral IV - Single Lumen 03/27/24 2100 20 G Right  Antecubital <1 day                     Physical Exam  Constitutional:       General: He is not in acute distress.     Appearance: He is obese. He is not ill-appearing.   HENT:      Head: Normocephalic and atraumatic.      Nose: No congestion.      Mouth/Throat:      Mouth: Mucous membranes are moist.   Eyes:      Conjunctiva/sclera: Conjunctivae normal.   Cardiovascular:      Rate and Rhythm: Normal rate and regular rhythm.      Heart sounds: Murmur heard.   Pulmonary:      Effort: Pulmonary effort is normal. No respiratory distress.   Abdominal:      General: Abdomen is flat. There is no distension.      Palpations: Abdomen is soft.   Musculoskeletal:      Cervical back: Normal range of motion.      Right lower leg: No edema.      Left lower leg: No edema.   Skin:     Capillary Refill: Capillary refill takes less than 2 seconds.      Findings: No rash.   Neurological:      Mental Status: He is alert and oriented to person, place, and time.   Psychiatric:         Mood and Affect: Mood normal.            Significant Labs: All pertinent lab results from the last 24 hours have been reviewed.    Assessment and Plan:     Cardiac/Vascular  Severe mitral regurgitation  Likely functional severe MR based on MINISTERIO    Recommendations  HTS consulted for GDMT and diuresis management  Will need to be on at least 1 month of maximally tolerated GDMT prior to potential mitraclip  Plan for LHC on 4/1 while inpatient unless patient and team feel strongly about discharge prior to this. NPO at midnight on Sunday. Patient is agreeable at this time.   Care discussed with Dr. Marroquin.         VTE Risk Mitigation (From admission, onward)           Ordered     apixaban tablet 5 mg  2 times daily         03/28/24 0044     IP VTE HIGH RISK PATIENT  Once         03/28/24 0044     Place sequential compression device  Until discontinued         03/28/24 0044     Reason for No Pharmacological VTE Prophylaxis  Once        Question:  Reasons:  Answer:   Already adequately anticoagulated on oral Anticoagulants    03/28/24 0044                      Leonard , MD  Interventional Cardiology   [facility] - Cardiology Stepdown

## 2024-03-28 NOTE — H&P
[facility] - Cardiology [facility] Medicine  History & Physical    Patient Name: Franco Cooper  MRN: [id number]  Patient Class: IP- Inpatient  Admission Date: 3/27/2024  Attending Physician:  [facility]  Admitting Physician: Chad Jessica MD  Primary Care Provider: Tomas Hidalgo MD         Patient information was obtained from patient, past medical records, and ER records.     Subjective:     Principal Problem:Acute on chronic combined systolic and diastolic congestive heart failure    Chief Complaint:   Chief Complaint   Patient presents with    Chest Pain     Midsternal CP radiating to left chest and SOB x 1 day. Hx defibrillator         HPI: 58 y/o AAM with NICM EF 15-20%, Severe MR, HTN, SHERYL on CPAP, pAfib, presents to the ED with complaints of chest pain.  He reports that earlier today he had a doctors appointment, when leaving the clinic and walking back to his car, he noted dyspnea on exertion and then developed mid to left sided chest tightness.  Symptoms became more severe throughout the day, rated chest pain 7/10 at its worst.  He did not take any medication at home for symptom relief.  Coughing made the chest tightness worse.       He was evaluated in the ED - CXR with signs of interstitial edema and BNP was elevated, he has no oxygen requirement.  He was given 40mg IV lasix in ED, reports having 1.5-2L of UOP, and during my interview reports that chest pain has resolved.   He does note that his home oral lasix dose 40mg PO BID, has not been producing similar diuretic response as it previously would.     He was admitted to [facility] 2/20-2/25 - found with coag neg staph bacteremia (2/21) surveillance culture negative 2/22, per ID recs treated with IV antibiotics  x 7 days and as outpatient had repeat blood culture on 3/4 (1 set peripheral right) negative.  He had [place]ISTERIO [place] [facility] [place] showed severe MR.  As outpatient has has pending ambulatory referral to [facility]  Interventional cardiology.     Past Medical History:   Diagnosis Date    A-fib     AICD (automatic cardioverter/defibrillator) present     AICD at end of battery life 12/04/2019    Formatting of this note might be different from the original.   Added automatically from request for surgery [id number]    CHF (congestive heart failure)     Community acquired pneumonia of right lung 02/21/2024    DDD (degenerative disc disease), lumbar     HTN (hypertension)     Hydronephrosis of right kidney 07/19/2021    Hyperuricemia 08/02/2021    Lumbar radiculopathy     Mixed hyperlipidemia     NSTEMI (non-ST elevated myocardial infarction) 02/21/2024    Numbness 01/06/2023    SHERYL (obstructive sleep apnea)     Other cardiomyopathies     TIA (transient ischemic attack)     VT (ventricular tachycardia)        Past Surgical History:   Procedure Laterality Date    ECHOCARDIOGRAM,TRANSESOPHAGEAL N/A 3/20/2024    Procedure: Transesophageal echo (MINISTERIO) intra-procedure log documentation;  Surgeon: Provider, Dosc Diagnostic;  Location: [facility] EP LAB;  Service: Cardiology;  Laterality: N/A;    JOINT REPLACEMENT      TONSILLECTOMY         Review of patient's allergies indicates:   Allergen Reactions    Ace inhibitors      cough    Adhesive      Skin rash       No current facility-administered medications on file prior to encounter.     Current Outpatient Medications on File Prior to Encounter   Medication Sig    amiodarone (PACERONE) 200 MG Tab TAKE 2 TABLETS BY MOUTH ONCE DAILY (Patient taking differently: Take 400 mg by mouth once daily.)    apixaban (ELIQUIS) 5 mg Tab Take 1 tablet (5 mg total) by mouth 2 (two) times daily.    aspirin (ECOTRIN) 81 MG EC tablet Take 1 tablet (81 mg total) by mouth once. 1 Tablet, Delayed Release (E.C.) Oral Every day for 1 dose    atorvastatin (LIPITOR) 40 MG tablet Take 1 tablet (40 mg total) by mouth once daily.    carvediloL (COREG) 25 MG tablet Take 1 tablet (25 mg total) by mouth 2 (two) times a day.     digoxin (LANOXIN) 250 mcg tablet Take 1 tablet (250 mcg total) by mouth once daily.    empagliflozin (JARDIANCE) 10 mg tablet Take 1 tablet (10 mg total) by mouth once daily.    furosemide (LASIX) 40 MG tablet Take 1 tablet (40 mg total) by mouth once daily. 1 Tablet Oral Every day (Patient taking differently: Take 40 mg by mouth once daily. 1 Tablet Oral Every day, has been taking bid for last few weeks per MD orders.)    gabapentin (NEURONTIN) 300 MG capsule Take 1 capsule (300 mg total) by mouth 3 (three) times daily. (Patient taking differently: Take 300 mg by mouth 2 (two) times daily.)    losartan (COZAAR) 50 MG tablet Take 1 tablet (50 mg total) by mouth once daily.    meclizine (ANTIVERT) 25 mg tablet Take 1 tablet (25 mg total) by mouth 3 (three) times daily as needed for Dizziness.    tamsulosin (FLOMAX) 0.4 mg Cap Take 0.4 mg by mouth once daily.    ALPRAZolam (XANAX) 0.25 MG tablet Take 1 tablet (0.25 mg total) by mouth 2 (two) times daily as needed for Anxiety.    tadalafiL (CIALIS) 5 MG tablet Take 1 tablet (5 mg total) by mouth daily as needed for Erectile Dysfunction.    [DISCONTINUED] albuterol (PROVENTIL/VENTOLIN HFA) 90 mcg/actuation inhaler Inhale 1-2 puffs into the lungs every 6 (six) hours as needed for Shortness of Breath or Wheezing.    [DISCONTINUED] allopurinoL (ZYLOPRIM) 100 MG tablet Take 1 tablet by mouth once daily.    [DISCONTINUED] traMADoL (ULTRAM) 50 mg tablet Take 50 mg by mouth every 8 (eight) hours as needed for Pain.     Family History    None       Tobacco Use    Smoking status: Former     Types: Cigars    Smokeless tobacco: Never   Substance and Sexual Activity    Alcohol use: Yes     Comment: occasionally    Drug use: Never    Sexual activity: Not Currently     Review of Systems   Constitutional:  Positive for activity change. Negative for chills and fever.   Respiratory:  Positive for chest tightness and shortness of breath.    Cardiovascular:  Negative for palpitations and  "leg swelling.   Gastrointestinal: Negative.    Genitourinary: Negative.    Neurological: Negative.      Objective:     Vital Signs (Most Recent):  Temp: 97.9 °F (36.6 °C) (03/28/24 0115)  Pulse: 68 (03/28/24 0152)  Resp: 18 (03/28/24 0115)  BP: 137/73 (03/28/24 0115)  SpO2: 96 % (03/28/24 0115) Vital Signs (24h Range):  Temp:  [97.3 °F (36.3 °C)-98.6 °F (37 °C)] 97.9 °F (36.6 °C)  Pulse:  [57-73] 68  Resp:  [15-20] 18  SpO2:  [95 %-100 %] 96 %  BP: (116-143)/(59-73) 137/73     Weight: 119 kg (262 lb 5.6 oz)  Body mass index is 37.64 kg/m².     Physical Exam  Vitals and nursing note reviewed.   Constitutional:       General: He is not in acute distress.  HENT:      Head: Normocephalic and atraumatic.   Eyes:      General: No scleral icterus.  Cardiovascular:      Rate and Rhythm: Normal rate and regular rhythm.      Heart sounds: Murmur heard.   Pulmonary:      Effort: Pulmonary effort is normal. No respiratory distress.      Breath sounds: Normal breath sounds. No wheezing or rales.   Abdominal:      General: Bowel sounds are normal. There is no distension.      Palpations: Abdomen is soft.      Tenderness: There is no abdominal tenderness. There is no guarding.   Musculoskeletal:      Cervical back: Neck supple.      Right lower leg: No edema.      Left lower leg: No edema.   Skin:     General: Skin is warm and dry.   Neurological:      General: No focal deficit present.      Mental Status: He is alert and oriented to person, place, and time.                Significant Labs: All pertinent labs within the past 24 hours have been reviewed.  CMP:   Recent Labs   Lab 03/27/24 2058      K 3.6      CO2 27      BUN 19   CREATININE 1.1   CALCIUM 8.7   PROT 6.9   ALBUMIN 2.7*   BILITOT 0.5   ALKPHOS 88   AST 23   ALT 24   ANIONGAP 10     Cardiac Markers:   Recent Labs   Lab 03/27/24 2058   *     Coagulation: No results for input(s): "PT", "INR", "APTT" in the last 48 hours.  Lactic Acid: No " "results for input(s): "LACTATE" in the last 48 hours.  Troponin:   Recent Labs   Lab 03/27/24 2058 03/27/24  2243   TROPONINI 0.051* 0.037*     Urine Studies: No results for input(s): "COLORU", "APPEARANCEUA", "PHUR", "SPECGRAV", "PROTEINUA", "GLUCUA", "KETONESU", "BILIRUBINUA", "OCCULTUA", "NITRITE", "UROBILINOGEN", "LEUKOCYTESUR", "RBCUA", "WBCUA", "BACTERIA", "SQUAMEPITHEL", "HYALINECASTS" in the last 48 hours.    Invalid input(s): "WRIGHTSUR"    Significant Imaging: I have reviewed all pertinent imaging results/findings within the past 24 hours.  XR CHEST AP PORTABLE     CLINICAL HISTORY:  Provided history is "Chest Pain;  ".     TECHNIQUE:  One view of the chest.     COMPARISON:  12/20/2023.     FINDINGS:  Cardiac wires overlie the chest.  Cardiomediastinal silhouette is enlarged and similar to the prior study.  Lung volumes are relatively low, accentuating basilar markings.  Left chest wall AICD is again present.  Central vascular congestion with minimal perihilar interstitial densities.  No confluent area of consolidation.  No large pleural effusion.  No pneumothorax.     Impression:     Cardiomegaly and mild interstitial edema.  No large focal consolidation identified on this single view.        Electronically signed by: Giacomo Aguiar MD  Date:                                            03/27/2024  Time:                                           20:38  Assessment/Plan:     * Acute on chronic combined systolic and diastolic congestive heart failure  Patient is identified as having Combined Systolic and Diastolic heart failure that is Acute on chronic. CHF is currently uncontrolled due to Pulmonary edema/pleural effusion on CXR.     . Continue Beta Blocker, ACE/ARB, and Furosemide and monitor clinical status closely. Monitor on telemetry. Patient is off CHF pathway.  Monitor strict Is&Os and daily weights.  Place on fluid restriction of 1.5 L.     Given recent admission and newer finding of severe MR - " interventional cardiology consult placed to evalaute for his valvular disease as it may be contributing to more frequent admissions. Continue to stress to patient importance of self efficacy and  on diet for CHF. Last BNP reviewed- and noted below   Recent Labs   Lab 03/27/24 2058   *       Other chest pain  -pain resolved after IV lasix given  -trending troponin     Nonrheumatic mitral valve regurgitation  Interventional cardiology consult in AM       History of ventricular tachycardia  -s/p AICD placement  -continue home amiodarone 400mg po daily       Nonischemic cardiomyopathy  Continue his goal directed therapy  -patient has not been on entresto before.       Essential hypertension  Chronic, controlled. Latest blood pressure and vitals reviewed-     Temp:  [97.3 °F (36.3 °C)-98.6 °F (37 °C)]   Pulse:  [57-73]   Resp:  [15-20]   BP: (116-143)/(59-73)   SpO2:  [95 %-100 %] .   Home meds for hypertension were reviewed and noted below.   Hypertension Medications               carvediloL (COREG) 25 MG tablet Take 1 tablet (25 mg total) by mouth 2 (two) times a day.    furosemide (LASIX) 40 MG tablet Take 1 tablet (40 mg total) by mouth once daily. 1 Tablet Oral Every day    losartan (COZAAR) 50 MG tablet Take 1 tablet (50 mg total) by mouth once daily.            While in the hospital, will manage blood pressure as follows; Continue home antihypertensive regimen    Paroxysmal atrial fibrillation  Patient with Paroxysmal (<7 days) atrial fibrillation which is controlled currently with Beta Blocker, Amiodarone, and Digoxin. Patient is currently in sinus rhythm.[id number] Score: The patient doesn't have any registry metric data available.  Anticoagulation indicated. Anticoagulation done with apixaban .    Automatic implantable cardioverter-defibrillator in situ  Check cardiac device check to eval if any occult arrhythmia to cause his symptoms.       BMI 39.0-39.9,adult  Body mass index is 37.64 kg/m².  Morbid obesity complicates all aspects of disease management from diagnostic modalities to treatment.         VTE Risk Mitigation (From admission, onward)           Ordered     apixaban tablet 5 mg  2 times daily         03/28/24 0044     IP VTE HIGH RISK PATIENT  Once         03/28/24 0044     Place sequential compression device  Until discontinued         03/28/24 0044     Reason for No Pharmacological VTE Prophylaxis  Once        Question:  Reasons:  Answer:  Already adequately anticoagulated on oral Anticoagulants    03/28/24 0044                       Chad Jessica MD  Department of Hospital Medicine  [facility] - Cardiology Stepdown

## 2024-03-29 NOTE — SUBJECTIVE & OBJECTIVE
Interval History: Seen this AM at bedside. Shortness of breath improved. Denies fevers, chills, cough, abdominal pain    Review of Systems  Objective:     Vital Signs (Most Recent):  Temp: 98.1 °F (36.7 °C) (03/29/24 1152)  Pulse: 65 (03/29/24 1152)  Resp: 19 (03/29/24 1152)  BP: (!) 96/56 (03/29/24 1152)  SpO2: (!) 93 % (03/29/24 1152) Vital Signs (24h Range):  Temp:  [97.7 °F (36.5 °C)-98.3 °F (36.8 °C)] 98.1 °F (36.7 °C)  Pulse:  [59-70] 65  Resp:  [15-19] 19  SpO2:  [93 %-100 %] 93 %  BP: ()/(56-67) 96/56     Weight: 116.8 kg (257 lb 8 oz)  Body mass index is 36.95 kg/m².    Intake/Output Summary (Last 24 hours) at 3/29/2024 1221  Last data filed at 3/29/2024 0503  Gross per 24 hour   Intake 594 ml   Output 560 ml   Net 34 ml           Physical Exam  Vitals and nursing note reviewed.   Constitutional:       General: He is not in acute distress.     Appearance: Normal appearance. He is not ill-appearing or toxic-appearing.   HENT:      Head: Normocephalic and atraumatic.   Eyes:      Pupils: Pupils are equal, round, and reactive to light.   Cardiovascular:      Rate and Rhythm: Normal rate and regular rhythm.      Heart sounds: Murmur heard.   Pulmonary:      Effort: Pulmonary effort is normal. No respiratory distress.      Breath sounds: Normal breath sounds. No wheezing or rales.   Abdominal:      General: Bowel sounds are normal. There is no distension.      Palpations: Abdomen is soft.      Tenderness: There is no abdominal tenderness. There is no guarding.   Musculoskeletal:      Cervical back: Normal range of motion.      Right lower leg: No edema.      Left lower leg: No edema.   Skin:     General: Skin is warm and dry.   Neurological:      General: No focal deficit present.      Mental Status: He is alert and oriented to person, place, and time.   Psychiatric:         Mood and Affect: Mood normal.         Behavior: Behavior normal.             Significant Labs: All pertinent labs within the past 24  hours have been reviewed.    Significant Imaging: I have reviewed all pertinent imaging results/findings within the past 24 hours.

## 2024-03-29 NOTE — ASSESSMENT & PLAN NOTE
Patient is identified as having Combined Systolic and Diastolic heart failure that is Acute on chronic. CHF is currently uncontrolled due to Pulmonary edema/pleural effusion on CXR. Continue Beta Blocker, ACE/ARB, and Furosemide and monitor clinical status closely. Monitor on telemetry. Patient is off CHF pathway.  Monitor strict Is&Os and daily weights.  Place on fluid restriction of 1.5 L.     Recent Labs   Lab 03/27/24 2058   *       -Admitted with clinical volume overload, conistent with CHF exacerbation  -numerous admissions likely related to severe MR, most recently demonstrated on flaquita from 03/20/24  -CXR w interstitial edema, BNP per above  -Home diuretic: lasix 40mg daily  Plan:  -HTS team consulted to assist with GDMT management   --Replace losartan with entresto. Start aldactone. EP and HTS referrals outpatient for CRT upgrade and GDMT management, respectively  -Transition back to po home diuretic as clinically euvolemic: lasix 40mg daily  -Daily weights, strict I&O, fluid restriction  -MR management per below

## 2024-03-29 NOTE — ASSESSMENT & PLAN NOTE
Check cardiac device check to eval if any occult arrhythmia to cause his symptoms: reported one second episode of NSVT, otherwise no abnormalities  EP referral on discharge for CRT upgrade after GDMT optimization given history of LBBB

## 2024-03-29 NOTE — PROGRESS NOTES
Bud Seay - Cardiology [facility] Medicine  Progress Note    Patient Name: Franco Cooper  MRN: [id number]  Patient Class: IP- Inpatient   Admission Date: 3/27/2024  Length of Stay: 2 days  Attending Physician: Genaro Peng DO  Primary Care Provider: Tomas Hidalgo MD        Subjective:     Principal Problem:Acute on chronic combined systolic and diastolic congestive heart failure        HPI:  58 y/o AAM with NICM EF 15-20%, Severe MR, HTN, SHERYL on CPAP, pAfib, presents to the ED with complaints of chest pain.  He reports that earlier today he had a doctors appointment, when leaving the clinic and walking back to his car, he noted dyspnea on exertion and then developed mid to left sided chest tightness.  Symptoms became more severe throughout the day, rated chest pain 7/10 at its worst.  He did not take any medication at home for symptom relief.  Coughing made the chest tightness worse.       He was evaluated in the ED - CXR with signs of interstitial edema and BNP was elevated, he has no oxygen requirement.  He was given 40mg IV lasix in ED, reports having 1.5-2L of UOP, and during my interview reports that chest pain has resolved.   He does note that his home oral lasix dose 40mg PO BID, has not been producing similar diuretic response as it previously would.     He was admitted to [facility] 2/20-2/25 - found with coag neg staph bacteremia (2/21) surveillance culture negative 2/22, per ID recs treated with IV antibiotics  x 7 days and as outpatient had repeat blood culture on 3/4 (1 set peripheral right) negative.  He had [place]IO [place] [facility] [place] showed severe MR.  As outpatient has has pending ambulatory referral to HTS & Interventional cardiology.     Overview/Hospital Course:  Admitted with chest pain and dyspnea on exertion.  CXR showing signs of volume overload, improving with IV diuresis.  Numerous admissions likely related to severe MR, most recently demonstrated on MINISTERIO from  03/20.  Interventional Cardiology consulted, recommending HTS team for GDMT optimization as patient will need to be on at least 1 month maximally tolerated of GDMT prior to potential mitraclip. Introducing GDMT as tolerated. [facility] planned for 4/1 for further workup leading to clip    Interval History: Seen this AM at bedside. Shortness of breath improved. Denies fevers, chills, cough, abdominal pain    Review of Systems  Objective:     Vital Signs (Most Recent):  Temp: 98.1 °F (36.7 °C) (03/29/24 1152)  Pulse: 65 (03/29/24 1152)  Resp: 19 (03/29/24 1152)  BP: (!) 96/56 (03/29/24 1152)  SpO2: (!) 93 % (03/29/24 1152) Vital Signs (24h Range):  Temp:  [97.7 °F (36.5 °C)-98.3 °F (36.8 °C)] 98.1 °F (36.7 °C)  Pulse:  [59-70] 65  Resp:  [15-19] 19  SpO2:  [93 %-100 %] 93 %  BP: ()/(56-67) 96/56     Weight: 116.8 kg (257 lb 8 oz)  Body mass index is 36.95 kg/m².    Intake/Output Summary (Last 24 hours) at 3/29/2024 1221  Last data filed at 3/29/2024 0503  Gross per 24 hour   Intake 594 ml   Output 560 ml   Net 34 ml           Physical Exam  Vitals and nursing note reviewed.   Constitutional:       General: He is not in acute distress.     Appearance: Normal appearance. He is not ill-appearing or toxic-appearing.   HENT:      Head: Normocephalic and atraumatic.   Eyes:      Pupils: Pupils are equal, round, and reactive to light.   Cardiovascular:      Rate and Rhythm: Normal rate and regular rhythm.      Heart sounds: Murmur heard.   Pulmonary:      Effort: Pulmonary effort is normal. No respiratory distress.      Breath sounds: Normal breath sounds. No wheezing or rales.   Abdominal:      General: Bowel sounds are normal. There is no distension.      Palpations: Abdomen is soft.      Tenderness: There is no abdominal tenderness. There is no guarding.   Musculoskeletal:      Cervical back: Normal range of motion.      Right lower leg: No edema.      Left lower leg: No edema.   Skin:     General: Skin is warm and dry.    Neurological:      General: No focal deficit present.      Mental Status: He is alert and oriented to person, place, and time.   Psychiatric:         Mood and Affect: Mood normal.         Behavior: Behavior normal.             Significant Labs: All pertinent labs within the past 24 hours have been reviewed.    Significant Imaging: I have reviewed all pertinent imaging results/findings within the past 24 hours.    Assessment/Plan:      * Acute on chronic combined systolic and diastolic congestive heart failure  Patient is identified as having Combined Systolic and Diastolic heart failure that is Acute on chronic. CHF is currently uncontrolled due to Pulmonary edema/pleural effusion on CXR. Continue Beta Blocker, ACE/ARB, and Furosemide and monitor clinical status closely. Monitor on telemetry. Patient is off CHF pathway.  Monitor strict Is&Os and daily weights.  Place on fluid restriction of 1.5 L.     Recent Labs   Lab 03/27/24 2058   *       -Admitted with clinical volume overload, conistent with CHF exacerbation  -numerous admissions likely related to severe MR, most recently demonstrated on flaquita from 03/20/24  -CXR w interstitial edema, BNP per above  -Home diuretic: lasix 40mg daily  Plan:  -HTS team consulted to assist with GDMT management   --Replace losartan with entresto. Start aldactone. EP and HTS referrals outpatient for CRT upgrade and GDMT management, respectively  -Transition back to po home diuretic as clinically euvolemic: lasix 40mg daily  -Daily weights, strict I&O, fluid restriction  -MR management per below    Nonrheumatic mitral valve regurgitation  -Possibly contributing to volume overload, recurrent admissions  -FLAQUITA 3/20/24:  Moderately dilated LA.  Severe regurgitation of the mitral valve/no stenosis  Plan:  -discussed case with interventional Cardiology, recommending HTS team for GDMT optimization before proceeding with angiogram (likely Monday) for further workup in preparation for  surgical clip            Other chest pain  -pain resolved after IV lasix given  -trending troponin     History of ventricular tachycardia  -s/p AICD placement  -continue home amiodarone 400mg po daily       Paroxysmal atrial fibrillation  Patient with Paroxysmal (<7 days) atrial fibrillation which is controlled currently with Beta Blocker, Amiodarone, and Digoxin. Patient is currently in sinus rhythm.[id number] Score: The patient doesn't have any registry metric data available.  Anticoagulation indicated. Anticoagulation done with apixaban .    BMI 39.0-39.9,adult  Body mass index is 37.64 kg/m². Morbid obesity complicates all aspects of disease management from diagnostic modalities to treatment.       Nonischemic cardiomyopathy  Continue his goal directed therapy  -patient has not been on entresto before.       Essential hypertension  Chronic, controlled. Latest blood pressure and vitals reviewed-     Temp:  [97.3 °F (36.3 °C)-98.6 °F (37 °C)]   Pulse:  [57-73]   Resp:  [15-20]   BP: (116-143)/(59-73)   SpO2:  [95 %-100 %] .   Home meds for hypertension were reviewed and noted below.   Hypertension Medications               carvediloL (COREG) 25 MG tablet Take 1 tablet (25 mg total) by mouth 2 (two) times a day.    furosemide (LASIX) 40 MG tablet Take 1 tablet (40 mg total) by mouth once daily. 1 Tablet Oral Every day    losartan (COZAAR) 50 MG tablet Take 1 tablet (50 mg total) by mouth once daily.            While in the hospital, will manage blood pressure as follows; Continue home antihypertensive regimen    Automatic implantable cardioverter-defibrillator in situ  Check cardiac device check to eval if any occult arrhythmia to cause his symptoms: reported one second episode of NSVT, otherwise no abnormalities  EP referral on discharge for CRT upgrade after GDMT optimization given history of LBBB        VTE Risk Mitigation (From admission, onward)           Ordered     apixaban tablet 5 mg  2 times daily          03/28/24 0044     IP VTE HIGH RISK PATIENT  Once         03/28/24 0044     Place sequential compression device  Until discontinued         03/28/24 0044     Reason for No Pharmacological VTE Prophylaxis  Once        Question:  Reasons:  Answer:  Already adequately anticoagulated on oral Anticoagulants    03/28/24 0044                    Discharge Planning   NARINDER: 4/2/2024     Code Status: Full Code   Is the patient medically ready for discharge?: No    Reason for patient still in hospital (select all that apply): Patient trending condition  Discharge Plan A: Home                  Genaro Peng DO  Department of Hospital Medicine   [facility] - Cardiology Stepdown

## 2024-03-29 NOTE — PLAN OF CARE
AAOx4, denies chest pain. NSR 60s.  Patient  agreeable to plan of care. Diet reviewed and fluid restriction. VSS   On IVP lasix, output monitored  Problem: Adult Inpatient Plan of Care  Goal: Plan of Care Review  3/29/2024 0134 by Mely Mayfield RN  Outcome: Ongoing, Progressing  3/29/2024 0134 by Mely Mayfield RN  Outcome: Ongoing, Progressing  Goal: Patient-Specific Goal (Individualized)  3/29/2024 0134 by Mely Mayfield RN  Outcome: Ongoing, Progressing  3/29/2024 0134 by Mely Mayfield RN  Outcome: Ongoing, Progressing  Goal: Absence of Hospital-Acquired Illness or Injury  3/29/2024 0134 by Mely Mayfield RN  Outcome: Ongoing, Progressing  3/29/2024 0134 by Mely Mayfield RN  Outcome: Ongoing, Progressing  Goal: Optimal Comfort and Wellbeing  3/29/2024 0134 by Mely Mayfield RN  Outcome: Ongoing, Progressing  3/29/2024 0134 by Mely Mayfield RN  Outcome: Ongoing, Progressing  Goal: Readiness for Transition of Care  3/29/2024 0134 by Mely Mayfield RN  Outcome: Ongoing, Progressing  3/29/2024 0134 by Mely Mayfield RN  Outcome: Ongoing, Progressing     Problem: Infection  Goal: Absence of Infection Signs and Symptoms  3/29/2024 0134 by Mely Mayfield RN  Outcome: Ongoing, Progressing  3/29/2024 0134 by Mely Mayfield RN  Outcome: Ongoing, Progressing     Problem: Fall Injury Risk  Goal: Absence of Fall and Fall-Related Injury  3/29/2024 0134 by Mely Mayfield RN  Outcome: Ongoing, Progressing  3/29/2024 0134 by Mely Mayfield RN  Outcome: Ongoing, Progressing

## 2024-03-29 NOTE — ASSESSMENT & PLAN NOTE
-Possibly contributing to volume overload, recurrent admissions  -MINISTERIO 3/20/24:  Moderately dilated LA.  Severe regurgitation of the mitral valve/no stenosis  Plan:  -discussed case with interventional Cardiology, recommending HTS team consult for GDMT optimization before proceeding with angiogram (likely Monday) for further workup in preparation for surgical clip  -Cont GDMT per above

## 2024-03-30 NOTE — PROGRESS NOTES
Bud Seay - Cardiology [facility] Medicine  Progress Note    Patient Name: Franco Cooper  MRN: [id number]  Patient Class: IP- Inpatient   Admission Date: 3/27/2024  Length of Stay: 3 days  Attending Physician: Genaro Peng DO  Primary Care Provider: Tomas Hidalgo MD        Subjective:     Principal Problem:Acute on chronic combined systolic and diastolic congestive heart failure        HPI:  58 y/o AAM with NICM EF 15-20%, Severe MR, HTN, SHERYL on CPAP, pAfib, presents to the ED with complaints of chest pain.  He reports that earlier today he had a doctors appointment, when leaving the clinic and walking back to his car, he noted dyspnea on exertion and then developed mid to left sided chest tightness.  Symptoms became more severe throughout the day, rated chest pain 7/10 at its worst.  He did not take any medication at home for symptom relief.  Coughing made the chest tightness worse.       He was evaluated in the ED - CXR with signs of interstitial edema and BNP was elevated, he has no oxygen requirement.  He was given 40mg IV lasix in ED, reports having 1.5-2L of UOP, and during my interview reports that chest pain has resolved.   He does note that his home oral lasix dose 40mg PO BID, has not been producing similar diuretic response as it previously would.     He was admitted to [facility] 2/20-2/25 - found with coag neg staph bacteremia (2/21) surveillance culture negative 2/22, per ID recs treated with IV antibiotics  x 7 days and as outpatient had repeat blood culture on 3/4 (1 set peripheral right) negative.  He had MINISTERIO performed @ [facility] after this admission showed severe MR.  As outpatient has has pending ambulatory referral to HTS & Interventional cardiology.     Overview/Hospital Course:  Admitted with chest pain and dyspnea on exertion.  CXR showing signs of volume overload, improving with IV diuresis.  Numerous admissions likely related to severe MR, most recently demonstrated on MINISTERIO from  03/20.  Interventional Cardiology consulted, recommending HTS team for GDMT optimization as patient will need to be on at least 1 month maximally tolerated of GDMT prior to potential mitraclip. Introducing GDMT as tolerated. [facility] planned for 4/1 for further workup leading to clip    Interval History: Seen this AM at bedside. Shortness of breath improved. Denies fevers, chills, cough, abdominal pain    Review of Systems  Objective:     Vital Signs (Most Recent):  Temp: 98.1 °F (36.7 °C) (03/29/24 1152)  Pulse: 65 (03/29/24 1152)  Resp: 19 (03/29/24 1152)  BP: (!) 96/56 (03/29/24 1152)  SpO2: (!) 93 % (03/29/24 1152) Vital Signs (24h Range):  Temp:  [97.7 °F (36.5 °C)-98.3 °F (36.8 °C)] 98.1 °F (36.7 °C)  Pulse:  [59-70] 65  Resp:  [15-19] 19  SpO2:  [93 %-100 %] 93 %  BP: ()/(56-67) 96/56     Weight: 116.8 kg (257 lb 8 oz)  Body mass index is 36.95 kg/m².    Intake/Output Summary (Last 24 hours) at 3/29/2024 1221  Last data filed at 3/29/2024 0503  Gross per 24 hour   Intake 594 ml   Output 560 ml   Net 34 ml           Physical Exam  Vitals and nursing note reviewed.   Constitutional:       General: He is not in acute distress.     Appearance: Normal appearance. He is not ill-appearing or toxic-appearing.   HENT:      Head: Normocephalic and atraumatic.   Eyes:      Pupils: Pupils are equal, round, and reactive to light.   Cardiovascular:      Rate and Rhythm: Normal rate and regular rhythm.      Heart sounds: Murmur heard.   Pulmonary:      Effort: Pulmonary effort is normal. No respiratory distress.      Breath sounds: Normal breath sounds. No wheezing or rales.   Abdominal:      General: Bowel sounds are normal. There is no distension.      Palpations: Abdomen is soft.      Tenderness: There is no abdominal tenderness. There is no guarding.   Musculoskeletal:      Cervical back: Normal range of motion.      Right lower leg: No edema.      Left lower leg: No edema.   Skin:     General: Skin is warm and dry.    Neurological:      General: No focal deficit present.      Mental Status: He is alert and oriented to person, place, and time.   Psychiatric:         Mood and Affect: Mood normal.         Behavior: Behavior normal.             Significant Labs: All pertinent labs within the past 24 hours have been reviewed.    Significant Imaging: I have reviewed all pertinent imaging results/findings within the past 24 hours.    Assessment/Plan:      * Acute on chronic combined systolic and diastolic congestive heart failure  Patient is identified as having Combined Systolic and Diastolic heart failure that is Acute on chronic. CHF is currently uncontrolled due to Pulmonary edema/pleural effusion on CXR. Continue Beta Blocker, ACE/ARB, and Furosemide and monitor clinical status closely. Monitor on telemetry. Patient is off CHF pathway.  Monitor strict Is&Os and daily weights.  Place on fluid restriction of 1.5 L.     Recent Labs   Lab 03/27/24 2058   *       -Admitted with clinical volume overload, conistent with CHF exacerbation  -numerous admissions likely related to severe MR, most recently demonstrated on flaquita from 03/20/24  -CXR w interstitial edema, BNP per above  -Home diuretic: lasix 40mg daily  Plan:  -HTS team consulted to assist with GDMT management   --Replace losartan with entresto. Start aldactone. EP and HTS referrals outpatient for CRT upgrade and GDMT management, respectively  -Transition back to po home diuretic as clinically euvolemic: lasix 40mg daily  -Daily weights, strict I&O, fluid restriction  -MR management per below    Nonrheumatic mitral valve regurgitation  -Possibly contributing to volume overload, recurrent admissions  -FLAQUITA 3/20/24:  Moderately dilated LA.  Severe regurgitation of the mitral valve/no stenosis  Plan:  -discussed case with interventional Cardiology, recommending HTS team for GDMT optimization before proceeding with angiogram (likely Monday) for further workup in preparation for  surgical clip            Other chest pain  -pain resolved after IV lasix given  -trending troponin     History of ventricular tachycardia  -s/p AICD placement  -continue home amiodarone 400mg po daily       Paroxysmal atrial fibrillation  Patient with Paroxysmal (<7 days) atrial fibrillation which is controlled currently with Beta Blocker, Amiodarone, and Digoxin. Patient is currently in sinus rhythm.[id number] Score: The patient doesn't have any registry metric data available.  Anticoagulation indicated. Anticoagulation done with apixaban .    BMI 39.0-39.9,adult  Body mass index is 37.64 kg/m². Morbid obesity complicates all aspects of disease management from diagnostic modalities to treatment.       Nonischemic cardiomyopathy  Continue his goal directed therapy  -patient has not been on entresto before.       Essential hypertension  Chronic, controlled. Latest blood pressure and vitals reviewed-     Temp:  [97.3 °F (36.3 °C)-98.6 °F (37 °C)]   Pulse:  [57-73]   Resp:  [15-20]   BP: (116-143)/(59-73)   SpO2:  [95 %-100 %] .   Home meds for hypertension were reviewed and noted below.   Hypertension Medications               carvediloL (COREG) 25 MG tablet Take 1 tablet (25 mg total) by mouth 2 (two) times a day.    furosemide (LASIX) 40 MG tablet Take 1 tablet (40 mg total) by mouth once daily. 1 Tablet Oral Every day    losartan (COZAAR) 50 MG tablet Take 1 tablet (50 mg total) by mouth once daily.            While in the hospital, will manage blood pressure as follows; Continue home antihypertensive regimen    Automatic implantable cardioverter-defibrillator in situ  Check cardiac device check to eval if any occult arrhythmia to cause his symptoms: reported one second episode of NSVT, otherwise no abnormalities  EP referral on discharge for CRT upgrade after GDMT optimization given history of LBBB        VTE Risk Mitigation (From admission, onward)           Ordered     apixaban tablet 5 mg  2 times daily          03/28/24 0044     IP VTE HIGH RISK PATIENT  Once         03/28/24 0044     Place sequential compression device  Until discontinued         03/28/24 0044     Reason for No Pharmacological VTE Prophylaxis  Once        Question:  Reasons:  Answer:  Already adequately anticoagulated on oral Anticoagulants    03/28/24 0044                    Discharge Planning   NAIRNDER: 4/2/2024     Code Status: Full Code   Is the patient medically ready for discharge?: No    Reason for patient still in hospital (select all that apply): Patient trending condition  Discharge Plan A: Home                  Genaro Peng DO  Department of Hospital Medicine   [facility] - Cardiology Stepdown     Unclear etiology - outpt labs from 8/16 show plt count of 97k, now plts 86k on two separate draws  Will need to monitor plts while on heparin gtt

## 2024-03-31 NOTE — PROGRESS NOTES
Bud Seay - Cardiology [facility] Medicine  Progress Note    Patient Name: Franco Cooper  MRN: [id number]  Patient Class: IP- Inpatient   Admission Date: 3/27/2024  Length of Stay: 4 days  Attending Physician: Genaro Peng DO  Primary Care Provider: Tomas Hidalgo MD        Subjective:     Principal Problem:Acute on chronic combined systolic and diastolic congestive heart failure        HPI:  60 y/o AAM with NICM EF 15-20%, Severe MR, HTN, SHERYL on CPAP, pAfib, presents to the ED with complaints of chest pain.  He reports that earlier today he had a doctors appointment, when leaving the clinic and walking back to his car, he noted dyspnea on exertion and then developed mid to left sided chest tightness.  Symptoms became more severe throughout the day, rated chest pain 7/10 at its worst.  He did not take any medication at home for symptom relief.  Coughing made the chest tightness worse.       He was evaluated in the ED - CXR with signs of interstitial edema and BNP was elevated, he has no oxygen requirement.  He was given 40mg IV lasix in ED, reports having 1.5-2L of UOP, and during my interview reports that chest pain has resolved.   He does note that his home oral lasix dose 40mg PO BID, has not been producing similar diuretic response as it previously would.     He was admitted to [facility] 2/20-2/25 - found with coag neg staph bacteremia (2/21) surveillance culture negative 2/22, per ID recs treated with IV antibiotics  x 7 days and as outpatient had repeat blood culture on 3/4 (1 set peripheral right) negative.  [place] [facility] [place].  As outpatient has has pending ambulatory referral to HTS & Interventional cardiology.     Overview/Hospital Course:  Admitted with chest pain and dyspnea on exertion.  CXR showing signs of volume overload, improving with IV diuresis.  Numerous admissions likely related to severe MR, most recently demonstrated on MINISTERIO from  03/20.  Interventional Cardiology consulted, recommending HTS team for GDMT optimization as patient will need to be on at least 1 month maximally tolerated of GDMT prior to potential mitraclip. Introducing GDMT as tolerated. [facility] planned for 4/1 for further workup leading to clip    Interval History: Seen this AM at bedside. Shortness of breath improved. Denies fevers, chills, cough, abdominal pain    Review of Systems  Objective:     Vital Signs (Most Recent):  Temp: 98.1 °F (36.7 °C) (03/29/24 1152)  Pulse: 65 (03/29/24 1152)  Resp: 19 (03/29/24 1152)  BP: (!) 96/56 (03/29/24 1152)  SpO2: (!) 93 % (03/29/24 1152) Vital Signs (24h Range):  Temp:  [97.7 °F (36.5 °C)-98.3 °F (36.8 °C)] 98.1 °F (36.7 °C)  Pulse:  [59-70] 65  Resp:  [15-19] 19  SpO2:  [93 %-100 %] 93 %  BP: ()/(56-67) 96/56     Weight: 116.8 kg (257 lb 8 oz)  Body mass index is 36.95 kg/m².    Intake/Output Summary (Last 24 hours) at 3/29/2024 1221  Last data filed at 3/29/2024 0503  Gross per 24 hour   Intake 594 ml   Output 560 ml   Net 34 ml           Physical Exam  Vitals and nursing note reviewed.   Constitutional:       General: He is not in acute distress.     Appearance: Normal appearance. He is not ill-appearing or toxic-appearing.   HENT:      Head: Normocephalic and atraumatic.   Eyes:      Pupils: Pupils are equal, round, and reactive to light.   Cardiovascular:      Rate and Rhythm: Normal rate and regular rhythm.      Heart sounds: Murmur heard.   Pulmonary:      Effort: Pulmonary effort is normal. No respiratory distress.      Breath sounds: Normal breath sounds. No wheezing or rales.   Abdominal:      General: Bowel sounds are normal. There is no distension.      Palpations: Abdomen is soft.      Tenderness: There is no abdominal tenderness. There is no guarding.   Musculoskeletal:      Cervical back: Normal range of motion.      Right lower leg: No edema.      Left lower leg: No edema.   Skin:     General: Skin is warm and dry.    Neurological:      General: No focal deficit present.      Mental Status: He is alert and oriented to person, place, and time.   Psychiatric:         Mood and Affect: Mood normal.         Behavior: Behavior normal.             Significant Labs: All pertinent labs within the past 24 hours have been reviewed.    Significant Imaging: I have reviewed all pertinent imaging results/findings within the past 24 hours.    Assessment/Plan:      * Acute on chronic combined systolic and diastolic congestive heart failure  Patient is identified as having Combined Systolic and Diastolic heart failure that is Acute on chronic. CHF is currently uncontrolled due to Pulmonary edema/pleural effusion on CXR. Continue Beta Blocker, ACE/ARB, and Furosemide and monitor clinical status closely. Monitor on telemetry. Patient is off CHF pathway.  Monitor strict Is&Os and daily weights.  Place on fluid restriction of 1.5 L.     Recent Labs   Lab 03/27/24 2058   *       -Admitted with clinical volume overload, conistent with CHF exacerbation  -numerous admissions likely related to severe MR, most recently demonstrated on flaquita from 03/20/24  -CXR w interstitial edema, BNP per above  -Home diuretic: lasix 40mg daily  Plan:  -HTS team consulted to assist with GDMT management   --Replace losartan with entresto. Start aldactone. EP and HTS referrals outpatient for CRT upgrade and GDMT management, respectively  -Transition back to po home diuretic as clinically euvolemic: lasix 40mg daily  -Daily weights, strict I&O, fluid restriction  -MR management per below    Nonrheumatic mitral valve regurgitation  -Possibly contributing to volume overload, recurrent admissions  -FLAQUITA 3/20/24:  Moderately dilated LA.  Severe regurgitation of the mitral valve/no stenosis  Plan:  -discussed case with interventional Cardiology, recommending HTS team consult for GDMT optimization before proceeding with angiogram (likely Monday) for further workup in  preparation for surgical clip  -Cont GDMT per above          Other chest pain  -pain resolved after IV lasix given  -trending troponin     History of ventricular tachycardia  -s/p AICD placement  -continue home amiodarone 400mg po daily       Paroxysmal atrial fibrillation  Patient with Paroxysmal (<7 days) atrial fibrillation which is controlled currently with Beta Blocker, Amiodarone, and Digoxin. Patient is currently in sinus rhythm.[id number] Score: The patient doesn't have any registry metric data available.  Anticoagulation indicated. Anticoagulation done with apixaban .    BMI 39.0-39.9,adult  Body mass index is 37.64 kg/m². Morbid obesity complicates all aspects of disease management from diagnostic modalities to treatment.       Nonischemic cardiomyopathy  Continue his goal directed therapy  -patient has not been on entresto before.       Essential hypertension  Chronic, controlled. Latest blood pressure and vitals reviewed-     Temp:  [97.3 °F (36.3 °C)-98.6 °F (37 °C)]   Pulse:  [57-73]   Resp:  [15-20]   BP: (116-143)/(59-73)   SpO2:  [95 %-100 %] .   Home meds for hypertension were reviewed and noted below.   Hypertension Medications               carvediloL (COREG) 25 MG tablet Take 1 tablet (25 mg total) by mouth 2 (two) times a day.    furosemide (LASIX) 40 MG tablet Take 1 tablet (40 mg total) by mouth once daily. 1 Tablet Oral Every day    losartan (COZAAR) 50 MG tablet Take 1 tablet (50 mg total) by mouth once daily.            While in the hospital, will manage blood pressure as follows; Continue home antihypertensive regimen    Automatic implantable cardioverter-defibrillator in situ  Check cardiac device check to eval if any occult arrhythmia to cause his symptoms: reported one second episode of NSVT, otherwise no abnormalities  EP referral on discharge for CRT upgrade after GDMT optimization given history of LBBB        VTE Risk Mitigation (From admission, onward)           Ordered      apixaban tablet 5 mg  2 times daily         03/28/24 0044     IP VTE HIGH RISK PATIENT  Once         03/28/24 0044     Place sequential compression device  Until discontinued         03/28/24 0044     Reason for No Pharmacological VTE Prophylaxis  Once        Question:  Reasons:  Answer:  Already adequately anticoagulated on oral Anticoagulants    03/28/24 0044                    Discharge Planning   NARINDER: 4/2/2024     Code Status: Full Code   Is the patient medically ready for discharge?: No    Reason for patient still in hospital (select all that apply): Patient trending condition  Discharge Plan A: Home                  Genaro Peng DO  Department of Hospital Medicine   [facility] - Cardiology Stepdown

## 2024-04-01 NOTE — PLAN OF CARE
Pt slept well this shift. A&Ox4. VSS. No reports of pain. CPAP worn throughout shift. RA otherwise. NPO since midnight for L heart cath this AM. Safety precautions in place. Call light in reach. No further concerns noted at this time.    Problem: Adult Inpatient Plan of Care  Goal: Plan of Care Review  Outcome: Ongoing, Progressing  Goal: Patient-Specific Goal (Individualized)  Outcome: Ongoing, Progressing  Goal: Absence of Hospital-Acquired Illness or Injury  Outcome: Ongoing, Progressing  Goal: Optimal Comfort and Wellbeing  Outcome: Ongoing, Progressing     Problem: Infection  Goal: Absence of Infection Signs and Symptoms  Outcome: Ongoing, Progressing     Problem: Fall Injury Risk  Goal: Absence of Fall and Fall-Related Injury  Outcome: Ongoing, Progressing

## 2024-04-01 NOTE — NURSING
Patient being discharged home. Discharge instructions reviewed with patient. PIV and telemetry discontinued. Medications delivered via bedside pharmacy. All personal belongings with patient. Wife brought patient out via wheelchair.

## 2024-04-01 NOTE — DISCHARGE SUMMARY
Bud Seay - Cardiology [facility] Medicine  Discharge Summary      Patient Name: Franco Cooper  MRN: [id number]  LESLI: [id number]  Patient Class: IP- Inpatient  Admission Date: 3/27/2024  Hospital Length of Stay: 5 days  Discharge Date and Time:  04/01/2024 3:21 PM  Attending Physician: Clarice Modi MD   Discharging Provider: Clarice Modi MD  Primary Care Provider: Tomas Hidalgo MD  Hospital Medicine Team: [facility] MED  Clarice Modi MD  Primary Care Team: [facility]OS[facility] MED     HPI:   60 y/o AAM with NICM EF 15-20%, Severe MR, HTN, SHERYL on CPAP, pAfib, presents to the ED with complaints of chest pain.  He reports that earlier today he had a doctors appointment, when leaving the clinic and walking back to his car, he noted dyspnea on exertion and then developed mid to left sided chest tightness.  Symptoms became more severe throughout the day, rated chest pain 7/10 at its worst.  He did not take any medication at home for symptom relief.  Coughing made the chest tightness worse.       He was evaluated in the ED - CXR with signs of interstitial edema and BNP was elevated, he has no oxygen requirement.  He was given 40mg IV lasix in ED, reports having 1.5-2L of UOP, and during my interview reports that chest pain has resolved.   He does note that his home oral lasix dose 40mg PO BID, has not been producing similar diuretic response as it previously would.     He was admitted to [facility] 2/20-2/25 - found with coag neg staph bacteremia (2/21) surveillance culture negative 2/22, per ID recs treated with IV antibiotics  x 7 days and as outpatient had repeat blood culture on 3/4 (1 set peripheral right) negative.  He had MINISTERIO performed @ [facility] after this admission showed severe MR.  As outpatient has has pending ambulatory referral to HTS & Interventional cardiology.     Procedure(s) (LRB):  ANGIOGRAM, CORONARY ARTERY (N/A)      Hospital Course:   Admitted with chest pain and dyspnea on exertion.  CXR showing  signs of volume overload, improved with IV diuresis.  Numerous admissions likely related to severe MR, most recently demonstrated on FLAQUITA from 03/20.  Interventional Cardiology consulted, recommending HTS team for GDMT optimization as patient will need to be on at least 1 month maximally tolerated of GDMT prior to potential mitraclip. Introducing GDMT as tolerated. [facility] planned for 4/1 for further workup leading to clip, showed normal coronaries. Has f/u with [facility], HTS, and Interventional Cardiology. Referred to EP for CRT upgrade with new LBBB. Changed digoxin dosing due to elevated dig level; cont monitoring as outpt.      Goals of Care Treatment Preferences:  Code Status: Full Code      Consults:   Consults (From admission, onward)          Status Ordering Provider     Inpatient consult to Heart Transplant  Once        Provider:  (Not yet assigned)    Completed VESTA SENA     Inpatient consult to Interventional Cardiology  Once        Provider:  (Not yet assigned)    Completed GIN JACKSON            Cardiac/Vascular  * Acute on chronic combined systolic and diastolic congestive heart failure  Patient is identified as having Combined Systolic and Diastolic heart failure that is Acute on chronic. CHF is currently uncontrolled due to Pulmonary edema/pleural effusion on CXR. Continue Beta Blocker, ACE/ARB, and Furosemide and monitor clinical status closely. Monitor on telemetry. Patient is off CHF pathway.  Monitor strict Is&Os and daily weights.  Place on fluid restriction of 1.5 L.     Recent Labs   Lab 03/27/24 2058   *       -Admitted with clinical volume overload, conistent with CHF exacerbation  -numerous admissions likely related to severe MR, most recently demonstrated on flaquita from 03/20/24  -CXR w interstitial edema, BNP per above  -Home diuretic: lasix 40mg daily  Plan:  -HTS team consulted to assist with GDMT management   --Replace losartan with entresto. Start aldactone. EP and HTS  referrals outpatient for CRT upgrade and GDMT management, respectively  -Transition back to po home diuretic as clinically euvolemic: lasix 40mg daily  -Daily weights, strict I&O, fluid restriction  -MR management per below    Nonrheumatic mitral valve regurgitation  -Possibly contributing to volume overload, recurrent admissions  -MINISTERIO 3/20/24:  Moderately dilated LA.  Severe regurgitation of the mitral valve/no stenosis  Plan:  -discussed case with interventional Cardiology, recommending HTS team consult for GDMT optimization before proceeding with angiogram (likely Monday) for further workup in preparation for surgical clip  -Cont GDMT per above          Automatic implantable cardioverter-defibrillator in situ  Check cardiac device check to eval if any occult arrhythmia to cause his symptoms: reported one second episode of NSVT, otherwise no abnormalities  EP referral on discharge for CRT upgrade after GDMT optimization given history of LBBB        Final Active Diagnoses:    Diagnosis Date Noted POA    PRINCIPAL PROBLEM:  Acute on chronic combined systolic and diastolic congestive heart failure [I50.43] 01/18/2022 Yes     Chronic    Other chest pain [R07.89] 03/28/2024 Yes    Severe mitral regurgitation [I34.0] 03/28/2024 Unknown    Nonrheumatic mitral valve regurgitation [I34.0] 02/26/2024 Yes    Paroxysmal atrial fibrillation [I48.0] 01/31/2024 Yes    BMI 39.0-39.9,adult [Z68.39] 01/19/2022 Not Applicable    History of ventricular tachycardia [Z86.79] 01/18/2022 Not Applicable     Chronic    Essential hypertension [I10] 04/10/2018 Yes    Automatic implantable cardioverter-defibrillator in situ [Z95.810] 04/10/2018 Yes    Nonischemic cardiomyopathy [I42.8] 04/10/2018 Yes      Problems Resolved During this Admission:       Discharged Condition: stable    Disposition: Home or Self Care    Follow Up:    Patient Instructions:      ACCEPT - Ambulatory referral/consult to Cardiac Electrophysiology   Standing Status:  Future   Referral Priority: Routine Referral Type: Consultation   Referral Reason: Specialty Services Required   Requested Specialty: Cardiology   Number of Visits Requested: 1     Diet Cardiac     Notify your health care provider if you experience any of the following:  difficulty breathing or increased cough     Notify your health care provider if you experience any of the following:  persistent dizziness, light-headedness, or visual disturbances     Activity as tolerated       Significant Diagnostic Studies: N/A    Pending Diagnostic Studies:       Procedure Component Value Units Date/Time    EKG 12-lead [[id number]]     Order Status: Sent Lab Status: No result            Medications:  Reconciled Home Medications:      Medication List        START taking these medications      sacubitriL-valsartan 24-26 mg per tablet  Commonly known as: ENTRESTO  Take 1 tablet by mouth 2 (two) times daily.     spironolactone 25 MG tablet  Commonly known as: ALDACTONE  Take 0.5 tablets (12.5 mg total) by mouth once daily.  Start taking on: April 2, 2024            CHANGE how you take these medications      digoxin 125 mcg tablet  Commonly known as: LANOXIN  Take 1 tablet (0.125 mg total) by mouth every 48 hours.  Start taking on: April 3, 2024  What changed:   medication strength  how much to take  when to take this     furosemide 40 MG tablet  Commonly known as: LASIX  Take 1 tablet (40 mg total) by mouth once daily. 1 Tablet Oral Every day  What changed: additional instructions            CONTINUE taking these medications      ALPRAZolam 0.25 MG tablet  Commonly known as: XANAX  Take 1 tablet (0.25 mg total) by mouth 2 (two) times daily as needed for Anxiety.     amiodarone 200 MG Tab  Commonly known as: PACERONE  Take 2 tablets (400 mg total) by mouth once daily.     apixaban 5 mg Tab  Commonly known as: ELIQUIS  Take 1 tablet (5 mg total) by mouth 2 (two) times daily.     aspirin 81 MG EC tablet  Commonly known as:  ECOTRIN  Take 1 tablet (81 mg total) by mouth once. 1 Tablet, Delayed Release (E.C.) Oral Every day for 1 dose     atorvastatin 40 MG tablet  Commonly known as: LIPITOR  Take 1 tablet (40 mg total) by mouth once daily.     carvediloL 25 MG tablet  Commonly known as: COREG  Take 1 tablet (25 mg total) by mouth 2 (two) times a day.     empagliflozin 10 mg tablet  Commonly known as: JARDIANCE  Take 1 tablet (10 mg total) by mouth once daily.     gabapentin 300 MG capsule  Commonly known as: NEURONTIN  Take 1 capsule (300 mg total) by mouth 2 (two) times daily.     meclizine 25 mg tablet  Commonly known as: ANTIVERT  Take 1 tablet (25 mg total) by mouth 3 (three) times daily as needed for Dizziness.     tadalafiL 5 MG tablet  Commonly known as: CIALIS  Take 1 tablet (5 mg total) by mouth daily as needed for Erectile Dysfunction.     tamsulosin 0.4 mg Cap  Commonly known as: FLOMAX  Take 0.4 mg by mouth once daily.            STOP taking these medications      losartan 50 MG tablet  Commonly known as: COZAAR              Indwelling Lines/Drains at time of discharge:   Lines/Drains/Airways       None                   Time spent on the discharge of patient: 35 minutes of time spent on discharge, including examining the patient, providing discharge instructions, arranging follow up, and documentation.            Clarice Modi MD  Department of Hospital Medicine  [facility] - Cardiology Stepdown

## 2024-04-01 NOTE — CARE UPDATE
Please refer to consult note from 3/28.   No changes since last note.   Consent obtained for LHC and in chart          Jagjit Quiroz MD  Cardiology fellow

## 2024-04-01 NOTE — BRIEF OP NOTE
"    Post Cath Note  Referring Physician: Clarice Modi MD  Procedure: ANGIOGRAM, CORONARY ARTERY (N/A)      : Moe Marroquin MD     Referring Physician: Self,Aaareferral     All Operators: Surgeon(s):  Deven Médnez MD Jenkins, James S., MD Alers Sanchez, Lucianne, MD     Preoperative Diagnosis: Severe mitral regurgitation [I34.0]     Postop Diagnosis: Severe mitral regurgitation [I34.0]    Treatments/Procedures: Procedure(s) (LRB):  ANGIOGRAM, CORONARY ARTERY (N/A)    Estimated Blood loss: <50 cc         Access: Right radial    See full report for further details    Intervention:   Successful coronary angiogram. Has normal coronary arteries.     Closure device: Radial band    Post Cath Exam:   BP (!) 96/47 (BP Location: Left arm, Patient Position: Lying)   Pulse 66   Temp 96.2 °F (35.7 °C) (Tympanic)   Resp 18   Ht 5' 10" (1.778 m)   Wt 117.5 kg (259 lb 0.7 oz)   SpO2 98%   BMI 37.17 kg/m²   No unusual pain, hematoma, thrill or bruit at vascular access site.  Distal pulse present without signs of ischemia.    Recommendations:   - Routine post-cath care  - IVF at  100 ml/hr  for 4 hrs  - Continue with w/u for daniela Méndez    "

## 2024-04-01 NOTE — PLAN OF CARE
Pt discharged home with no post-acute needs. F/U appts scheduled by [facility].    Melisa [facility]  Case Management Department  [email or web address]    Bud Seay - Cardiology Stepdown  Discharge Final Note    Primary Care Provider: Tomas Hidalgo MD    Expected Discharge Date: 4/1/2024    Final Discharge Note (most recent)       Final Note - 04/01/24 1456          Final Note    Assessment Type Final Discharge Note     Anticipated Discharge Disposition Home or Self Care     Hospital Resources/Appts/Education Provided Provided patient/caregiver with written discharge plan information;Appointments scheduled and added to AVS        Post-Acute Status    Post-Acute Authorization Other     Other Status No Post-Acute Service Needs     Discharge Delays None known at this time                     Important Message from Medicare  Important Message from Medicare regarding Discharge Appeal Rights: Given to patient/caregiver, Explained to patient/caregiver, Signed/date by patient/caregiver     Date IMM was signed: 04/01/24  Time IMM was signed: 1104      Future Appointments   Date Time Provider Department Center   4/9/2024  9:30 AM LAB, APPOINTMENT [place] [facility]   4/9/2024 10:00 AM Melinda Galvan PA-C [facility]   4/9/2024 10:00 AM [facility] HEART FAILURE NURSE [facility]   4/15/2024  9:00 AM LAB, [place] [facility]   4/15/2024 10:00 AM Moe Willard MD [facility] HEARTTX [facility]   4/23/2024  8:00 AM Senthil Rodríguez MD [facility] CARDIO [facility]   5/14/2024  9:30 AM Moe Marroquin MD [facility] CARDVAL [facility]

## 2024-04-01 NOTE — CARE UPDATE
Franco Cooper is a 59 y.o. male referred by Dr. Calderon Galicia for evaluation of severe MR (NYHA Class III sx).    Mitral Valve Disease Etiology: Functional Mitral Regurgitation (Non-ischemic dilated cardiomyopathy)    The patient has undergone the following MitraClip work-up:   MINISTERIO (Date 3/20/2024): Severe mitral insufficiency, MVA 5 cm2, MG 1 mmHg, EOA 0.3 cm2, EF= 15%.   LHC (Date 4/1/2024): Normal coronary arteries   STS: 3%   Frailty: 1/4   Comorbidities: severe MR, HTN, AF, SHERYL   Labs: Hgb 12.1, Cr 1.2,

## 2024-04-03 NOTE — TELEPHONE ENCOUNTER
"[facility]) hospital discharge 48-72 hour phone follow up completed.     Most Recent Hospital Discharge Date:  4-1-2024  Last admission Diagnosis/chief complaint: SOB    [facility] RN Navigator spoke with PT    Current Patient reported weight: 262.8 lbs    Current Patient reported blood pressure and heart rate: Needs another Cuff     Pt reports the following:  []  Shortness of Breath with Activity  []  Shortness of Breath at rest   []  Fatigue  []  Edema   [] Chest pain or tightness  [] Weight Increase since discharge  [x] None of the above    Medications:   Discharge medication reviewed with pt.  Pt reports having medication list available and has all medications at home for use per list.  PT has questions re his new Rx for Spironolactone, they are answered.    Education:   Confirmed pt received "Home Care Guide for Heart Failure Patients" while admitted. YES  Reviewed key points as listed below.     Recommend 2 -3 gram sodium restriction and 1500 cc-2000 cc fluid restriction.  Encourage physical activity with graded exercise program.  Requested patient to weigh themselves daily, and to notify us if their weight increases by more than 3 lbs in 1 day or 5 lbs in 3 days.   Reminded patient to use "Daily weight and symptom tracker" to record and guide patient on when and how to call [facility]. PT may also use symptom tracker if no scale available  Pt reports being in the GREEN(color) Zone. If in yellow/red, reminded that they should be calling [facility] today or now.     Watch for these Signs and Symptoms: If any of these occur, contact [facility] immediately:   Increase in shortness of breath with movement   Increase in swelling in your legs and ankles   Weight gain of more than 3 pounds in a day or 5 pounds in 3 days.   Difficulty breathing when you are lying down   Worsening fatigue or tiredness   Stomach bloating, a full feeling or a loss of appetite   Increased coughing--especially when you are " lying down      Pt was able to verbalize back to RN in their own words correct diet/fluid restrictions, necessity for exercise, warning signs and symptoms, when and how to contact their [facility] team.      Pt educated on follow-up plan while in [facility] program to include:   Week 1 -  F/u appt with Provider and RN (Date) 4-9-24 @ 1000   Week 2-5 - In person/ Virtual/ phone call check ins    Week 5-7 - Pt will discharge from [facility] and transition to longterm care provider (Cardiology/PCP/ Advanced Heart Failure).      Patient active on myChart? Yes      Pt given the following contact information for ease of communication: [phone] (Mon-Fri, 8a-5p) & for urgent issues on the weekend to page the Heart Transplant MD on call.  Pt also encouraged utilize [facility] messaging as well.      Will follow up with pt at first clinic visit and RN navigator available for pt questions, issues or concerns.     Stressed the use of [facility] phone # and the after hours #.

## 2024-04-04 NOTE — TELEPHONE ENCOUNTER
Pt calls with C/O BP low,  states is 57 over something. BP on recheck with his cuff is 71/26 P 70's, I have PT lie down and tell him that I will message BRIE Galvan.     BRIE Recs as follows; Hold coreg, & entresto, don't take prn cialis and have him call us with BP tomorrow morning before meds.    Recs explained to PT who verbalizes understanding. PT takes AM meds @ 10:00 so I will be waiting for his call at that time.

## 2024-04-05 NOTE — TELEPHONE ENCOUNTER
"Call from PT as F/U from yesterday    PT has been OK, since last chat. He calls this AM with wt gain of 3 lbs since yesterday; Wed wt 262.;8 lbs, Thurs 260.8 and Today 263.8 however he reports a drop in U Output and the color is "dark almost reddish". He does PT for his back and just came back from that. /80 P 90. He sees you on Tuesday 9th. Any Recs?    Have PT Continue to monitor Wt/VS, hold PM dose of Entresto, and Coreg and no PRN Cialis when BP low.    PT reports only drinking 34 oz of Fluid, explained he can have 1.5 to 2 Quarts (Liters) so get at least his 2 Quarts in and call prn like he is doing.          "

## (undated) DEVICE — SPIKE SHORT LG BORE 1-WAY 2IN

## (undated) DEVICE — CATH DXTERITY JL40 100CM 6FR

## (undated) DEVICE — KIT GLIDESHEATH SLEND 6FR 10CM

## (undated) DEVICE — TRANSDUCER ADULT DISP

## (undated) DEVICE — OMNIPAQUE 350 200ML

## (undated) DEVICE — GUIDEWIRE EMERALD .035IN 260CM

## (undated) DEVICE — TRAY CATH LAB OMC

## (undated) DEVICE — HEMOSTAT VASC BAND LONG 27CM

## (undated) DEVICE — KIT CUSTOM MANIFOLD

## (undated) DEVICE — CATH JACKY RADIAL 3.5 110CM

## (undated) DEVICE — PAD DEFIB CADENCE ADULT R2

## (undated) DEVICE — DRAPE ANGIO BRACH 38X44IN